# Patient Record
Sex: FEMALE | Race: OTHER | HISPANIC OR LATINO
[De-identification: names, ages, dates, MRNs, and addresses within clinical notes are randomized per-mention and may not be internally consistent; named-entity substitution may affect disease eponyms.]

---

## 2018-08-15 ENCOUNTER — APPOINTMENT (OUTPATIENT)
Dept: OBGYN | Facility: CLINIC | Age: 33
End: 2018-08-15

## 2018-10-05 ENCOUNTER — EMERGENCY (EMERGENCY)
Facility: HOSPITAL | Age: 33
LOS: 1 days | Discharge: HOME | End: 2018-10-05
Attending: EMERGENCY MEDICINE
Payer: COMMERCIAL

## 2018-10-05 VITALS — WEIGHT: 145.06 LBS

## 2018-10-05 PROCEDURE — 99284 EMERGENCY DEPT VISIT MOD MDM: CPT | Mod: 25

## 2018-10-05 PROCEDURE — 99053 MED SERV 10PM-8AM 24 HR FAC: CPT

## 2018-10-06 VITALS
TEMPERATURE: 98 F | DIASTOLIC BLOOD PRESSURE: 76 MMHG | SYSTOLIC BLOOD PRESSURE: 124 MMHG | HEART RATE: 77 BPM | RESPIRATION RATE: 17 BRPM | OXYGEN SATURATION: 100 %

## 2018-10-06 LAB
ALBUMIN SERPL ELPH-MCNC: 4.6 G/DL — SIGNIFICANT CHANGE UP (ref 3.3–5.2)
ALP SERPL-CCNC: 77 U/L — SIGNIFICANT CHANGE UP (ref 40–120)
ALT FLD-CCNC: 45 U/L — HIGH
ANION GAP SERPL CALC-SCNC: 12 MMOL/L — SIGNIFICANT CHANGE UP (ref 5–17)
APPEARANCE UR: CLEAR — SIGNIFICANT CHANGE UP
APTT BLD: 34 SEC — SIGNIFICANT CHANGE UP (ref 27.5–37.4)
AST SERPL-CCNC: 26 U/L — SIGNIFICANT CHANGE UP
BASOPHILS # BLD AUTO: 0 K/UL — SIGNIFICANT CHANGE UP (ref 0–0.2)
BASOPHILS NFR BLD AUTO: 0.4 % — SIGNIFICANT CHANGE UP (ref 0–2)
BILIRUB SERPL-MCNC: <0.2 MG/DL — LOW (ref 0.4–2)
BILIRUB UR-MCNC: NEGATIVE — SIGNIFICANT CHANGE UP
BLD GP AB SCN SERPL QL: SIGNIFICANT CHANGE UP
BUN SERPL-MCNC: 15 MG/DL — SIGNIFICANT CHANGE UP (ref 8–20)
CALCIUM SERPL-MCNC: 9.4 MG/DL — SIGNIFICANT CHANGE UP (ref 8.6–10.2)
CHLORIDE SERPL-SCNC: 102 MMOL/L — SIGNIFICANT CHANGE UP (ref 98–107)
CO2 SERPL-SCNC: 25 MMOL/L — SIGNIFICANT CHANGE UP (ref 22–29)
COLOR SPEC: YELLOW — SIGNIFICANT CHANGE UP
CREAT SERPL-MCNC: 0.6 MG/DL — SIGNIFICANT CHANGE UP (ref 0.5–1.3)
DIFF PNL FLD: ABNORMAL
EOSINOPHIL # BLD AUTO: 0.2 K/UL — SIGNIFICANT CHANGE UP (ref 0–0.5)
EOSINOPHIL NFR BLD AUTO: 2.1 % — SIGNIFICANT CHANGE UP (ref 0–6)
EPI CELLS # UR: SIGNIFICANT CHANGE UP
GLUCOSE SERPL-MCNC: 112 MG/DL — SIGNIFICANT CHANGE UP (ref 70–115)
GLUCOSE UR QL: NEGATIVE MG/DL — SIGNIFICANT CHANGE UP
HCT VFR BLD CALC: 38 % — SIGNIFICANT CHANGE UP (ref 37–47)
HGB BLD-MCNC: 11.9 G/DL — LOW (ref 12–16)
INR BLD: 1.06 RATIO — SIGNIFICANT CHANGE UP (ref 0.88–1.16)
KETONES UR-MCNC: NEGATIVE — SIGNIFICANT CHANGE UP
LEUKOCYTE ESTERASE UR-ACNC: NEGATIVE — SIGNIFICANT CHANGE UP
LYMPHOCYTES # BLD AUTO: 2.3 K/UL — SIGNIFICANT CHANGE UP (ref 1–4.8)
LYMPHOCYTES # BLD AUTO: 24.4 % — SIGNIFICANT CHANGE UP (ref 20–55)
MCHC RBC-ENTMCNC: 28.7 PG — SIGNIFICANT CHANGE UP (ref 27–31)
MCHC RBC-ENTMCNC: 31.3 G/DL — LOW (ref 32–36)
MCV RBC AUTO: 91.8 FL — SIGNIFICANT CHANGE UP (ref 81–99)
MONOCYTES # BLD AUTO: 0.5 K/UL — SIGNIFICANT CHANGE UP (ref 0–0.8)
MONOCYTES NFR BLD AUTO: 5.1 % — SIGNIFICANT CHANGE UP (ref 3–10)
NEUTROPHILS # BLD AUTO: 6.3 K/UL — SIGNIFICANT CHANGE UP (ref 1.8–8)
NEUTROPHILS NFR BLD AUTO: 67.8 % — SIGNIFICANT CHANGE UP (ref 37–73)
NITRITE UR-MCNC: NEGATIVE — SIGNIFICANT CHANGE UP
PH UR: 7 — SIGNIFICANT CHANGE UP (ref 5–8)
PLATELET # BLD AUTO: 357 K/UL — SIGNIFICANT CHANGE UP (ref 150–400)
POTASSIUM SERPL-MCNC: 4.1 MMOL/L — SIGNIFICANT CHANGE UP (ref 3.5–5.3)
POTASSIUM SERPL-SCNC: 4.1 MMOL/L — SIGNIFICANT CHANGE UP (ref 3.5–5.3)
PROT SERPL-MCNC: 7.2 G/DL — SIGNIFICANT CHANGE UP (ref 6.6–8.7)
PROT UR-MCNC: NEGATIVE MG/DL — SIGNIFICANT CHANGE UP
PROTHROM AB SERPL-ACNC: 11.7 SEC — SIGNIFICANT CHANGE UP (ref 9.8–12.7)
RBC # BLD: 4.14 M/UL — LOW (ref 4.4–5.2)
RBC # FLD: 12.6 % — SIGNIFICANT CHANGE UP (ref 11–15.6)
RBC CASTS # UR COMP ASSIST: SIGNIFICANT CHANGE UP /HPF (ref 0–4)
SODIUM SERPL-SCNC: 139 MMOL/L — SIGNIFICANT CHANGE UP (ref 135–145)
SP GR SPEC: 1.01 — SIGNIFICANT CHANGE UP (ref 1.01–1.02)
TYPE + AB SCN PNL BLD: SIGNIFICANT CHANGE UP
UROBILINOGEN FLD QL: NEGATIVE MG/DL — SIGNIFICANT CHANGE UP
WBC # BLD: 9.3 K/UL — SIGNIFICANT CHANGE UP (ref 4.8–10.8)
WBC # FLD AUTO: 9.3 K/UL — SIGNIFICANT CHANGE UP (ref 4.8–10.8)
WBC UR QL: NEGATIVE — SIGNIFICANT CHANGE UP

## 2018-10-06 PROCEDURE — 86850 RBC ANTIBODY SCREEN: CPT

## 2018-10-06 PROCEDURE — 85730 THROMBOPLASTIN TIME PARTIAL: CPT

## 2018-10-06 PROCEDURE — 86900 BLOOD TYPING SEROLOGIC ABO: CPT

## 2018-10-06 PROCEDURE — 76830 TRANSVAGINAL US NON-OB: CPT

## 2018-10-06 PROCEDURE — 80053 COMPREHEN METABOLIC PANEL: CPT

## 2018-10-06 PROCEDURE — 85027 COMPLETE CBC AUTOMATED: CPT

## 2018-10-06 PROCEDURE — 76856 US EXAM PELVIC COMPLETE: CPT | Mod: 26

## 2018-10-06 PROCEDURE — 99284 EMERGENCY DEPT VISIT MOD MDM: CPT

## 2018-10-06 PROCEDURE — 36415 COLL VENOUS BLD VENIPUNCTURE: CPT

## 2018-10-06 PROCEDURE — 84702 CHORIONIC GONADOTROPIN TEST: CPT

## 2018-10-06 PROCEDURE — 81001 URINALYSIS AUTO W/SCOPE: CPT

## 2018-10-06 PROCEDURE — 76830 TRANSVAGINAL US NON-OB: CPT | Mod: 26

## 2018-10-06 PROCEDURE — 85610 PROTHROMBIN TIME: CPT

## 2018-10-06 PROCEDURE — 76856 US EXAM PELVIC COMPLETE: CPT

## 2018-10-06 PROCEDURE — 86901 BLOOD TYPING SEROLOGIC RH(D): CPT

## 2018-10-06 RX ORDER — KETOROLAC TROMETHAMINE 30 MG/ML
30 SYRINGE (ML) INJECTION ONCE
Qty: 0 | Refills: 0 | Status: COMPLETED | OUTPATIENT
Start: 2018-10-06 | End: 2018-10-06

## 2018-10-06 RX ORDER — SODIUM CHLORIDE 9 MG/ML
1000 INJECTION INTRAMUSCULAR; INTRAVENOUS; SUBCUTANEOUS ONCE
Qty: 0 | Refills: 0 | Status: DISCONTINUED | OUTPATIENT
Start: 2018-10-06 | End: 2018-10-10

## 2018-10-06 NOTE — ED PROVIDER NOTE - NS ED ROS FT
Const: Denies fever, chills  HEENT: Denies blurry vision, sore throat  Neck: Denies neck pain/stiffness  Resp: Denies coughing, SOB  Cardiovascular: Denies CP, palpitations, LE edema  GI: Denies vomiting, diarrhea, constipation, blood in stool. +NAUSEA, +ABDOMINAL PAIN, +RIGHT FLANK PAIN (resolved)  : Denies urinary frequency/urgency/dysuria, hematuria  MSK: Denies back pain  Neuro: Denies HA, dizziness, numbness, weakness  Skin: Denies rashes.

## 2018-10-06 NOTE — ED PROVIDER NOTE - PROGRESS NOTE DETAILS
Tatyana: sign out received. pt states she feels much better. She is tolerating PO and abd is soft and non-tender on re-examination. US with ruptured cyst, which is clinically consistent with her presentation. Pt has a gyn to f/u with, no discharge, stable for dc

## 2018-10-06 NOTE — ED PROVIDER NOTE - PHYSICAL EXAMINATION
Const: Awake, alert and oriented. In no acute distress. Well appearing.  HEENT: NC/AT. Moist mucous membranes.  Eyes: No scleral icterus. EOMI.  Neck:. Soft and supple. Full ROM without pain.  Cardiac: Regular rate and regular rhythm. +S1/S2. No murmurs. Peripheral pulses 2+ and symmetric. No LE edema.  Resp: Speaking in full sentences. No evidence of respiratory distress. No wheezes, rales or rhonchi.  Abd: Soft, diffuse suprapubic, LLQ, and RLQ tenderness on palpation, with guarding, non-distended. Normal bowel sounds in all 4 quadrants. No guarding or rebound.  Back: Spine midline and non-tender. No CVAT.  Skin: No rashes, abrasions or lacerations.  Neuro: Awake, alert & oriented x 3. Moves all extremities symmetrically.

## 2018-10-06 NOTE — ED ADULT NURSE REASSESSMENT NOTE - NS ED NURSE REASSESS COMMENT FT1
Patient rec;'d from previous shift  at bedside ; all diagnostics resulted, pt c/o of mild pressure to suprapubic area but as per patient "tolerable"

## 2018-10-06 NOTE — ED PROVIDER NOTE - MEDICAL DECISION MAKING DETAILS
Pt with sudden onset lower abd pain associated with nausea, will r/o ovarian torsion vs kidney stone.  Will consider CT abd/pelvis with IV and PO contrast if US negative to r/o appendicitis. Will repeat abd exam, give IV hydration and Toradol for pain control and reeval

## 2018-10-06 NOTE — ED PROVIDER NOTE - OBJECTIVE STATEMENT
33 y/o F, with hx of hematuria, presents to the ED c/o sudden onset lower abdominal pain, onset 20 minutes PTA.  Pt states pain is sharp, debilitating, and began while at rest.  Associated sx include nausea.  Pain radiates down bilateral lower extremities and to her lower back.  Pt states that she feels like she needs to have a bowel movement but is unable to.  Also notes that she required assistance to stand up off the toilet secondary to pain.  Pt states pain is now improving and is now a dull ache to her lower abdomen, but still radiates down her lower extremities and to her lower back.  Also notes that yesterday she had right flank soreness, which has since resolved.  Pt states that she is currently recovering from a URI.  Notes that last week she had a runny nose, fever, and sore throat last week, but is afebrile this week.  Pt with of hematuria of unknown cause, but does not notice blood in her urine today or the past few days.  States that she stopped her oral contraceptives in August and had 2 menstrual cycles in September.  Denies self medicating for pain.  Denies allergies to medications.  Denies similar sx in the past.  Denies fever, chills, chest pain, SOB, cough, vomiting, or HA.

## 2018-11-09 ENCOUNTER — APPOINTMENT (OUTPATIENT)
Dept: OBGYN | Facility: CLINIC | Age: 33
End: 2018-11-09

## 2019-01-20 ENCOUNTER — TRANSCRIPTION ENCOUNTER (OUTPATIENT)
Age: 34
End: 2019-01-20

## 2019-01-23 ENCOUNTER — APPOINTMENT (OUTPATIENT)
Dept: ORTHOPEDIC SURGERY | Facility: CLINIC | Age: 34
End: 2019-01-23
Payer: COMMERCIAL

## 2019-01-23 ENCOUNTER — FORM ENCOUNTER (OUTPATIENT)
Age: 34
End: 2019-01-23

## 2019-01-23 ENCOUNTER — TRANSCRIPTION ENCOUNTER (OUTPATIENT)
Age: 34
End: 2019-01-23

## 2019-01-23 PROCEDURE — 99204 OFFICE O/P NEW MOD 45 MIN: CPT

## 2019-01-23 PROCEDURE — 73610 X-RAY EXAM OF ANKLE: CPT | Mod: LT

## 2019-01-23 NOTE — HISTORY OF PRESENT ILLNESS
[FreeTextEntry1] : 33 year year old female presenting with L ankle pain. The patient’s pain is noted to be a 7/10. She states that her pain has been occuring since 1/9//19. The patient's pain is described as constant and localized in nature. The patient denies any falls, trauma, or injuries. \par She is currently taking no pain medication. She presents today wearing regular shoes. No other complaints at this time. \par

## 2019-01-23 NOTE — DISCUSSION/SUMMARY
[de-identified] : Today I had a lengthy discussion with the patient regarding their L ankle pain.I have addressed all the patient's concerns surrounding the pathology of their condition. At this time, I would like to obtain advanced imaging of the patient's L ankle. An MRI was ordered of the L ankle in order to better understand the etiology of the patient’s condition. She should follow up with the office after completing the MRI. In the interim, the patient can utilize ice, NSAIDs PRN, heat, and elevate their L ankle above the level of their heart. The patient understood and verbally agreed to the treatment plan. All of their questions were answered and they were satisfied with the visit. The patient should call the office if they have any questions or experience worsening symptoms. \par

## 2019-01-23 NOTE — ADDENDUM
[FreeTextEntry1] : I, Walter Duncan, acted solely as a scribe for Dr. Jeff James on this date 01/23/2019  .\par  \par All medical record entries made by the Scribe were at my, Dr. Jeff James, direction and personally dictated by me on 01/23/2019 . I have reviewed the chart and agree that the record accurately reflects my personal performance of the history, physical exam, assessment and plan. I have also personally directed, reviewed, and agreed with the chart.\par \par \par

## 2019-01-23 NOTE — PHYSICAL EXAM
[de-identified] : General: Alert and oriented x3. In no acute distress. Pleasant in nature with a normal affect. No apparent respiratory distress.\par \par L Ankle Exam \par \par Skin: Clean, dry, intact\par Inspection: No obvious malalignment, no masses, no swelling, no effusion\par Pulses: 2+ DP/PT pulses\par ROM: FOOT Full ROM of digits, ANKLE 10 degrees of dorsiflexion, 40 degrees of plantarflexion, 10 degrees of subtalar motion.\par Painful ROM: None\par Tenderness: +pain to peroneal with circumductio. +Distal peroneal tendon adjacent to cuboid pain. No tenderness over the medial malleolus, no tenderness over the lateral malleolus, no CFL/ATFL/PTFL pain. No deltoid ligament pain. No heel pain. No Achilles tenderness. No 5th metatarsal pain. No pain to the LisFranc joint. No ttp over the posterior tibial tendon.\par Stability: Negative anterior/posterior drawer.\par Strength: 5/5 ADD/ABD/TA/GS/EHL/FHL/EDL\par Neuro: Sensation in tact to light touch throughout\par Additional tests: Negative Mortons test, negative tarsal tunnel tinels, negative single heel rise\par \par \par   [de-identified] : 3V of the L ankle were ordered obtained and reviewed by me today, 01/23/2019 , revealed: no fx\par \par \par \par

## 2019-01-23 NOTE — CONSULT LETTER
[Consult Letter:] : I had the pleasure of evaluating your patient, [unfilled]. [Please see my note below.] : Please see my note below. [Consult Closing:] : Thank you very much for allowing me to participate in the care of this patient.  If you have any questions, please do not hesitate to contact me. [Sincerely,] : Sincerely, [FreeTextEntry3] : Jeff James

## 2019-01-23 NOTE — DISCUSSION/SUMMARY
[de-identified] : Today I had a lengthy discussion with the patient regarding their L ankle pain.I have addressed all the patient's concerns surrounding the pathology of their condition. At this time, I would like to obtain advanced imaging of the patient's L ankle. An MRI was ordered of the L ankle in order to better understand the etiology of the patient’s condition. She should follow up with the office after completing the MRI. In the interim, the patient can utilize ice, NSAIDs PRN, heat, and elevate their L ankle above the level of their heart. The patient understood and verbally agreed to the treatment plan. All of their questions were answered and they were satisfied with the visit. The patient should call the office if they have any questions or experience worsening symptoms. \par

## 2019-01-23 NOTE — PHYSICAL EXAM
[de-identified] : General: Alert and oriented x3. In no acute distress. Pleasant in nature with a normal affect. No apparent respiratory distress.\par \par L Ankle Exam \par \par Skin: Clean, dry, intact\par Inspection: No obvious malalignment, no masses, no swelling, no effusion\par Pulses: 2+ DP/PT pulses\par ROM: FOOT Full ROM of digits, ANKLE 10 degrees of dorsiflexion, 40 degrees of plantarflexion, 10 degrees of subtalar motion.\par Painful ROM: None\par Tenderness: +pain to peroneal with circumductio. +Distal peroneal tendon adjacent to cuboid pain. No tenderness over the medial malleolus, no tenderness over the lateral malleolus, no CFL/ATFL/PTFL pain. No deltoid ligament pain. No heel pain. No Achilles tenderness. No 5th metatarsal pain. No pain to the LisFranc joint. No ttp over the posterior tibial tendon.\par Stability: Negative anterior/posterior drawer.\par Strength: 5/5 ADD/ABD/TA/GS/EHL/FHL/EDL\par Neuro: Sensation in tact to light touch throughout\par Additional tests: Negative Mortons test, negative tarsal tunnel tinels, negative single heel rise\par \par \par   [de-identified] : 3V of the L ankle were ordered obtained and reviewed by me today, 01/23/2019 , revealed: no fx\par \par \par \par

## 2019-01-24 ENCOUNTER — OUTPATIENT (OUTPATIENT)
Dept: OUTPATIENT SERVICES | Facility: HOSPITAL | Age: 34
LOS: 1 days | End: 2019-01-24
Payer: COMMERCIAL

## 2019-01-24 ENCOUNTER — APPOINTMENT (OUTPATIENT)
Dept: MRI IMAGING | Facility: CLINIC | Age: 34
End: 2019-01-24
Payer: COMMERCIAL

## 2019-01-24 DIAGNOSIS — M25.572 PAIN IN LEFT ANKLE AND JOINTS OF LEFT FOOT: ICD-10-CM

## 2019-01-24 DIAGNOSIS — M76.72 PERONEAL TENDINITIS, LEFT LEG: ICD-10-CM

## 2019-01-24 PROCEDURE — 73721 MRI JNT OF LWR EXTRE W/O DYE: CPT | Mod: 26,LT

## 2019-01-24 PROCEDURE — 73721 MRI JNT OF LWR EXTRE W/O DYE: CPT

## 2019-01-25 ENCOUNTER — APPOINTMENT (OUTPATIENT)
Dept: ORTHOPEDIC SURGERY | Facility: CLINIC | Age: 34
End: 2019-01-25
Payer: COMMERCIAL

## 2019-01-25 PROCEDURE — 99214 OFFICE O/P EST MOD 30 MIN: CPT

## 2019-01-25 NOTE — DISCUSSION/SUMMARY
[de-identified] : Today I had a lengthy discussion with the patient regarding their L ankle pain.I have addressed all the patient's concerns surrounding the pathology of their condition. At this time, I recommend conservative treatment for the patient's left ankle pain. I recommend the patient undergo a course of physical therapy for the L ankle  2-3 times a week for a total of 6-8 weeks. A prescription was given for the physical therapy today. I also recommend that the patient utilize Voltaren gel. A prescription for the Voltaren gel was ordered for the patient to be used as instructed. If the Voltaren gel cannot be obtained, icy hot, Biofreeze, or Bengay can be utilized instead. In addition, I suggest that the patient utilize Meloxicam as instructed. A prescription for the Meloxicam was ordered for the patient in the office today. I would like to see the pt back in the office in 6-8 weeks to reassess their condition.\par The patient understood and verbally agreed to the treatment plan. All of their questions were answered and they were satisfied with the visit. The patient should call the office if they have any questions or experience worsening symptoms. \par

## 2019-01-25 NOTE — HISTORY OF PRESENT ILLNESS
[FreeTextEntry1] : 33 year year old female presenting with L ankle pain. The patient’s pain is noted to be a 6/10. The patient describes their pain as the same compared to their last visit. She states that she has continued pain in the left ankle. She is currently taking NSAIDs. The patient presents today wearing regular shoes. No other complaints at this time. \par

## 2019-01-25 NOTE — PHYSICAL EXAM
[de-identified] : General: Alert and oriented x3. In no acute distress. Pleasant in nature with a normal affect. No apparent respiratory distress.\par \par L Ankle Exam \par \par Skin: Clean, dry, intact\par Inspection: No obvious malalignment, no masses, no swelling, no effusion\par Pulses: 2+ DP/PT pulses\par ROM: FOOT Full ROM of digits, ANKLE 10 degrees of dorsiflexion, 40 degrees of plantarflexion, 10 degrees of subtalar motion.\par Painful ROM: None\par Tenderness: +Pain to peroneal with circumduction. +Distal peroneal tendon adjacent to the cuboid pain. No tenderness over the medial malleolus, no tenderness over the lateral malleolus, no CFL/ATFL/PTFL pain. No deltoid ligament pain. No heel pain. No Achilles tenderness. No 5th metatarsal pain. No pain to the LisFranc joint. No ttp over the posterior tibial tendon.\par Stability: Negative anterior/posterior drawer.\par Strength: 5/5 ADD/ABD/TA/GS/EHL/FHL/EDL\par Neuro: Sensation in tact to light touch throughout\par Additional tests: Negative Mortons test, negative tarsal tunnel tinels, negative single heel rise\par \par \par   [de-identified] : MRI from [facility] ankle on 1/25/19 obtained and reviewed by me today, 01/25/2019 , revealed:\par \par 1.  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 There is \par moderate to severe intrasubstance, moderate tendinosis, and peritendinous \par edema centered about an os peroneum within the peroneus longus tendon at the \par level of the cuboid. Proximal to the region of intratendinous edema of the \par peroneus longus tendon appears protuberant suggestive of some degree of \par intrasubstance tearing distal to this region. No full-thickness tear is \par demonstrated. There is mild intrasubstance edema within the os peroneum. \par There is mild peroneal tenosynovitis.

## 2019-01-25 NOTE — ADDENDUM
[FreeTextEntry1] : I, Walter Duncan, acted solely as a scribe for Dr. Jeff James on this date 01/25/2019  .\par  \par All medical record entries made by the Scribe were at my, Dr. Jeff James, direction and personally dictated by me on 01/25/2019 . I have reviewed the chart and agree that the record accurately reflects my personal performance of the history, physical exam, assessment and plan. I have also personally directed, reviewed, and agreed with the chart.\par \par \par

## 2019-01-26 ENCOUNTER — APPOINTMENT (OUTPATIENT)
Dept: OBGYN | Facility: CLINIC | Age: 34
End: 2019-01-26
Payer: COMMERCIAL

## 2019-01-26 VITALS
SYSTOLIC BLOOD PRESSURE: 120 MMHG | HEIGHT: 61 IN | WEIGHT: 150 LBS | DIASTOLIC BLOOD PRESSURE: 75 MMHG | BODY MASS INDEX: 28.32 KG/M2

## 2019-01-26 DIAGNOSIS — Z80.41 FAMILY HISTORY OF MALIGNANT NEOPLASM OF OVARY: ICD-10-CM

## 2019-01-26 DIAGNOSIS — Z80.49 FAMILY HISTORY OF MALIGNANT NEOPLASM OF OTHER GENITAL ORGANS: ICD-10-CM

## 2019-01-26 PROCEDURE — 99385 PREV VISIT NEW AGE 18-39: CPT

## 2019-01-26 RX ORDER — NORGESTIMATE AND ETHINYL ESTRADIOL 7DAYSX3 LO
0.18/0.215/0.25 KIT ORAL
Qty: 28 | Refills: 0 | Status: COMPLETED | COMMUNITY
Start: 2018-02-21 | End: 2019-01-26

## 2019-01-26 NOTE — PHYSICAL EXAM
[Awake] : awake [Alert] : alert [Soft] : soft [Oriented x3] : oriented to person, place, and time [Labia Majora] : labia major [Labia Minora] : labia minora [Normal] : clitoris [No Bleeding] : there was no active vaginal bleeding [Pap Obtained] : a Pap smear was performed [Uterine Adnexae] : were not tender and not enlarged [Acute Distress] : no acute distress [LAD] : no lymphadenopathy [Thyroid Nodule] : no thyroid nodule [Goiter] : no goiter [Mass] : no breast mass [Nipple Discharge] : no nipple discharge [Axillary LAD] : no axillary lymphadenopathy [Tender] : non tender [Distended] : not distended [H/Smegaly] : no hepatosplenomegaly [Depressed Mood] : not depressed [Flat Affect] : affect not flat

## 2019-01-26 NOTE — HISTORY OF PRESENT ILLNESS
[___ Year(s) Ago] : [unfilled] year(s) ago [Good] : being in good health [Healthy Diet] : a healthy diet [Weight Concerns] : weight concens [Last Pap ___] : Last cervical pap smear was [unfilled] [Reproductive Age] : is of reproductive age [No] : no [Regular Exercise] : not exercising regularly [Contraception] : does not use contraception

## 2019-01-28 LAB — HPV HIGH+LOW RISK DNA PNL CVX: NOT DETECTED

## 2019-01-29 ENCOUNTER — TRANSCRIPTION ENCOUNTER (OUTPATIENT)
Age: 34
End: 2019-01-29

## 2019-01-30 ENCOUNTER — APPOINTMENT (OUTPATIENT)
Dept: ORTHOPEDIC SURGERY | Facility: CLINIC | Age: 34
End: 2019-01-30
Payer: COMMERCIAL

## 2019-01-30 PROCEDURE — 99213 OFFICE O/P EST LOW 20 MIN: CPT

## 2019-01-30 NOTE — HISTORY OF PRESENT ILLNESS
[FreeTextEntry1] : Gloria is returned patient presents with continued left foot pain/ankle. She was diagnosed with Os Peroneum Syndrome confirmed by MRI. She is here today to receive a short CAM boot to offload the foot and ankle and to give her some relief because she is living with 5/10 pain as we speak. She was given a physical therapy prescription 5 days ago when she was in office. She was also given anti-inflammatory cream and she tried meloxicam but she broke out in a rash and cannot take it although she did state that it did help her. She has no other complaints at this time.

## 2019-01-30 NOTE — PHYSICAL EXAM
[de-identified] : The physical exam of the left ankle and foot did not change since the previous visit 5 days ago. She continues to have lateral ankle pain over the peroneal tendons. [de-identified] : No new imaging performed or reviewed today and office.

## 2019-01-30 NOTE — DISCUSSION/SUMMARY
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 \par \par Plan:\par I placed her into a short cam boot today. She will continue her physical therapy. I prescribed her Celebrex because she cannot take meloxicam. She will also continue to use the anti-inflammatory gels she has at home. I want her to follow up in 4 weeks to make sure she is going into the right direction. At that office visit we will discuss transitioning from a short CAM boot to an ankle ASO if she is feeling better. She is on board with this treatment plan and all her questions were answered.

## 2019-01-31 LAB — CYTOLOGY CVX/VAG DOC THIN PREP: NORMAL

## 2019-02-27 ENCOUNTER — APPOINTMENT (OUTPATIENT)
Dept: ORTHOPEDIC SURGERY | Facility: CLINIC | Age: 34
End: 2019-02-27
Payer: COMMERCIAL

## 2019-02-27 PROCEDURE — 99213 OFFICE O/P EST LOW 20 MIN: CPT

## 2019-02-27 NOTE — ADDENDUM
[FreeTextEntry1] : Documented by Sumaya Gutierrez acting as a scribe for Dr. James on 02/27/2019 \par \par All medical record entries made by the Scribe were at my, Dr. Vital, direction and\par personally dictated by me on 02/27/2019 . I have reviewed the chart and agree that the record\par accurately reflects my personal performance of the history, physical exam, procedure and imaging.

## 2019-02-27 NOTE — HISTORY OF PRESENT ILLNESS
[FreeTextEntry1] : Pt is a 33 year old  F present in the office today in regards to her L ankle pain. Pt notes being in the CAM boot helped her tremendously. She also notes she has been using V-gel.  Her current pain level is a 2/10. Pt notes Her  pain has improved 90% and the swelling has improved 100% as compared to their last visit. She is not currently taking any pain medications at this moment. Pt is accompanied by her . No other complaints at this time.\par

## 2019-02-27 NOTE — PHYSICAL EXAM
[de-identified] : General: Alert and oriented x3. In no acute distress. Pleasant in nature with a normal affect. No apparent respiratory distress.\par \par L Ankle Exam\par Skin: Clean, dry and intact.\par Inspection: No obvious malalignment, no swelling, no effusion;no lymphadenopathy\par Pulses: 2+ DP/PT pulses\par ROM: Right: 10  degrees dorsiflexion, 40   degrees of plantarflexion,  10  degrees of subtalar motion. Left: 10 degrees dorsiflexion, 40   degrees of plantarflexion,  10  degrees of subtalar motion.\par Tenderness: No tenderness over the lateral malleolus, no CFL/ATFL/PTFL pain. No medial malleolus pain, no deltoid ligament pain. No proximal fibular pain. No heel pain.\par Stability: Negative anterior/posterior drawer. \par Strength: 5/5 TA/GS/EHL\par Neuro: Intact to light touch throughout\par Additional tests: Negative Mazariegos's test, negative syndesmosis squeeze test.\par . [de-identified] : None new obtained today.\par

## 2019-02-27 NOTE — DISCUSSION/SUMMARY
[de-identified] : Today in the office I had a lengthy discussion with the patient regarding her L ankle pain. I have addressed all of the patient's concern surrounding the pathology of their conditions. I have offered her physical therapy but she has deferred. She will do home exercises on her own and work on her ROM for the interim. The pt is to call me as soon as possible if they notice any worsening pain or symptoms. I would like to follow up with the patient within the next 2-3 months prn. All questions were answered and the patient verbalized understanding. The patient is in agreement with this treatment plan.\par

## 2019-03-01 ENCOUNTER — TRANSCRIPTION ENCOUNTER (OUTPATIENT)
Age: 34
End: 2019-03-01

## 2019-03-07 ENCOUNTER — APPOINTMENT (OUTPATIENT)
Dept: ANTEPARTUM | Facility: CLINIC | Age: 34
End: 2019-03-07

## 2019-03-07 ENCOUNTER — APPOINTMENT (OUTPATIENT)
Dept: OBGYN | Facility: CLINIC | Age: 34
End: 2019-03-07

## 2019-03-08 ENCOUNTER — APPOINTMENT (OUTPATIENT)
Dept: OBGYN | Facility: CLINIC | Age: 34
End: 2019-03-08
Payer: COMMERCIAL

## 2019-03-08 PROCEDURE — 99212 OFFICE O/P EST SF 10 MIN: CPT

## 2019-03-09 ENCOUNTER — RESULT REVIEW (OUTPATIENT)
Age: 34
End: 2019-03-09

## 2019-03-09 LAB
CANDIDA VAG CYTO: NOT DETECTED
G VAGINALIS+PREV SP MTYP VAG QL MICRO: DETECTED
T VAGINALIS VAG QL WET PREP: NOT DETECTED

## 2019-03-11 ENCOUNTER — APPOINTMENT (OUTPATIENT)
Dept: ORTHOPEDIC SURGERY | Facility: CLINIC | Age: 34
End: 2019-03-11

## 2019-04-01 ENCOUNTER — APPOINTMENT (OUTPATIENT)
Dept: OBGYN | Facility: CLINIC | Age: 34
End: 2019-04-01
Payer: COMMERCIAL

## 2019-04-01 ENCOUNTER — NON-APPOINTMENT (OUTPATIENT)
Age: 34
End: 2019-04-01

## 2019-04-01 ENCOUNTER — ASOB RESULT (OUTPATIENT)
Age: 34
End: 2019-04-01

## 2019-04-01 VITALS
HEIGHT: 61 IN | WEIGHT: 145 LBS | BODY MASS INDEX: 27.38 KG/M2 | SYSTOLIC BLOOD PRESSURE: 118 MMHG | DIASTOLIC BLOOD PRESSURE: 70 MMHG

## 2019-04-01 PROCEDURE — 0501F PRENATAL FLOW SHEET: CPT

## 2019-04-01 PROCEDURE — 36415 COLL VENOUS BLD VENIPUNCTURE: CPT

## 2019-04-01 PROCEDURE — 76817 TRANSVAGINAL US OBSTETRIC: CPT

## 2019-04-01 PROCEDURE — 0500F INITIAL PRENATAL CARE VISIT: CPT

## 2019-04-01 RX ORDER — DICLOFENAC SODIUM 10 MG/G
1 GEL TOPICAL
Qty: 1 | Refills: 2 | Status: DISCONTINUED | COMMUNITY
Start: 2019-01-25 | End: 2019-04-01

## 2019-04-01 RX ORDER — METRONIDAZOLE 7.5 MG/G
0.75 GEL VAGINAL
Qty: 1 | Refills: 0 | Status: DISCONTINUED | COMMUNITY
Start: 2019-03-09 | End: 2019-04-01

## 2019-04-01 RX ORDER — CELECOXIB 100 MG/1
100 CAPSULE ORAL TWICE DAILY
Qty: 30 | Refills: 0 | Status: DISCONTINUED | COMMUNITY
Start: 2019-01-30 | End: 2019-04-01

## 2019-04-01 RX ORDER — MELOXICAM 7.5 MG/1
7.5 TABLET ORAL DAILY
Qty: 30 | Refills: 1 | Status: DISCONTINUED | COMMUNITY
Start: 2019-01-25 | End: 2019-04-01

## 2019-04-04 ENCOUNTER — RX CHANGE (OUTPATIENT)
Age: 34
End: 2019-04-04

## 2019-04-05 ENCOUNTER — RESULT REVIEW (OUTPATIENT)
Age: 34
End: 2019-04-05

## 2019-04-05 ENCOUNTER — NON-APPOINTMENT (OUTPATIENT)
Age: 34
End: 2019-04-05

## 2019-04-05 LAB — ABO + RH PNL BLD: NORMAL

## 2019-04-11 LAB
AR GENE MUT ANL BLD/T: NEGATIVE
B19V IGG SER QL IA: 5 INDEX
B19V IGG+IGM SER-IMP: NORMAL
B19V IGG+IGM SER-IMP: POSITIVE
B19V IGM FLD-ACNC: 0.2
B19V IGM SER-ACNC: NEGATIVE
BASOPHILS # BLD AUTO: 0.05 K/UL
BASOPHILS NFR BLD AUTO: 0.4 %
BLD GP AB SCN SERPL QL: NORMAL
C TRACH RRNA SPEC QL NAA+PROBE: NOT DETECTED
CFTR MUT TESTED BLD/T: NEGATIVE
CMV IGG SERPL QL: <0.2 U/ML
CMV IGG SERPL-IMP: NEGATIVE
CMV IGM SERPL QL: <8 AU/ML
CMV IGM SERPL QL: NEGATIVE
EOSINOPHIL # BLD AUTO: 0.19 K/UL
EOSINOPHIL NFR BLD AUTO: 1.5 %
ESTIMATED AVERAGE GLUCOSE: 117 MG/DL
FMR1 GENE MUT ANL BLD/T: NORMAL
HBA1C MFR BLD HPLC: 5.7 %
HBV SURFACE AG SER QL: NONREACTIVE
HCT VFR BLD CALC: 35.1 %
HGB A MFR BLD: 97.4 %
HGB A2 MFR BLD: 2.6 %
HGB BLD-MCNC: 11.4 G/DL
HGB FRACT BLD-IMP: NORMAL
HIV1+2 AB SPEC QL IA.RAPID: NONREACTIVE
IMM GRANULOCYTES NFR BLD AUTO: 0.5 %
LYMPHOCYTES # BLD AUTO: 3.28 K/UL
LYMPHOCYTES NFR BLD AUTO: 25.6 %
MAN DIFF?: NORMAL
MCHC RBC-ENTMCNC: 30.3 PG
MCHC RBC-ENTMCNC: 32.5 GM/DL
MCV RBC AUTO: 93.4 FL
MONOCYTES # BLD AUTO: 0.61 K/UL
MONOCYTES NFR BLD AUTO: 4.8 %
N GONORRHOEA RRNA SPEC QL NAA+PROBE: NOT DETECTED
NEUTROPHILS # BLD AUTO: 8.62 K/UL
NEUTROPHILS NFR BLD AUTO: 67.2 %
PLATELET # BLD AUTO: 358 K/UL
RBC # BLD: 3.76 M/UL
RBC # FLD: 12.7 %
RUBV IGG FLD-ACNC: 3.3 INDEX
RUBV IGG SER-IMP: POSITIVE
SOURCE AMPLIFICATION: NORMAL
T GONDII AB SER-IMP: NEGATIVE
T GONDII IGM SER QL: <3 AU/ML
T PALLIDUM AB SER QL IA: NEGATIVE
TSH SERPL-ACNC: 1.12 UIU/ML
WBC # FLD AUTO: 12.81 K/UL

## 2019-04-22 LAB
GLUCOSE 2H P 100 G GLC PO SERPL-MCNC: 147 MG/DL
GLUCOSE BS SERPL-MCNC: 79 MG/DL

## 2019-04-23 ENCOUNTER — APPOINTMENT (OUTPATIENT)
Dept: OBGYN | Facility: CLINIC | Age: 34
End: 2019-04-23
Payer: COMMERCIAL

## 2019-04-23 ENCOUNTER — ASOB RESULT (OUTPATIENT)
Age: 34
End: 2019-04-23

## 2019-04-23 ENCOUNTER — NON-APPOINTMENT (OUTPATIENT)
Age: 34
End: 2019-04-23

## 2019-04-23 VITALS
WEIGHT: 147 LBS | BODY MASS INDEX: 27.75 KG/M2 | SYSTOLIC BLOOD PRESSURE: 110 MMHG | HEIGHT: 61 IN | DIASTOLIC BLOOD PRESSURE: 70 MMHG

## 2019-04-23 LAB
BILIRUB UR QL STRIP: NORMAL
CLARITY UR: CLEAR
COLLECTION METHOD: NORMAL
GLUCOSE UR-MCNC: NORMAL
HCG UR QL: 0.2 EU/DL
HGB UR QL STRIP.AUTO: NORMAL
KETONES UR-MCNC: NORMAL
LEUKOCYTE ESTERASE UR QL STRIP: NORMAL
NITRITE UR QL STRIP: NORMAL
PH UR STRIP: 6
PROT UR STRIP-MCNC: NORMAL
SP GR UR STRIP: 1.02

## 2019-04-23 PROCEDURE — 0502F SUBSEQUENT PRENATAL CARE: CPT

## 2019-04-23 PROCEDURE — 36415 COLL VENOUS BLD VENIPUNCTURE: CPT

## 2019-04-23 PROCEDURE — 76813 OB US NUCHAL MEAS 1 GEST: CPT

## 2019-04-26 ENCOUNTER — NON-APPOINTMENT (OUTPATIENT)
Age: 34
End: 2019-04-26

## 2019-04-26 LAB
1ST TRIMESTER DATA: NORMAL
ADDENDUM DOC: NORMAL
AFP PNL SERPL: NORMAL
AFP SERPL-ACNC: NORMAL
CLINICAL BIOCHEMIST REVIEW: NORMAL
FREE BETA HCG 1ST TRIMESTER: NORMAL
Lab: NORMAL
NASAL BONE: PRESENT
NOTES NTD: NORMAL
NT: NORMAL
PAPP-A SERPL-ACNC: NORMAL
TRISOMY 18/3: NORMAL

## 2019-05-08 ENCOUNTER — APPOINTMENT (OUTPATIENT)
Dept: MATERNAL FETAL MEDICINE | Facility: CLINIC | Age: 34
End: 2019-05-08

## 2019-05-09 ENCOUNTER — APPOINTMENT (OUTPATIENT)
Dept: MATERNAL FETAL MEDICINE | Facility: CLINIC | Age: 34
End: 2019-05-09
Payer: COMMERCIAL

## 2019-05-09 ENCOUNTER — APPOINTMENT (OUTPATIENT)
Dept: ANTEPARTUM | Facility: CLINIC | Age: 34
End: 2019-05-09

## 2019-05-09 VITALS
HEART RATE: 74 BPM | BODY MASS INDEX: 28.02 KG/M2 | SYSTOLIC BLOOD PRESSURE: 118 MMHG | OXYGEN SATURATION: 98 % | DIASTOLIC BLOOD PRESSURE: 72 MMHG | RESPIRATION RATE: 18 BRPM | HEIGHT: 61 IN | WEIGHT: 148.44 LBS

## 2019-05-09 DIAGNOSIS — M25.572 PAIN IN LEFT ANKLE AND JOINTS OF LEFT FOOT: ICD-10-CM

## 2019-05-09 DIAGNOSIS — Z3A.09 9 WEEKS GESTATION OF PREGNANCY: ICD-10-CM

## 2019-05-09 DIAGNOSIS — Q68.8 OTHER SPECIFIED CONGENITAL MUSCULOSKELETAL DEFORMITIES: ICD-10-CM

## 2019-05-09 DIAGNOSIS — M76.72 PERONEAL TENDINITIS, LEFT LEG: ICD-10-CM

## 2019-05-09 DIAGNOSIS — Z3A.27 27 WEEKS GESTATION OF PREGNANCY: ICD-10-CM

## 2019-05-09 DIAGNOSIS — M54.5 LOW BACK PAIN: ICD-10-CM

## 2019-05-09 DIAGNOSIS — Z87.42 PERSONAL HISTORY OF OTHER DISEASES OF THE FEMALE GENITAL TRACT: ICD-10-CM

## 2019-05-09 DIAGNOSIS — N91.1 SECONDARY AMENORRHEA: ICD-10-CM

## 2019-05-09 PROCEDURE — 99242 OFF/OP CONSLTJ NEW/EST SF 20: CPT

## 2019-05-09 NOTE — SURGICAL HISTORY
[Fibroids] : fibroids [Abn Paps] : abnormal pap smear [STI's] : no STI's [Breast Disease] : no breast disease [Cysts] : no cysts [OC Use] : no OC use [Infertility] : no infertility [Last Mammo: ___] : Last Mammo: none [de-identified] : Left lateral fibroid.  Hx of abd pap in 2009 s/p colposcopy and LEEP in 2010.  Hx of HPV, most recent PAP wnl.

## 2019-05-09 NOTE — ACTIVE PROBLEMS
[Diabetes Mellitus] : no diabetes mellitus [Autoimmune Disease] : no autoimmune disease [Hypertension] : no hypertension [Heart Disease] : no heart disease [Depression] : no depression, no post partum depression [Renal Disease] : no kidney disease, no UTI [Neurologic Disorder] : no neurologic disorder, no epilepsy [Psychiatric Disorders] : no psychiatric disorders [Hepatic Disorder] : no hepatitis, no liver disease [Thrombophlebitis] : no varicosities, no phlebitis [Thyroid Disorder] : no thyroid dysfunction [Blood Transfusion (___ Ml)] : no history of blood transfusion [Trauma] : no trauma/violence

## 2019-05-09 NOTE — FAMILY HISTORY
[Age 35+ During Pregnancy] : not 35 or over during pregnancy [Reported Family History Of Birth Defects] : no congenital heart defects [Herb-Sachs Carrier] : no Herb-Sachs [Family History] : no mental retardation/autism [Reported Family History Of Genetic Disease] : no genetic/chromosomal disorder [FreeTextEntry1] : Pt states Husbands uncle has down syndrome

## 2019-05-09 NOTE — PAST MEDICAL HISTORY
[HIV Infection] : no HIV [Syphilis] : no syphilis [Exposure To Gonorrhea] : no gonorrhea [Chlamydial Infections] : no chlamydia [Hepatitis, B Virus] : no Hepatitis B [Herpes Simplex] : no genital herpes [Hepatitis, C Virus] : no Hepatitis C [Human Papilloma Virus Infection] : no genital warts [Trichomoniasis] : no trichomoniasis

## 2019-05-09 NOTE — DISCUSSION/SUMMARY
[FreeTextEntry1] : She is 14 weeks and 3 days gestation by her last menstrual period dates.\par \par I discussed the significance of a low FILI-A level with her and the father of the baby. I told them that FILI-A levels equal or less than the 5th percentile have been associated with a 2.7 fold increased risk for having a low birth weight infant. I also told them that there is a 2.4 fold increased risk for having a delivery before 34 weeks gestation, a 3.7 fold increased risk for having preeclampsia during pregnancy, a 3.3 fold increased risk for having a miscarriage or fetal loss before 24 weeks of gestation and a 1.9 fold increased risk for having a stillbirth or fetal loss at or after 24 weeks of gestation. I recommend a transvaginal ultrasound examination of the cervix at the time of the fetal anatomy ultrasound examination (between 18 - 20 weeks gestation) to screen for risk of  labor/delivery. I gave her  labor precautions. I advised her to take  Folic Acid 1 mg  daily and I sent an electronic prescription to her pharmacy. I also advised her to continue her daily prenatal vitamins. I advised her to take a baby aspirin daily to decreased the risk of developing preeclampsia,  delivery, and a small baby. I also recommend weekly testing of fetal well-being with BPPs or NSTs due to the increased risk for stillbirth starting at 32 - 34 weeks of gestation. I also recommend delivery at 39 weeks gestation if she has not given birth before 39 weeks to reduce the risk of late stillbirths. I recommend serial ultrasounds during the third trimester to monitor fetal growth. \par \par I told her that the loop electrosurgical excision procedure (LEEP) conization has been known to increase the risk of  birth particularly in women without prior  birth.  The rates of  births are highest after large or repeat LEEP conizations.  LEEP conization has also been associated with an increased risk for low birth weight infants and  premature rupture of membranes. I recommend obtaining cervical lengths at approximately 18, 20, 22 and 24 weeks of gestation. \par

## 2019-05-09 NOTE — OB HISTORY
[LMP: ___] : LMP: [unfilled] [JEWELL: ___] : JEWELL: [unfilled] [Spontaneous] : Spontaneous conception [EGA: ___ wks] : EGA: [unfilled] wks [Sonogram] : sonogram [Normal Amount/Duration] : was of a normal amount and duration [Definite:  ___ (Date)] : the last menstrual period was [unfilled] [Regular Cycle Intervals] : periods have been regular [at ___ wks] : at [unfilled] weeks [FreeTextEntry1] : First prenatal visit was April 1, 2019.\par \par A 2 hour GTT done on April 18, 2019 to screen for gestational diabetes was reported to be within normal limits.\par \par A first trimester screening test done on April 23, 2019 reported to be a low risk for Down syndrome and trisomy 18/13. The maternal FILI- A  level was noted to be low at the  2.5 percentile. \par \par \par  [Spotting Between  Menses] : no spotting between menses [Menstrual Cramps] : no menstrual cramps

## 2019-05-21 ENCOUNTER — APPOINTMENT (OUTPATIENT)
Dept: OBGYN | Facility: CLINIC | Age: 34
End: 2019-05-21
Payer: COMMERCIAL

## 2019-05-21 ENCOUNTER — NON-APPOINTMENT (OUTPATIENT)
Age: 34
End: 2019-05-21

## 2019-05-21 VITALS
HEIGHT: 61 IN | DIASTOLIC BLOOD PRESSURE: 66 MMHG | BODY MASS INDEX: 28.7 KG/M2 | WEIGHT: 152 LBS | SYSTOLIC BLOOD PRESSURE: 112 MMHG

## 2019-05-21 PROCEDURE — 36415 COLL VENOUS BLD VENIPUNCTURE: CPT

## 2019-05-21 PROCEDURE — 0502F SUBSEQUENT PRENATAL CARE: CPT

## 2019-06-03 LAB
BILIRUB UR QL STRIP: NORMAL
GLUCOSE UR-MCNC: NORMAL
HCG UR QL: 0.2 EU/DL
HGB UR QL STRIP.AUTO: ABNORMAL
KETONES UR-MCNC: NORMAL
LEUKOCYTE ESTERASE UR QL STRIP: NORMAL
NITRITE UR QL STRIP: NORMAL
PH UR STRIP: 6.5
PROT UR STRIP-MCNC: NORMAL
SP GR UR STRIP: 1.01

## 2019-06-18 ENCOUNTER — APPOINTMENT (OUTPATIENT)
Dept: ANTEPARTUM | Facility: CLINIC | Age: 34
End: 2019-06-18
Payer: COMMERCIAL

## 2019-06-18 ENCOUNTER — ASOB RESULT (OUTPATIENT)
Age: 34
End: 2019-06-18

## 2019-06-18 PROCEDURE — 76817 TRANSVAGINAL US OBSTETRIC: CPT

## 2019-06-18 PROCEDURE — 76811 OB US DETAILED SNGL FETUS: CPT

## 2019-06-24 ENCOUNTER — RX RENEWAL (OUTPATIENT)
Age: 34
End: 2019-06-24

## 2019-06-25 ENCOUNTER — NON-APPOINTMENT (OUTPATIENT)
Age: 34
End: 2019-06-25

## 2019-06-25 ENCOUNTER — APPOINTMENT (OUTPATIENT)
Dept: OBGYN | Facility: CLINIC | Age: 34
End: 2019-06-25
Payer: COMMERCIAL

## 2019-06-25 VITALS
WEIGHT: 158 LBS | HEIGHT: 61 IN | DIASTOLIC BLOOD PRESSURE: 58 MMHG | BODY MASS INDEX: 29.83 KG/M2 | SYSTOLIC BLOOD PRESSURE: 102 MMHG

## 2019-06-25 DIAGNOSIS — Z3A.14 14 WEEKS GESTATION OF PREGNANCY: ICD-10-CM

## 2019-06-25 LAB
BILIRUB UR QL STRIP: NORMAL
GLUCOSE UR-MCNC: NORMAL
HCG UR QL: 0.2 EU/DL
HGB UR QL STRIP.AUTO: ABNORMAL
KETONES UR-MCNC: ABNORMAL
LEUKOCYTE ESTERASE UR QL STRIP: NORMAL
NITRITE UR QL STRIP: NORMAL
PH UR STRIP: 6
PROT UR STRIP-MCNC: NORMAL
SP GR UR STRIP: 1.02

## 2019-06-25 PROCEDURE — 0502F SUBSEQUENT PRENATAL CARE: CPT

## 2019-07-02 ENCOUNTER — ASOB RESULT (OUTPATIENT)
Age: 34
End: 2019-07-02

## 2019-07-02 ENCOUNTER — APPOINTMENT (OUTPATIENT)
Dept: ANTEPARTUM | Facility: CLINIC | Age: 34
End: 2019-07-02
Payer: COMMERCIAL

## 2019-07-02 PROCEDURE — 76816 OB US FOLLOW-UP PER FETUS: CPT

## 2019-07-06 ENCOUNTER — NON-APPOINTMENT (OUTPATIENT)
Age: 34
End: 2019-07-06

## 2019-07-09 ENCOUNTER — NON-APPOINTMENT (OUTPATIENT)
Age: 34
End: 2019-07-09

## 2019-07-16 ENCOUNTER — APPOINTMENT (OUTPATIENT)
Dept: OBGYN | Facility: CLINIC | Age: 34
End: 2019-07-16

## 2019-07-16 VITALS
BODY MASS INDEX: 30.58 KG/M2 | SYSTOLIC BLOOD PRESSURE: 104 MMHG | DIASTOLIC BLOOD PRESSURE: 60 MMHG | WEIGHT: 162 LBS | HEIGHT: 61 IN

## 2019-07-23 ENCOUNTER — APPOINTMENT (OUTPATIENT)
Dept: OBGYN | Facility: CLINIC | Age: 34
End: 2019-07-23

## 2019-07-25 ENCOUNTER — APPOINTMENT (OUTPATIENT)
Dept: PEDIATRIC CARDIOLOGY | Facility: CLINIC | Age: 34
End: 2019-07-25
Payer: COMMERCIAL

## 2019-07-25 PROCEDURE — 99203 OFFICE O/P NEW LOW 30 MIN: CPT | Mod: 25

## 2019-07-25 PROCEDURE — 76821 MIDDLE CEREBRAL ARTERY ECHO: CPT

## 2019-07-25 PROCEDURE — 76825 ECHO EXAM OF FETAL HEART: CPT

## 2019-07-25 PROCEDURE — 93325 DOPPLER ECHO COLOR FLOW MAPG: CPT | Mod: 59

## 2019-07-25 PROCEDURE — 76827 ECHO EXAM OF FETAL HEART: CPT

## 2019-07-25 PROCEDURE — 76820 UMBILICAL ARTERY ECHO: CPT

## 2019-08-11 ENCOUNTER — RECORD ABSTRACTING (OUTPATIENT)
Age: 34
End: 2019-08-11

## 2019-08-11 DIAGNOSIS — B37.3 CANDIDIASIS OF VULVA AND VAGINA: ICD-10-CM

## 2019-08-11 DIAGNOSIS — Z3A.21 21 WEEKS GESTATION OF PREGNANCY: ICD-10-CM

## 2019-08-11 DIAGNOSIS — Z87.42 PERSONAL HISTORY OF OTHER DISEASES OF THE FEMALE GENITAL TRACT: ICD-10-CM

## 2019-08-13 ENCOUNTER — ASOB RESULT (OUTPATIENT)
Age: 34
End: 2019-08-13

## 2019-08-13 ENCOUNTER — NON-APPOINTMENT (OUTPATIENT)
Age: 34
End: 2019-08-13

## 2019-08-13 ENCOUNTER — APPOINTMENT (OUTPATIENT)
Dept: OBGYN | Facility: CLINIC | Age: 34
End: 2019-08-13
Payer: COMMERCIAL

## 2019-08-13 VITALS
BODY MASS INDEX: 32.28 KG/M2 | DIASTOLIC BLOOD PRESSURE: 68 MMHG | HEIGHT: 61 IN | WEIGHT: 171 LBS | SYSTOLIC BLOOD PRESSURE: 102 MMHG

## 2019-08-13 PROCEDURE — 82962 GLUCOSE BLOOD TEST: CPT | Mod: QW

## 2019-08-13 PROCEDURE — 36415 COLL VENOUS BLD VENIPUNCTURE: CPT

## 2019-08-13 PROCEDURE — 76817 TRANSVAGINAL US OBSTETRIC: CPT

## 2019-08-13 PROCEDURE — 76816 OB US FOLLOW-UP PER FETUS: CPT | Mod: 59

## 2019-08-13 PROCEDURE — 0502F SUBSEQUENT PRENATAL CARE: CPT

## 2019-08-14 ENCOUNTER — RESULT REVIEW (OUTPATIENT)
Age: 34
End: 2019-08-14

## 2019-08-15 LAB
BASOPHILS # BLD AUTO: 0.03 K/UL
BASOPHILS NFR BLD AUTO: 0.3 %
BILIRUB UR QL STRIP: NORMAL
EOSINOPHIL # BLD AUTO: 0.14 K/UL
EOSINOPHIL NFR BLD AUTO: 1.3 %
GLUCOSE 1H P 50 G GLC PO SERPL-MCNC: 175 MG/DL
GLUCOSE BLDC GLUCOMTR-MCNC: 117
GLUCOSE UR-MCNC: 100
HCG UR QL: 0.2 EU/DL
HCT VFR BLD CALC: 29.6 %
HGB BLD-MCNC: 9.3 G/DL
HGB UR QL STRIP.AUTO: NORMAL
IMM GRANULOCYTES NFR BLD AUTO: 1.4 %
KETONES UR-MCNC: NORMAL
LEUKOCYTE ESTERASE UR QL STRIP: NORMAL
LYMPHOCYTES # BLD AUTO: 2.32 K/UL
LYMPHOCYTES NFR BLD AUTO: 21.1 %
MAN DIFF?: NORMAL
MCHC RBC-ENTMCNC: 30.6 PG
MCHC RBC-ENTMCNC: 31.4 GM/DL
MCV RBC AUTO: 97.4 FL
MEV IGG FLD QL IA: 14.8 AU/ML
MEV IGG+IGM SER-IMP: NEGATIVE
MONOCYTES # BLD AUTO: 0.69 K/UL
MONOCYTES NFR BLD AUTO: 6.3 %
NEUTROPHILS # BLD AUTO: 7.67 K/UL
NEUTROPHILS NFR BLD AUTO: 69.6 %
NITRITE UR QL STRIP: NORMAL
PH UR STRIP: 7
PLATELET # BLD AUTO: 382 K/UL
PROT UR STRIP-MCNC: NORMAL
RBC # BLD: 3.04 M/UL
RBC # FLD: 13.2 %
SP GR UR STRIP: 1.01
WBC # FLD AUTO: 11 K/UL

## 2019-08-27 ENCOUNTER — ASOB RESULT (OUTPATIENT)
Age: 34
End: 2019-08-27

## 2019-08-27 ENCOUNTER — APPOINTMENT (OUTPATIENT)
Dept: MATERNAL FETAL MEDICINE | Facility: CLINIC | Age: 34
End: 2019-08-27
Payer: COMMERCIAL

## 2019-08-27 ENCOUNTER — APPOINTMENT (OUTPATIENT)
Dept: OBGYN | Facility: CLINIC | Age: 34
End: 2019-08-27
Payer: COMMERCIAL

## 2019-08-27 ENCOUNTER — NON-APPOINTMENT (OUTPATIENT)
Age: 34
End: 2019-08-27

## 2019-08-27 VITALS
WEIGHT: 171 LBS | SYSTOLIC BLOOD PRESSURE: 112 MMHG | HEIGHT: 61 IN | BODY MASS INDEX: 32.28 KG/M2 | DIASTOLIC BLOOD PRESSURE: 60 MMHG

## 2019-08-27 VITALS — HEIGHT: 61 IN | BODY MASS INDEX: 32.12 KG/M2 | WEIGHT: 170.13 LBS

## 2019-08-27 LAB
BILIRUB UR QL STRIP: NORMAL
COLLECTION METHOD: NORMAL
GLUCOSE UR-MCNC: NORMAL
HCG UR QL: 0.2 EU/DL
HGB UR QL STRIP.AUTO: NORMAL
KETONES UR-MCNC: NORMAL
LEUKOCYTE ESTERASE UR QL STRIP: NORMAL
NITRITE UR QL STRIP: NORMAL
PH UR STRIP: 6
PROT UR STRIP-MCNC: NORMAL
SP GR UR STRIP: 1

## 2019-08-27 PROCEDURE — G0108 DIAB MANAGE TRN  PER INDIV: CPT

## 2019-08-27 PROCEDURE — 0502F SUBSEQUENT PRENATAL CARE: CPT

## 2019-08-28 LAB
BASOPHILS # BLD AUTO: 0.04 K/UL
BASOPHILS NFR BLD AUTO: 0.3 %
EOSINOPHIL # BLD AUTO: 0.16 K/UL
EOSINOPHIL NFR BLD AUTO: 1.4 %
ESTIMATED AVERAGE GLUCOSE: 114 MG/DL
FERRITIN SERPL-MCNC: 8 NG/ML
FOLATE SERPL-MCNC: 14 NG/ML
GLUCOSE 1H P 100 G GLC PO SERPL-MCNC: 222 MG/DL
GLUCOSE 2H P CHAL SERPL-MCNC: 224 MG/DL
GLUCOSE 3H P CHAL SERPL-MCNC: 144 MG/DL
GLUCOSE BS SERPL-MCNC: 121 MG/DL
HBA1C MFR BLD HPLC: 5.6 %
HCT VFR BLD CALC: 30.9 %
HGB BLD-MCNC: 9.5 G/DL
IMM GRANULOCYTES NFR BLD AUTO: 1.2 %
IRON SATN MFR SERPL: 12 %
IRON SERPL-MCNC: 62 UG/DL
LYMPHOCYTES # BLD AUTO: 2.28 K/UL
LYMPHOCYTES NFR BLD AUTO: 19.8 %
MAN DIFF?: NORMAL
MCHC RBC-ENTMCNC: 29.6 PG
MCHC RBC-ENTMCNC: 30.7 GM/DL
MCV RBC AUTO: 96.3 FL
MONOCYTES # BLD AUTO: 0.66 K/UL
MONOCYTES NFR BLD AUTO: 5.7 %
NEUTROPHILS # BLD AUTO: 8.21 K/UL
NEUTROPHILS NFR BLD AUTO: 71.6 %
PLATELET # BLD AUTO: 371 K/UL
RBC # BLD: 3.21 M/UL
RBC # BLD: 3.21 M/UL
RBC # FLD: 13 %
RETICS # AUTO: 2.2 %
RETICS AGGREG/RBC NFR: 71.6 K/UL
TIBC SERPL-MCNC: 528 UG/DL
UIBC SERPL-MCNC: 466 UG/DL
VIT B12 SERPL-MCNC: 212 PG/ML
WBC # FLD AUTO: 11.49 K/UL

## 2019-08-29 ENCOUNTER — EMERGENCY (EMERGENCY)
Facility: HOSPITAL | Age: 34
LOS: 1 days | Discharge: HOME | End: 2019-08-29
Attending: EMERGENCY MEDICINE
Payer: COMMERCIAL

## 2019-08-29 ENCOUNTER — OUTPATIENT (OUTPATIENT)
Dept: INPATIENT UNIT | Facility: HOSPITAL | Age: 34
LOS: 1 days | End: 2019-08-29
Payer: COMMERCIAL

## 2019-08-29 VITALS
OXYGEN SATURATION: 99 % | SYSTOLIC BLOOD PRESSURE: 96 MMHG | HEART RATE: 82 BPM | DIASTOLIC BLOOD PRESSURE: 60 MMHG | TEMPERATURE: 98 F | HEIGHT: 61 IN | WEIGHT: 169.98 LBS | RESPIRATION RATE: 22 BRPM

## 2019-08-29 VITALS
RESPIRATION RATE: 18 BRPM | HEART RATE: 84 BPM | SYSTOLIC BLOOD PRESSURE: 116 MMHG | OXYGEN SATURATION: 98 % | DIASTOLIC BLOOD PRESSURE: 68 MMHG

## 2019-08-29 VITALS — SYSTOLIC BLOOD PRESSURE: 117 MMHG | DIASTOLIC BLOOD PRESSURE: 63 MMHG | HEART RATE: 85 BPM

## 2019-08-29 VITALS — HEART RATE: 85 BPM | OXYGEN SATURATION: 98 %

## 2019-08-29 DIAGNOSIS — O47.03 FALSE LABOR BEFORE 37 COMPLETED WEEKS OF GESTATION, THIRD TRIMESTER: ICD-10-CM

## 2019-08-29 DIAGNOSIS — Z98.890 OTHER SPECIFIED POSTPROCEDURAL STATES: Chronic | ICD-10-CM

## 2019-08-29 LAB
ALBUMIN SERPL ELPH-MCNC: 3.7 G/DL — SIGNIFICANT CHANGE UP (ref 3.3–5.2)
ALP SERPL-CCNC: 105 U/L — SIGNIFICANT CHANGE UP (ref 40–120)
ALT FLD-CCNC: 11 U/L — SIGNIFICANT CHANGE UP
ANION GAP SERPL CALC-SCNC: 14 MMOL/L — SIGNIFICANT CHANGE UP (ref 5–17)
APTT BLD: 29.2 SEC — SIGNIFICANT CHANGE UP (ref 27.5–36.3)
AST SERPL-CCNC: 15 U/L — SIGNIFICANT CHANGE UP
BILIRUB SERPL-MCNC: <0.2 MG/DL — LOW (ref 0.4–2)
BUN SERPL-MCNC: 10 MG/DL — SIGNIFICANT CHANGE UP (ref 8–20)
CALCIUM SERPL-MCNC: 9.3 MG/DL — SIGNIFICANT CHANGE UP (ref 8.6–10.2)
CHLORIDE SERPL-SCNC: 105 MMOL/L — SIGNIFICANT CHANGE UP (ref 98–107)
CO2 SERPL-SCNC: 18 MMOL/L — LOW (ref 22–29)
CREAT SERPL-MCNC: 0.44 MG/DL — LOW (ref 0.5–1.3)
D DIMER BLD IA.RAPID-MCNC: 1291 NG/ML DDU — HIGH
GLUCOSE SERPL-MCNC: 81 MG/DL — SIGNIFICANT CHANGE UP (ref 70–115)
HCT VFR BLD CALC: 30.2 % — LOW (ref 34.5–45)
HGB BLD-MCNC: 9.9 G/DL — LOW (ref 11.5–15.5)
INR BLD: 0.96 RATIO — SIGNIFICANT CHANGE UP (ref 0.88–1.16)
MCHC RBC-ENTMCNC: 30.7 PG — SIGNIFICANT CHANGE UP (ref 27–34)
MCHC RBC-ENTMCNC: 32.8 GM/DL — SIGNIFICANT CHANGE UP (ref 32–36)
MCV RBC AUTO: 93.5 FL — SIGNIFICANT CHANGE UP (ref 80–100)
NT-PROBNP SERPL-SCNC: 11 PG/ML — SIGNIFICANT CHANGE UP (ref 0–300)
PLATELET # BLD AUTO: 390 K/UL — SIGNIFICANT CHANGE UP (ref 150–400)
POTASSIUM SERPL-MCNC: 3.8 MMOL/L — SIGNIFICANT CHANGE UP (ref 3.5–5.3)
POTASSIUM SERPL-SCNC: 3.8 MMOL/L — SIGNIFICANT CHANGE UP (ref 3.5–5.3)
PROT SERPL-MCNC: 6.7 G/DL — SIGNIFICANT CHANGE UP (ref 6.6–8.7)
PROTHROM AB SERPL-ACNC: 11 SEC — SIGNIFICANT CHANGE UP (ref 10–12.9)
RBC # BLD: 3.23 M/UL — LOW (ref 3.8–5.2)
RBC # FLD: 13 % — SIGNIFICANT CHANGE UP (ref 10.3–14.5)
SODIUM SERPL-SCNC: 137 MMOL/L — SIGNIFICANT CHANGE UP (ref 135–145)
TROPONIN T SERPL-MCNC: <0.01 NG/ML — SIGNIFICANT CHANGE UP (ref 0–0.06)
WBC # BLD: 11 K/UL — HIGH (ref 3.8–10.5)
WBC # FLD AUTO: 11 K/UL — HIGH (ref 3.8–10.5)

## 2019-08-29 PROCEDURE — 85610 PROTHROMBIN TIME: CPT

## 2019-08-29 PROCEDURE — 85379 FIBRIN DEGRADATION QUANT: CPT

## 2019-08-29 PROCEDURE — 85027 COMPLETE CBC AUTOMATED: CPT

## 2019-08-29 PROCEDURE — 93970 EXTREMITY STUDY: CPT | Mod: 26

## 2019-08-29 PROCEDURE — 80053 COMPREHEN METABOLIC PANEL: CPT

## 2019-08-29 PROCEDURE — 71275 CT ANGIOGRAPHY CHEST: CPT

## 2019-08-29 PROCEDURE — 93005 ELECTROCARDIOGRAM TRACING: CPT

## 2019-08-29 PROCEDURE — 71045 X-RAY EXAM CHEST 1 VIEW: CPT | Mod: 26

## 2019-08-29 PROCEDURE — 93970 EXTREMITY STUDY: CPT

## 2019-08-29 PROCEDURE — 99284 EMERGENCY DEPT VISIT MOD MDM: CPT | Mod: 25

## 2019-08-29 PROCEDURE — 83880 ASSAY OF NATRIURETIC PEPTIDE: CPT

## 2019-08-29 PROCEDURE — 93010 ELECTROCARDIOGRAM REPORT: CPT

## 2019-08-29 PROCEDURE — 84484 ASSAY OF TROPONIN QUANT: CPT

## 2019-08-29 PROCEDURE — 85730 THROMBOPLASTIN TIME PARTIAL: CPT

## 2019-08-29 PROCEDURE — G0463: CPT

## 2019-08-29 PROCEDURE — 82962 GLUCOSE BLOOD TEST: CPT

## 2019-08-29 PROCEDURE — 71045 X-RAY EXAM CHEST 1 VIEW: CPT

## 2019-08-29 PROCEDURE — 99285 EMERGENCY DEPT VISIT HI MDM: CPT

## 2019-08-29 PROCEDURE — 71275 CT ANGIOGRAPHY CHEST: CPT | Mod: 26

## 2019-08-29 PROCEDURE — 36415 COLL VENOUS BLD VENIPUNCTURE: CPT

## 2019-08-29 NOTE — ED PROVIDER NOTE - OBJECTIVE STATEMENT
Ms. Gloria Peck is a 33F presenting at 30 3/7 with SOB. Patient describes 12 hour history SOB that woke her up from sleep this AM. SOB is worsening over the past 12 hours without any prior episodes of SOB. Pt denies any pain, but endorses light-headed feeling and intermittent "hot flashes" that she states are consistent throughout the course of her pregnancy. Patient was recently diagnosed with GDM and is diet controlled. Denies recent travel, denies fever/chills, nausea/vomiting, chest pain, pain with inspiration, cough.

## 2019-08-29 NOTE — ED PROVIDER NOTE - NS ED ATTENDING STATEMENT MOD
I have personally seen and examined this patient. I have fully participated in the care of this patient. I have reviewed all pertinent clinical information, including history, physical exam, plan and the Medical Student's note and agree except as noted. Attending Only

## 2019-08-29 NOTE — ED PROVIDER NOTE - PATIENT PORTAL LINK FT
You can access the FollowMyHealth Patient Portal offered by [facility] by registering at the following website: [email or web address]. By joining [email or web address]’s FollowMyHealth portal, you will also be able to view your health information using other applications (apps) compatible with our system.

## 2019-08-29 NOTE — ED ADULT TRIAGE NOTE - CHIEF COMPLAINT QUOTE
pt came from L&D c/o SOB that started this morning and numbness to bilat hands and lightheadedness, pt 30week pregnant with gestational DM, resp even and tachypneic

## 2019-08-29 NOTE — OB PROVIDER TRIAGE NOTE - HISTORY OF PRESENT ILLNESS
Gloria was brought to L&D by her Nurse Manager from the [place] office when she complained of worsening shortness of breath. She denies any h/o asthma or other chronic respiratory conditions. She has no sign of an URI. She is afebrile and has a 99% saturation of O2 on room air (Pulse Oximetry). She is normotensive and slightly tachycardic. She c/o some timgling in her hands and feet probably secondary to hyperventilation. Her physical exam is otherwise unremarkable and the uterus is soft and acontractile with good fetal movement. FHR tracing is stable, no decelerations.   We discussed the possibilities and she was transferred to the ED for further evaluation which will include lower extremity duplex dopplers to r/o DVT and she may require a spiral CT pulmonary angiogram. All questions were answered and support was given.

## 2019-08-29 NOTE — ED PROVIDER NOTE - RESPIRATORY, MLM
Breath sounds clear and equal bilaterally. Tachypneic on exam, aerating well b/l lungs, no wheezes rales or rhonchi, no inspiratory stridor

## 2019-08-29 NOTE — ED PROVIDER NOTE - CLINICAL SUMMARY MEDICAL DECISION MAKING FREE TEXT BOX
Ms. Gloria Peck is a 33F presenting at 30 3/7 wga with 12 hours SOB. Given pregnancy and timing of symptoms will obtain D-dimer, doppler u/s, spiral CT to assess PE

## 2019-08-29 NOTE — ED PROVIDER NOTE - ATTENDING CONTRIBUTION TO CARE
32 yo female pmh 30 weeks pregnant with 12 hour history of shortness of breath; pt seen at labor and delivery  and sent for further evaluation of sob; denies fever ,chills nausea or vomting;    pe awake alert in nad heent ncat neck supple chest clear no wheezing abd soft ext  edema;   dx sob eval  pe , infection , chf;  labs, xray , us dopp of legs and ct angio chest;

## 2019-08-29 NOTE — ED ADULT NURSE REASSESSMENT NOTE - NS ED NURSE REASSESS COMMENT FT1
assumed care of pt @ 1945. pt resting comfortably on stretcher at this time. pt appears to be in no acute distress. maintaining airway on room air. breaths even and unlabored. pt has no complaints at this time. pt states " I feel much better". vitals stable at this time. will continue to monitor.

## 2019-09-09 PROBLEM — O24.419 GESTATIONAL DIABETES MELLITUS IN PREGNANCY, UNSPECIFIED CONTROL: Chronic | Status: ACTIVE | Noted: 2019-08-29

## 2019-09-10 ENCOUNTER — APPOINTMENT (OUTPATIENT)
Dept: ANTEPARTUM | Facility: CLINIC | Age: 34
End: 2019-09-10
Payer: COMMERCIAL

## 2019-09-10 ENCOUNTER — APPOINTMENT (OUTPATIENT)
Dept: MATERNAL FETAL MEDICINE | Facility: CLINIC | Age: 34
End: 2019-09-10
Payer: COMMERCIAL

## 2019-09-10 ENCOUNTER — ASOB RESULT (OUTPATIENT)
Age: 34
End: 2019-09-10

## 2019-09-10 VITALS
SYSTOLIC BLOOD PRESSURE: 100 MMHG | DIASTOLIC BLOOD PRESSURE: 62 MMHG | WEIGHT: 168 LBS | BODY MASS INDEX: 31.72 KG/M2 | HEART RATE: 80 BPM | HEIGHT: 61 IN

## 2019-09-10 PROCEDURE — 76816 OB US FOLLOW-UP PER FETUS: CPT

## 2019-09-10 PROCEDURE — 99214 OFFICE O/P EST MOD 30 MIN: CPT | Mod: 25

## 2019-09-10 PROCEDURE — 76820 UMBILICAL ARTERY ECHO: CPT

## 2019-09-10 PROCEDURE — 93976 VASCULAR STUDY: CPT

## 2019-09-10 PROCEDURE — 76819 FETAL BIOPHYS PROFIL W/O NST: CPT

## 2019-09-10 RX ORDER — DOXYLAMINE SUCCINATE AND PYRIDOXINE HYDROCHLORIDE 10; 10 MG/1; MG/1
10-10 TABLET, DELAYED RELEASE ORAL
Qty: 120 | Refills: 2 | Status: DISCONTINUED | COMMUNITY
Start: 2019-04-01 | End: 2019-09-10

## 2019-09-10 RX ORDER — FLUCONAZOLE 150 MG/1
150 TABLET ORAL
Qty: 2 | Refills: 2 | Status: DISCONTINUED | COMMUNITY
Start: 2019-06-24 | End: 2019-09-10

## 2019-09-10 NOTE — VITALS
[LMP (date): ___] : LMP was on [unfilled] [GA =___ Weeks] : which calculates to a GA of [unfilled] weeks [GA= ___ Days] : and [unfilled] day(s) [JEWELL by LMP (date): ___] : The calculated JEWELL by LMP is [unfilled] [By LMP] : this is the final JEWELL

## 2019-09-10 NOTE — FAMILY HISTORY
[Age 35+ During Pregnancy] : not 35 or over during pregnancy [Reported Family History Of Birth Defects] : no neural tube defect [Herb-Sachs Carrier] : no Herb-Sachs [Family History] : no mental retardation/autism [Reported Family History Of Genetic Disease] : no maternal metabolic disorder [FreeTextEntry1] : Pt states Husbands uncle has down syndrome

## 2019-09-10 NOTE — DISCUSSION/SUMMARY
[FreeTextEntry1] : She is 32 weeks and 1 day gestation by her last menstrual period dates.\par \par Regarding her gestational diabetes, she states that she has been following a diabetic diet. She has been eating 3 meals with a morning snack, no afternoon or evening snacks. I told her to eat three daily meals with 3 snacks to reduce postprandial glucose fluctuations.  I provided nutritional counseling. She performs fasting and 1 hour postprandial self glucose monitoring from the beginning of the meal. My review of her log book revealed most fasting glucose values to be elevated. She also has occasional elevated postprandial glucose elevations due to dietary choices. She appears to be in suboptimal glycemic control. I informed her that maintaining euglycemia is the most important factor associated with good  outcomes in pregnancies complicated by gestational diabetes. I told her that poor glucose control may cause fetal macrosomia, shoulder dystocia,  delivery,  respiratory distress syndrome and  metabolic complications such as hypoglycemia and hyperbilirubinemia. I told her that she is at risk for developing gestational hypertension and preeclampsia during the current pregnancy. I also told her that she is at risk for developing type 2 diabetes, metabolic syndrome and cardiovascular disease later in life.  I also recommend a 75 gram 2 hour OGTT approximately 6 - 8 weeks postpartum to determine whether she has impaired glucose tolerance or preexisting diabetes not diagnosed prior to the pregnancy. Hemoglobin A1c done on 2019 was 5.7%.  She was advised to take medication to lower her fasting glucose levels. She was informed that insulin is the first line of medication given to lower glucose levels during pregnancy. She told me that she would like to take an oral hypoglycemic agent to lower the fasting glucose. She was then advised to take metformin 500 mg at bedtime with a snack to lower the fasting glucose levels. I ordered liver function tests. I also believe that she needs more nutritional counseling. I made arrangements for her to see our diabetes educator as soon as possible. \par

## 2019-09-10 NOTE — OB HISTORY
[LMP: ___] : LMP: [unfilled] [JEWELL: ___] : JEWELL: [unfilled] [EGA: ___ wks] : EGA: [unfilled] wks [Spontaneous] : Spontaneous conception [Sonogram] : sonogram [at ___ wks] : at [unfilled] weeks [Definite:  ___ (Date)] : the last menstrual period was [unfilled] [Normal Amount/Duration] : was of a normal amount and duration [Regular Cycle Intervals] : periods have been regular [FreeTextEntry1] : First prenatal visit was April 1, 2019.\par \par A 2 hour GTT done on April 18, 2019 to screen for gestational diabetes was reported to be within normal limits.\par \par A first trimester screening test done on April 23, 2019 reported to be a low risk for Down syndrome and trisomy 18/13. The maternal FILI- A  level was noted to be low at the  2.5 percentile.  She had a maternal fetal medicine consultation with me on May 9, 2019 due to her low FILI A level.\par \par A 3 hour glucose tolerance test done on August 17, 2019 reported a fasting glucose of 121, one-hour glucose of 222, two-hour glucose of 224, and 3 hour glucose of 144. There were 3 abnormal glucose values consistent with a diagnosis of gestational diabetes. She was seen by our diabetes educator on August 27, 2019 for initial diabetes education and counseling.\par \par  [Spotting Between  Menses] : no spotting between menses [Menstrual Cramps] : no menstrual cramps

## 2019-09-10 NOTE — SURGICAL HISTORY
[Fibroids] : fibroids [Abn Paps] : abnormal pap smear [Breast Disease] : no breast disease [STI's] : no STI's [Infertility] : no infertility [Cysts] : no cysts [OC Use] : no OC use [Last Mammo: ___] : Last Mammo: none [de-identified] : Left lateral fibroid.  Hx of abd pap in 2009 s/p colposcopy and LEEP in 2010.  Hx of HPV, most recent PAP wnl.

## 2019-09-10 NOTE — PAST MEDICAL HISTORY
[Exposure To Gonorrhea] : no gonorrhea [HIV Infection] : no HIV [Syphilis] : no syphilis [Chlamydial Infections] : no chlamydia [Herpes Simplex] : no genital herpes [Human Papilloma Virus Infection] : no genital warts [Hepatitis, B Virus] : no Hepatitis B [Hepatitis, C Virus] : no Hepatitis C [Trichomoniasis] : no trichomoniasis

## 2019-09-11 ENCOUNTER — NON-APPOINTMENT (OUTPATIENT)
Age: 34
End: 2019-09-11

## 2019-09-11 ENCOUNTER — APPOINTMENT (OUTPATIENT)
Dept: OBGYN | Facility: CLINIC | Age: 34
End: 2019-09-11
Payer: COMMERCIAL

## 2019-09-11 VITALS
SYSTOLIC BLOOD PRESSURE: 124 MMHG | HEIGHT: 61 IN | DIASTOLIC BLOOD PRESSURE: 72 MMHG | WEIGHT: 169 LBS | BODY MASS INDEX: 31.91 KG/M2

## 2019-09-11 LAB
ALBUMIN SERPL ELPH-MCNC: 3.7 G/DL
ALP BLD-CCNC: 121 U/L
ALT SERPL-CCNC: 9 U/L
AST SERPL-CCNC: 14 U/L
BILIRUB DIRECT SERPL-MCNC: 0.1 MG/DL
BILIRUB INDIRECT SERPL-MCNC: 0.1 MG/DL
BILIRUB SERPL-MCNC: 0.2 MG/DL
BILIRUB UR QL STRIP: NORMAL
GLUCOSE UR-MCNC: NORMAL
HCG UR QL: 0.2 EU/DL
HGB UR QL STRIP.AUTO: ABNORMAL
KETONES UR-MCNC: ABNORMAL
LEUKOCYTE ESTERASE UR QL STRIP: NORMAL
NITRITE UR QL STRIP: NORMAL
PH UR STRIP: 6
PROT SERPL-MCNC: 6.2 G/DL
PROT UR STRIP-MCNC: NORMAL
SP GR UR STRIP: 1.02

## 2019-09-11 PROCEDURE — 90471 IMMUNIZATION ADMIN: CPT

## 2019-09-11 PROCEDURE — 0502F SUBSEQUENT PRENATAL CARE: CPT

## 2019-09-11 PROCEDURE — 90715 TDAP VACCINE 7 YRS/> IM: CPT

## 2019-09-13 ENCOUNTER — APPOINTMENT (OUTPATIENT)
Dept: MATERNAL FETAL MEDICINE | Facility: CLINIC | Age: 34
End: 2019-09-13

## 2019-09-13 ENCOUNTER — APPOINTMENT (OUTPATIENT)
Dept: ANTEPARTUM | Facility: CLINIC | Age: 34
End: 2019-09-13

## 2019-09-24 ENCOUNTER — APPOINTMENT (OUTPATIENT)
Dept: MATERNAL FETAL MEDICINE | Facility: CLINIC | Age: 34
End: 2019-09-24

## 2019-09-26 ENCOUNTER — ASOB RESULT (OUTPATIENT)
Age: 34
End: 2019-09-26

## 2019-09-26 ENCOUNTER — APPOINTMENT (OUTPATIENT)
Dept: OBGYN | Facility: CLINIC | Age: 34
End: 2019-09-26
Payer: COMMERCIAL

## 2019-09-26 ENCOUNTER — APPOINTMENT (OUTPATIENT)
Dept: MATERNAL FETAL MEDICINE | Facility: CLINIC | Age: 34
End: 2019-09-26
Payer: COMMERCIAL

## 2019-09-26 ENCOUNTER — NON-APPOINTMENT (OUTPATIENT)
Age: 34
End: 2019-09-26

## 2019-09-26 VITALS
BODY MASS INDEX: 31.91 KG/M2 | DIASTOLIC BLOOD PRESSURE: 70 MMHG | HEIGHT: 61 IN | SYSTOLIC BLOOD PRESSURE: 100 MMHG | WEIGHT: 169 LBS

## 2019-09-26 VITALS — WEIGHT: 169.06 LBS | BODY MASS INDEX: 31.92 KG/M2 | HEIGHT: 61 IN

## 2019-09-26 PROCEDURE — 11421 EXC H-F-NK-SP B9+MARG 0.6-1: CPT

## 2019-09-26 PROCEDURE — G0108 DIAB MANAGE TRN  PER INDIV: CPT

## 2019-09-26 PROCEDURE — 0502F SUBSEQUENT PRENATAL CARE: CPT

## 2019-10-01 ENCOUNTER — ASOB RESULT (OUTPATIENT)
Age: 34
End: 2019-10-01

## 2019-10-01 ENCOUNTER — NON-APPOINTMENT (OUTPATIENT)
Age: 34
End: 2019-10-01

## 2019-10-01 ENCOUNTER — APPOINTMENT (OUTPATIENT)
Dept: OBGYN | Facility: CLINIC | Age: 34
End: 2019-10-01
Payer: COMMERCIAL

## 2019-10-01 LAB
BILIRUB UR QL STRIP: NORMAL
GLUCOSE UR-MCNC: NORMAL
HCG UR QL: 0.2 EU/DL
HGB UR QL STRIP.AUTO: ABNORMAL
KETONES UR-MCNC: NORMAL
LEUKOCYTE ESTERASE UR QL STRIP: NORMAL
NITRITE UR QL STRIP: NORMAL
PH UR STRIP: 6
PROT UR STRIP-MCNC: ABNORMAL
SP GR UR STRIP: 1.03

## 2019-10-01 PROCEDURE — 0502F SUBSEQUENT PRENATAL CARE: CPT

## 2019-10-01 PROCEDURE — 76818 FETAL BIOPHYS PROFILE W/NST: CPT

## 2019-10-01 PROCEDURE — 36415 COLL VENOUS BLD VENIPUNCTURE: CPT

## 2019-10-02 LAB
ALBUMIN SERPL ELPH-MCNC: 3.7 G/DL
ALP BLD-CCNC: 142 U/L
ALT SERPL-CCNC: 7 U/L
ANION GAP SERPL CALC-SCNC: 16 MMOL/L
AST SERPL-CCNC: 13 U/L
BASOPHILS # BLD AUTO: 0.03 K/UL
BASOPHILS NFR BLD AUTO: 0.3 %
BILIRUB SERPL-MCNC: 0.2 MG/DL
BUN SERPL-MCNC: 8 MG/DL
CALCIUM SERPL-MCNC: 9 MG/DL
CHLORIDE SERPL-SCNC: 102 MMOL/L
CO2 SERPL-SCNC: 21 MMOL/L
CREAT SERPL-MCNC: 0.53 MG/DL
EOSINOPHIL # BLD AUTO: 0.1 K/UL
EOSINOPHIL NFR BLD AUTO: 1.2 %
GLUCOSE SERPL-MCNC: 72 MG/DL
HCT VFR BLD CALC: 31.7 %
HGB BLD-MCNC: 9.8 G/DL
HIV1+2 AB SPEC QL IA.RAPID: NONREACTIVE
IMM GRANULOCYTES NFR BLD AUTO: 0.9 %
LYMPHOCYTES # BLD AUTO: 1.56 K/UL
LYMPHOCYTES NFR BLD AUTO: 18 %
MAN DIFF?: NORMAL
MCHC RBC-ENTMCNC: 28.9 PG
MCHC RBC-ENTMCNC: 30.9 GM/DL
MCV RBC AUTO: 93.5 FL
MONOCYTES # BLD AUTO: 0.42 K/UL
MONOCYTES NFR BLD AUTO: 4.9 %
NEUTROPHILS # BLD AUTO: 6.46 K/UL
NEUTROPHILS NFR BLD AUTO: 74.7 %
PLATELET # BLD AUTO: 423 K/UL
POTASSIUM SERPL-SCNC: 3.9 MMOL/L
PROT SERPL-MCNC: 5.9 G/DL
RBC # BLD: 3.39 M/UL
RBC # FLD: 13.9 %
SODIUM SERPL-SCNC: 139 MMOL/L
WBC # FLD AUTO: 8.65 K/UL

## 2019-10-04 LAB
B-HEM STREP SPEC QL CULT: NORMAL
BILE AC SER-MCNC: 2.3 UMOL/L
CORE LAB BIOPSY: NORMAL
MEV IGG FLD QL IA: 18.9 AU/ML
MEV IGG+IGM SER-IMP: POSITIVE

## 2019-10-07 ENCOUNTER — APPOINTMENT (OUTPATIENT)
Dept: OBGYN | Facility: CLINIC | Age: 34
End: 2019-10-07
Payer: COMMERCIAL

## 2019-10-07 ENCOUNTER — NON-APPOINTMENT (OUTPATIENT)
Age: 34
End: 2019-10-07

## 2019-10-07 ENCOUNTER — ASOB RESULT (OUTPATIENT)
Age: 34
End: 2019-10-07

## 2019-10-07 VITALS
HEIGHT: 61 IN | BODY MASS INDEX: 32.28 KG/M2 | WEIGHT: 171 LBS | SYSTOLIC BLOOD PRESSURE: 100 MMHG | DIASTOLIC BLOOD PRESSURE: 64 MMHG

## 2019-10-07 LAB
BILIRUB UR QL STRIP: NORMAL
GLUCOSE UR-MCNC: NORMAL
HCG UR QL: 0.2 EU/DL
HGB UR QL STRIP.AUTO: ABNORMAL
KETONES UR-MCNC: NORMAL
LEUKOCYTE ESTERASE UR QL STRIP: NORMAL
NITRITE UR QL STRIP: NORMAL
PH UR STRIP: 6
PROT UR STRIP-MCNC: NORMAL
SP GR UR STRIP: 1

## 2019-10-07 PROCEDURE — 76818 FETAL BIOPHYS PROFILE W/NST: CPT

## 2019-10-07 PROCEDURE — 76816 OB US FOLLOW-UP PER FETUS: CPT | Mod: 59

## 2019-10-07 PROCEDURE — 0502F SUBSEQUENT PRENATAL CARE: CPT

## 2019-10-08 ENCOUNTER — APPOINTMENT (OUTPATIENT)
Dept: ANTEPARTUM | Facility: CLINIC | Age: 34
End: 2019-10-08

## 2019-10-10 ENCOUNTER — APPOINTMENT (OUTPATIENT)
Dept: MATERNAL FETAL MEDICINE | Facility: CLINIC | Age: 34
End: 2019-10-10
Payer: COMMERCIAL

## 2019-10-10 ENCOUNTER — APPOINTMENT (OUTPATIENT)
Dept: ANTEPARTUM | Facility: CLINIC | Age: 34
End: 2019-10-10

## 2019-10-10 VITALS
DIASTOLIC BLOOD PRESSURE: 60 MMHG | SYSTOLIC BLOOD PRESSURE: 105 MMHG | BODY MASS INDEX: 31.94 KG/M2 | HEIGHT: 61 IN | WEIGHT: 169.19 LBS

## 2019-10-10 PROCEDURE — 99214 OFFICE O/P EST MOD 30 MIN: CPT

## 2019-10-10 NOTE — DISCUSSION/SUMMARY
[FreeTextEntry1] : Please see the previous consultation for the patient's medical and obstetrical history. She is currently 36 weeks pregnant with gestational diabetes on metformin 500 mg at bedtime as well as a low FILI-A at the 2.5 percentile.\par \par Nonstress test was performed today and was category one. A growth scan was performed in your office on October 7 which revealed a single viable intrauterine gestation with estimated fetal weight at 6 lbs. 12 oz. which is consistent with the 74th percentile. Vertex presentation with an anterior placenta seen and the amniotic fluid index was normal at 14.07 cm. Evaluation of her diabetic flow sheets reveals the all of her fasting and postprandial blood sugars were found to be within normal limits. Vital signs today reveal blood pressure of 100/64 and maternal weight is 171 pounds which is a 2 pound weight gain from the evaluation done on September 26. This is consistent with a BMI of 32.31KG.\par \par Management of the pregnancy was discussed. The patient will continue on metformin 500 mg p.o. at bedtime. Hemoglobin A1c was performed in August and was normal at 5.6%. She will continue with weekly biophysical profile and NST. A growth scan in one month is recommended if undelivered. A 75 g two-hour GCT after her postprandial visit is also recommended. No change in the current medical management is indicated. All of the above was discussed with the patient, all questions were answered.\par \par I spent a total of 30 minutes of which greater than 50% was spent counseling and coordinating care as described below.\par \par Recommendations;\par \par #1. Continue metformin 500 mg p.o. at bedtime.\par #2. Continue current ADA diet and home glucose monitoring.\par #3. Continue baby aspirin until 38 weeks.\par #4. Weekly biophysical profile and NST are recommended.\par #5. Delivery by the patient's EDC is recommended.

## 2019-10-14 ENCOUNTER — ASOB RESULT (OUTPATIENT)
Age: 34
End: 2019-10-14

## 2019-10-14 ENCOUNTER — APPOINTMENT (OUTPATIENT)
Dept: OBGYN | Facility: CLINIC | Age: 34
End: 2019-10-14
Payer: COMMERCIAL

## 2019-10-14 ENCOUNTER — NON-APPOINTMENT (OUTPATIENT)
Age: 34
End: 2019-10-14

## 2019-10-14 VITALS — DIASTOLIC BLOOD PRESSURE: 66 MMHG | WEIGHT: 172 LBS | BODY MASS INDEX: 32.5 KG/M2 | SYSTOLIC BLOOD PRESSURE: 114 MMHG

## 2019-10-14 PROCEDURE — 76818 FETAL BIOPHYS PROFILE W/NST: CPT

## 2019-10-14 PROCEDURE — 0502F SUBSEQUENT PRENATAL CARE: CPT

## 2019-10-21 ENCOUNTER — APPOINTMENT (OUTPATIENT)
Dept: OBGYN | Facility: CLINIC | Age: 34
End: 2019-10-21
Payer: COMMERCIAL

## 2019-10-21 ENCOUNTER — APPOINTMENT (OUTPATIENT)
Dept: MATERNAL FETAL MEDICINE | Facility: CLINIC | Age: 34
End: 2019-10-21

## 2019-10-21 VITALS — SYSTOLIC BLOOD PRESSURE: 120 MMHG | BODY MASS INDEX: 32.31 KG/M2 | DIASTOLIC BLOOD PRESSURE: 68 MMHG | WEIGHT: 171 LBS

## 2019-10-21 LAB
BILIRUB UR QL STRIP: NORMAL
GLUCOSE UR-MCNC: NORMAL
HCG UR QL: 0.2 EU/DL
HGB UR QL STRIP.AUTO: ABNORMAL
KETONES UR-MCNC: NORMAL
LEUKOCYTE ESTERASE UR QL STRIP: NORMAL
NITRITE UR QL STRIP: NORMAL
PH UR STRIP: 7
PROT UR STRIP-MCNC: NORMAL
SP GR UR STRIP: 1.01

## 2019-10-21 PROCEDURE — 76818 FETAL BIOPHYS PROFILE W/NST: CPT

## 2019-10-21 PROCEDURE — 0502F SUBSEQUENT PRENATAL CARE: CPT

## 2019-10-22 ENCOUNTER — APPOINTMENT (OUTPATIENT)
Dept: OBGYN | Facility: HOSPITAL | Age: 34
End: 2019-10-22

## 2019-10-23 LAB
BILIRUB UR QL STRIP: NORMAL
GLUCOSE UR-MCNC: NORMAL
HCG UR QL: 0.2 EU/DL
HGB UR QL STRIP.AUTO: ABNORMAL
KETONES UR-MCNC: NORMAL
LEUKOCYTE ESTERASE UR QL STRIP: NORMAL
NITRITE UR QL STRIP: NORMAL
PH UR STRIP: 7
PROT UR STRIP-MCNC: NORMAL
SP GR UR STRIP: 1

## 2019-10-27 ENCOUNTER — OUTPATIENT (OUTPATIENT)
Dept: INPATIENT UNIT | Facility: HOSPITAL | Age: 34
LOS: 1 days | End: 2019-10-27
Payer: COMMERCIAL

## 2019-10-27 VITALS — TEMPERATURE: 99 F

## 2019-10-27 DIAGNOSIS — Z98.890 OTHER SPECIFIED POSTPROCEDURAL STATES: Chronic | ICD-10-CM

## 2019-10-27 DIAGNOSIS — O47.1 FALSE LABOR AT OR AFTER 37 COMPLETED WEEKS OF GESTATION: ICD-10-CM

## 2019-10-27 LAB
APPEARANCE UR: CLEAR — SIGNIFICANT CHANGE UP
BACTERIA # UR AUTO: ABNORMAL
BASOPHILS # BLD AUTO: 0.03 K/UL — SIGNIFICANT CHANGE UP (ref 0–0.2)
BASOPHILS NFR BLD AUTO: 0.3 % — SIGNIFICANT CHANGE UP (ref 0–2)
BILIRUB UR-MCNC: NEGATIVE — SIGNIFICANT CHANGE UP
COLOR SPEC: YELLOW — SIGNIFICANT CHANGE UP
CREAT ?TM UR-MCNC: 85 MG/DL — SIGNIFICANT CHANGE UP
DIFF PNL FLD: ABNORMAL
EOSINOPHIL # BLD AUTO: 0.12 K/UL — SIGNIFICANT CHANGE UP (ref 0–0.5)
EOSINOPHIL NFR BLD AUTO: 1.2 % — SIGNIFICANT CHANGE UP (ref 0–6)
EPI CELLS # UR: ABNORMAL
GLUCOSE BLDC GLUCOMTR-MCNC: 100 MG/DL — HIGH (ref 70–99)
GLUCOSE UR QL: NEGATIVE MG/DL — SIGNIFICANT CHANGE UP
HCT VFR BLD CALC: 30.3 % — LOW (ref 34.5–45)
HGB BLD-MCNC: 9.9 G/DL — LOW (ref 11.5–15.5)
IMM GRANULOCYTES NFR BLD AUTO: 0.8 % — SIGNIFICANT CHANGE UP (ref 0–1.5)
KETONES UR-MCNC: NEGATIVE — SIGNIFICANT CHANGE UP
LEUKOCYTE ESTERASE UR-ACNC: ABNORMAL
LYMPHOCYTES # BLD AUTO: 2.5 K/UL — SIGNIFICANT CHANGE UP (ref 1–3.3)
LYMPHOCYTES # BLD AUTO: 25 % — SIGNIFICANT CHANGE UP (ref 13–44)
MCHC RBC-ENTMCNC: 29.1 PG — SIGNIFICANT CHANGE UP (ref 27–34)
MCHC RBC-ENTMCNC: 32.7 GM/DL — SIGNIFICANT CHANGE UP (ref 32–36)
MCV RBC AUTO: 89.1 FL — SIGNIFICANT CHANGE UP (ref 80–100)
MONOCYTES # BLD AUTO: 0.66 K/UL — SIGNIFICANT CHANGE UP (ref 0–0.9)
MONOCYTES NFR BLD AUTO: 6.6 % — SIGNIFICANT CHANGE UP (ref 2–14)
NEUTROPHILS # BLD AUTO: 6.63 K/UL — SIGNIFICANT CHANGE UP (ref 1.8–7.4)
NEUTROPHILS NFR BLD AUTO: 66.1 % — SIGNIFICANT CHANGE UP (ref 43–77)
NITRITE UR-MCNC: NEGATIVE — SIGNIFICANT CHANGE UP
PH UR: 6.5 — SIGNIFICANT CHANGE UP (ref 5–8)
PLATELET # BLD AUTO: 325 K/UL — SIGNIFICANT CHANGE UP (ref 150–400)
PROT ?TM UR-MCNC: 9 MG/DL — SIGNIFICANT CHANGE UP (ref 0–12)
PROT UR-MCNC: 15 MG/DL
PROT/CREAT UR-RTO: 0.1 RATIO — SIGNIFICANT CHANGE UP
RBC # BLD: 3.4 M/UL — LOW (ref 3.8–5.2)
RBC # FLD: 14.1 % — SIGNIFICANT CHANGE UP (ref 10.3–14.5)
RBC CASTS # UR COMP ASSIST: ABNORMAL /HPF (ref 0–4)
SP GR SPEC: 1.01 — SIGNIFICANT CHANGE UP (ref 1.01–1.02)
UROBILINOGEN FLD QL: NEGATIVE MG/DL — SIGNIFICANT CHANGE UP
WBC # BLD: 10.02 K/UL — SIGNIFICANT CHANGE UP (ref 3.8–10.5)
WBC # FLD AUTO: 10.02 K/UL — SIGNIFICANT CHANGE UP (ref 3.8–10.5)
WBC UR QL: ABNORMAL

## 2019-10-27 PROCEDURE — 84156 ASSAY OF PROTEIN URINE: CPT

## 2019-10-27 PROCEDURE — 85384 FIBRINOGEN ACTIVITY: CPT

## 2019-10-27 PROCEDURE — 85610 PROTHROMBIN TIME: CPT

## 2019-10-27 PROCEDURE — 59025 FETAL NON-STRESS TEST: CPT

## 2019-10-27 PROCEDURE — 36415 COLL VENOUS BLD VENIPUNCTURE: CPT

## 2019-10-27 PROCEDURE — 82570 ASSAY OF URINE CREATININE: CPT

## 2019-10-27 PROCEDURE — 82962 GLUCOSE BLOOD TEST: CPT

## 2019-10-27 PROCEDURE — 85027 COMPLETE CBC AUTOMATED: CPT

## 2019-10-27 PROCEDURE — 85730 THROMBOPLASTIN TIME PARTIAL: CPT

## 2019-10-27 PROCEDURE — 83615 LACTATE (LD) (LDH) ENZYME: CPT

## 2019-10-27 PROCEDURE — G0463: CPT

## 2019-10-27 PROCEDURE — 80053 COMPREHEN METABOLIC PANEL: CPT

## 2019-10-27 PROCEDURE — 84550 ASSAY OF BLOOD/URIC ACID: CPT

## 2019-10-27 PROCEDURE — 59050 FETAL MONITOR W/REPORT: CPT

## 2019-10-27 PROCEDURE — 81001 URINALYSIS AUTO W/SCOPE: CPT

## 2019-10-27 NOTE — OB RN TRIAGE NOTE - NS_GESTAGE_OBGYN_ALL_OB_FT
38w6d impaired balance/decreased strength impaired balance/pain/decreased strength/impaired postural control/decreased ROM

## 2019-10-27 NOTE — OB PROVIDER TRIAGE NOTE - NSHPPHYSICALEXAM_GEN_ALL_CORE
General: Alert and oriented x3, NAD  Abd: Soft, nontender, gravid  SVE: deferred    Vital Signs Last 24 Hrs  T(C): 37 (27 Oct 2019 22:08), Max: 37.0 (27 Oct 2019 22:05)  T(F): 98.6 (27 Oct 2019 22:08), Max: 98.6 (27 Oct 2019 22:05)  HR: 82 (27 Oct 2019 22:29) (81 - 82)  BP: 108/62 (27 Oct 2019 22:29) (108/62 - 111/63)  RR: 17 (27 Oct 2019 22:08) (17 - 17)    FHT: 140bpm - category I tracing  [place]: Irregular ctxs>8 min apart

## 2019-10-27 NOTE — OB PROVIDER TRIAGE NOTE - HISTORY OF PRESENT ILLNESS
Patient is a 34yo  at 38w6d presenting today for an elevated BP reading at a friend's house to 144/90. Patient was walking and started to feel dizzy, sat down and the dizziness resolved. She took her BP at the time and it was 144/90. Fingerstick at the time was 130. When she got home later and took her blood pressure it was 126/70. She was told to come to L&D to be evaluated for the elevated reading. She has a mild 3/10 frontal headache that started after the episode but otherwise feels well. Denies vision changes, SOB, RUQ pain, lower extremity swelling. -VB, -LOF, -CTXs, +FM    Prenatal course complicated by GDMA2 on metformin, low Mimi-A.    OBhx: none  Medhx: migraines  Surghx: Leep 2010, Eye surgery  Meds: Metformin 500 qhs, PNV. Was on ASA earlier in pregnancy  Allergies: meloxicam

## 2019-10-27 NOTE — OB PROVIDER TRIAGE NOTE - NSOBPROVIDERNOTE_OBGYN_ALL_OB_FT
Patient is a 34yo  at 38w6d here for elevated BP at home after syncopal episode  - BPs in triage 111/63 and 108/62  - Fingerstick 100  - Denies s/s of preeclampsia at this time, except for mild 3/10 HA Patient is a 32yo  at 38w6d here for elevated BP at home after syncopal episode  - BPs in triage 111/63 and 108/62  - Fingerstick 100  - Denies s/s of preeclampsia at this time, except for mild 3/10 HA  - Will get preeclampsia labs given low Mimi-A    D/w Dr. Sheridan Patient is a 32yo  at 38w6d here for elevated BP at home after syncopal episode  - BPs in triage 111/63 and 108/62  - Fingerstick 100  - Denies s/s of preeclampsia at this time, except for mild 3/10 HA  - Will get preeclampsia labs given low Mimi-A    D/w Dr. Sheridan    Addendum:  - Labs wnl -- P:C ratio of 0.1  - Urine with bacteria, leukocytes and leuk esterase but with epithelial cells, will not treat given patient is asymptomatic and sample appears to be contaminated  - Will send home, patient returning for IOL at 8pm tomorrow

## 2019-10-28 ENCOUNTER — NON-APPOINTMENT (OUTPATIENT)
Age: 34
End: 2019-10-28

## 2019-10-28 ENCOUNTER — APPOINTMENT (OUTPATIENT)
Dept: OBGYN | Facility: CLINIC | Age: 34
End: 2019-10-28
Payer: COMMERCIAL

## 2019-10-28 ENCOUNTER — INPATIENT (INPATIENT)
Facility: HOSPITAL | Age: 34
LOS: 4 days | Discharge: HOME | End: 2019-11-02
Attending: SPECIALIST | Admitting: SPECIALIST
Payer: COMMERCIAL

## 2019-10-28 VITALS
RESPIRATION RATE: 17 BRPM | SYSTOLIC BLOOD PRESSURE: 118 MMHG | HEART RATE: 87 BPM | DIASTOLIC BLOOD PRESSURE: 70 MMHG | TEMPERATURE: 98 F

## 2019-10-28 VITALS
DIASTOLIC BLOOD PRESSURE: 70 MMHG | HEIGHT: 61 IN | SYSTOLIC BLOOD PRESSURE: 116 MMHG | BODY MASS INDEX: 32.47 KG/M2 | WEIGHT: 172 LBS

## 2019-10-28 VITALS
SYSTOLIC BLOOD PRESSURE: 112 MMHG | HEART RATE: 76 BPM | TEMPERATURE: 98 F | RESPIRATION RATE: 17 BRPM | DIASTOLIC BLOOD PRESSURE: 57 MMHG

## 2019-10-28 DIAGNOSIS — Z3A.39 39 WEEKS GESTATION OF PREGNANCY: ICD-10-CM

## 2019-10-28 DIAGNOSIS — Z98.890 OTHER SPECIFIED POSTPROCEDURAL STATES: Chronic | ICD-10-CM

## 2019-10-28 DIAGNOSIS — O24.410 GESTATIONAL DIABETES MELLITUS IN PREGNANCY, DIET CONTROLLED: ICD-10-CM

## 2019-10-28 DIAGNOSIS — O26.893 OTHER SPECIFIED PREGNANCY RELATED CONDITIONS, THIRD TRIMESTER: ICD-10-CM

## 2019-10-28 LAB
ALBUMIN SERPL ELPH-MCNC: 3.4 G/DL — SIGNIFICANT CHANGE UP (ref 3.3–5.2)
ALP SERPL-CCNC: 184 U/L — HIGH (ref 40–120)
ALT FLD-CCNC: 7 U/L — SIGNIFICANT CHANGE UP
ANION GAP SERPL CALC-SCNC: 12 MMOL/L — SIGNIFICANT CHANGE UP (ref 5–17)
APPEARANCE UR: CLEAR — SIGNIFICANT CHANGE UP
APTT BLD: 26.7 SEC — LOW (ref 27.5–36.3)
AST SERPL-CCNC: 12 U/L — SIGNIFICANT CHANGE UP
BASOPHILS # BLD AUTO: 0.03 K/UL — SIGNIFICANT CHANGE UP (ref 0–0.2)
BASOPHILS NFR BLD AUTO: 0.4 % — SIGNIFICANT CHANGE UP (ref 0–2)
BILIRUB SERPL-MCNC: <0.2 MG/DL — LOW (ref 0.4–2)
BILIRUB UR QL STRIP: NORMAL
BILIRUB UR-MCNC: NEGATIVE — SIGNIFICANT CHANGE UP
BLD GP AB SCN SERPL QL: SIGNIFICANT CHANGE UP
BUN SERPL-MCNC: 10 MG/DL — SIGNIFICANT CHANGE UP (ref 8–20)
CALCIUM SERPL-MCNC: 8.9 MG/DL — SIGNIFICANT CHANGE UP (ref 8.6–10.2)
CHLORIDE SERPL-SCNC: 104 MMOL/L — SIGNIFICANT CHANGE UP (ref 98–107)
CO2 SERPL-SCNC: 19 MMOL/L — LOW (ref 22–29)
COLLECTION METHOD: NORMAL
COLOR SPEC: YELLOW — SIGNIFICANT CHANGE UP
CREAT SERPL-MCNC: 0.57 MG/DL — SIGNIFICANT CHANGE UP (ref 0.5–1.3)
DIFF PNL FLD: ABNORMAL
EOSINOPHIL # BLD AUTO: 0.09 K/UL — SIGNIFICANT CHANGE UP (ref 0–0.5)
EOSINOPHIL NFR BLD AUTO: 1.1 % — SIGNIFICANT CHANGE UP (ref 0–6)
EPI CELLS # UR: SIGNIFICANT CHANGE UP
FIBRINOGEN PPP-MCNC: 868 MG/DL — CRITICAL HIGH (ref 350–510)
GLUCOSE BLDC GLUCOMTR-MCNC: 128 MG/DL — HIGH (ref 70–99)
GLUCOSE SERPL-MCNC: 105 MG/DL — SIGNIFICANT CHANGE UP (ref 70–115)
GLUCOSE UR QL: NEGATIVE MG/DL — SIGNIFICANT CHANGE UP
GLUCOSE UR-MCNC: NORMAL
HCG UR QL: 0.2 EU/DL
HCT VFR BLD CALC: 30.7 % — LOW (ref 34.5–45)
HGB BLD-MCNC: 10 G/DL — LOW (ref 11.5–15.5)
HGB UR QL STRIP.AUTO: ABNORMAL
IMM GRANULOCYTES NFR BLD AUTO: 0.7 % — SIGNIFICANT CHANGE UP (ref 0–1.5)
INR BLD: 0.95 RATIO — SIGNIFICANT CHANGE UP (ref 0.88–1.16)
KETONES UR-MCNC: NEGATIVE — SIGNIFICANT CHANGE UP
KETONES UR-MCNC: NORMAL
LDH SERPL L TO P-CCNC: 118 U/L — SIGNIFICANT CHANGE UP (ref 98–192)
LEUKOCYTE ESTERASE UR QL STRIP: NORMAL
LEUKOCYTE ESTERASE UR-ACNC: NEGATIVE — SIGNIFICANT CHANGE UP
LYMPHOCYTES # BLD AUTO: 2.28 K/UL — SIGNIFICANT CHANGE UP (ref 1–3.3)
LYMPHOCYTES # BLD AUTO: 26.7 % — SIGNIFICANT CHANGE UP (ref 13–44)
MCHC RBC-ENTMCNC: 29.2 PG — SIGNIFICANT CHANGE UP (ref 27–34)
MCHC RBC-ENTMCNC: 32.6 GM/DL — SIGNIFICANT CHANGE UP (ref 32–36)
MCV RBC AUTO: 89.5 FL — SIGNIFICANT CHANGE UP (ref 80–100)
MONOCYTES # BLD AUTO: 0.57 K/UL — SIGNIFICANT CHANGE UP (ref 0–0.9)
MONOCYTES NFR BLD AUTO: 6.7 % — SIGNIFICANT CHANGE UP (ref 2–14)
NEUTROPHILS # BLD AUTO: 5.52 K/UL — SIGNIFICANT CHANGE UP (ref 1.8–7.4)
NEUTROPHILS NFR BLD AUTO: 64.4 % — SIGNIFICANT CHANGE UP (ref 43–77)
NITRITE UR QL STRIP: NORMAL
NITRITE UR-MCNC: NEGATIVE — SIGNIFICANT CHANGE UP
PH UR STRIP: 6.5
PH UR: 6 — SIGNIFICANT CHANGE UP (ref 5–8)
PLATELET # BLD AUTO: 334 K/UL — SIGNIFICANT CHANGE UP (ref 150–400)
POTASSIUM SERPL-MCNC: 3.9 MMOL/L — SIGNIFICANT CHANGE UP (ref 3.5–5.3)
POTASSIUM SERPL-SCNC: 3.9 MMOL/L — SIGNIFICANT CHANGE UP (ref 3.5–5.3)
PROT SERPL-MCNC: 6.3 G/DL — LOW (ref 6.6–8.7)
PROT UR STRIP-MCNC: NORMAL
PROT UR-MCNC: NEGATIVE MG/DL — SIGNIFICANT CHANGE UP
PROTHROM AB SERPL-ACNC: 10.9 SEC — SIGNIFICANT CHANGE UP (ref 10–12.9)
RBC # BLD: 3.43 M/UL — LOW (ref 3.8–5.2)
RBC # FLD: 14.3 % — SIGNIFICANT CHANGE UP (ref 10.3–14.5)
RBC CASTS # UR COMP ASSIST: ABNORMAL /HPF (ref 0–4)
SODIUM SERPL-SCNC: 135 MMOL/L — SIGNIFICANT CHANGE UP (ref 135–145)
SP GR SPEC: 1.01 — SIGNIFICANT CHANGE UP (ref 1.01–1.02)
SP GR UR STRIP: 1.02
URATE SERPL-MCNC: 4 MG/DL — SIGNIFICANT CHANGE UP (ref 2.4–5.7)
UROBILINOGEN FLD QL: NEGATIVE MG/DL — SIGNIFICANT CHANGE UP
WBC # BLD: 8.55 K/UL — SIGNIFICANT CHANGE UP (ref 3.8–10.5)
WBC # FLD AUTO: 8.55 K/UL — SIGNIFICANT CHANGE UP (ref 3.8–10.5)
WBC UR QL: NEGATIVE — SIGNIFICANT CHANGE UP

## 2019-10-28 PROCEDURE — 76816 OB US FOLLOW-UP PER FETUS: CPT | Mod: 59

## 2019-10-28 PROCEDURE — 76818 FETAL BIOPHYS PROFILE W/NST: CPT

## 2019-10-28 PROCEDURE — 0502F SUBSEQUENT PRENATAL CARE: CPT

## 2019-10-28 RX ORDER — DEXTROSE 50 % IN WATER 50 %
12.5 SYRINGE (ML) INTRAVENOUS ONCE
Refills: 0 | Status: DISCONTINUED | OUTPATIENT
Start: 2019-10-28 | End: 2019-10-30

## 2019-10-28 RX ORDER — DEXTROSE 50 % IN WATER 50 %
25 SYRINGE (ML) INTRAVENOUS ONCE
Refills: 0 | Status: DISCONTINUED | OUTPATIENT
Start: 2019-10-28 | End: 2019-10-30

## 2019-10-28 RX ORDER — CITRIC ACID/SODIUM CITRATE 300-500 MG
30 SOLUTION, ORAL ORAL ONCE
Refills: 0 | Status: COMPLETED | OUTPATIENT
Start: 2019-10-28 | End: 2019-10-29

## 2019-10-28 RX ORDER — INSULIN LISPRO 100/ML
VIAL (ML) SUBCUTANEOUS
Refills: 0 | Status: DISCONTINUED | OUTPATIENT
Start: 2019-10-28 | End: 2019-10-30

## 2019-10-28 RX ORDER — SODIUM CHLORIDE 9 MG/ML
1000 INJECTION, SOLUTION INTRAVENOUS
Refills: 0 | Status: DISCONTINUED | OUTPATIENT
Start: 2019-10-28 | End: 2019-10-30

## 2019-10-28 RX ORDER — GLUCAGON INJECTION, SOLUTION 0.5 MG/.1ML
1 INJECTION, SOLUTION SUBCUTANEOUS ONCE
Refills: 0 | Status: DISCONTINUED | OUTPATIENT
Start: 2019-10-28 | End: 2019-10-30

## 2019-10-28 RX ORDER — OXYTOCIN 10 UNIT/ML
333.33 VIAL (ML) INJECTION
Qty: 20 | Refills: 0 | Status: DISCONTINUED | OUTPATIENT
Start: 2019-10-28 | End: 2019-11-02

## 2019-10-28 RX ORDER — DEXTROSE 50 % IN WATER 50 %
15 SYRINGE (ML) INTRAVENOUS ONCE
Refills: 0 | Status: DISCONTINUED | OUTPATIENT
Start: 2019-10-28 | End: 2019-10-30

## 2019-10-28 RX ADMIN — SODIUM CHLORIDE 125 MILLILITER(S): 9 INJECTION, SOLUTION INTRAVENOUS at 21:56

## 2019-10-28 NOTE — OB PROVIDER H&P - PROBLEM SELECTOR PLAN 1
Admit to L&D for cytotec induction   EFW 3850g   FHT category 1   GBS negative   Routine admission labs

## 2019-10-28 NOTE — OB PROVIDER H&P - PROBLEM SELECTOR PLAN 2
Will perform Accu-Chek q 4 hours in latent labor and discuss with Dr. Sheridan plans for diabetic CLD.   Routine alternating fluids ordered.

## 2019-10-28 NOTE — OB PROVIDER H&P - HISTORY OF PRESENT ILLNESS
Patient is a 32yo  at 39 weeks gestation c/w LMP LMP: 2019 who presents to L&D for IOL for GMDA-2. JEWELL: 2019  Patient was evaluated yesterday for r/op preeclampsia (/90 at home, normotensive here), labs negative.     Prenatal course complicated by:  1. GDMA-2 (HBA1c on  – 5.6%) on metformin   2. Fetal pyelectasis   3. Low FILI-A (2.5%tile)  OBHx: Denies  PMH: Fibroids  PSH: THELMA (2010)  Meds: Folic acid, PNV, ASA-81, Metformin 500mg qhs  ALL: NKDA  GBS: Neg  HIV: NR  RPR: Neg  Rubella: Immune  HepB: NR  ABO: O+

## 2019-10-28 NOTE — OB RN PATIENT PROFILE - ALERT: PERTINENT HISTORY
BioPhysical Profile(s)/1st Trimester Sonogram/Fetal Non-Stress Test (NST)/Ultra Screen at 12 Weeks/20 Week Level II Sonogram

## 2019-10-28 NOTE — OB PROVIDER H&P - ALERT: PERTINENT HISTORY
20 Week Level II Sonogram/Ultra Screen at 12 Weeks/Fetal Non-Stress Test (NST)/1st Trimester Sonogram/BioPhysical Profile(s)

## 2019-10-28 NOTE — OB PROVIDER H&P - NSHPPHYSICALEXAM_GEN_ALL_CORE
Vital Signs Last 24 Hrs  T(C): 36.6 (28 Oct 2019 20:48), Max: 37.0 (27 Oct 2019 22:05)  T(F): 97.9 (28 Oct 2019 20:48), Max: 98.6 (27 Oct 2019 22:05)  HR: 87 (28 Oct 2019 20:48) (76 - 89)  BP: 118/70 (28 Oct 2019 20:48) (99/52 - 118/70)  RR: 17 (28 Oct 2019 20:48) (17 - 17)    Abdomen: soft, nontender   SVE: 0/0/-3 at office today     EFW 3895g

## 2019-10-29 LAB
GLUCOSE BLDC GLUCOMTR-MCNC: 100 MG/DL — HIGH (ref 70–99)
GLUCOSE BLDC GLUCOMTR-MCNC: 135 MG/DL — HIGH (ref 70–99)
GLUCOSE BLDC GLUCOMTR-MCNC: 74 MG/DL — SIGNIFICANT CHANGE UP (ref 70–99)
GLUCOSE BLDC GLUCOMTR-MCNC: 77 MG/DL — SIGNIFICANT CHANGE UP (ref 70–99)
GLUCOSE BLDC GLUCOMTR-MCNC: 78 MG/DL — SIGNIFICANT CHANGE UP (ref 70–99)
GLUCOSE BLDC GLUCOMTR-MCNC: 86 MG/DL — SIGNIFICANT CHANGE UP (ref 70–99)

## 2019-10-29 RX ORDER — OXYTOCIN 10 UNIT/ML
2 VIAL (ML) INJECTION
Qty: 30 | Refills: 0 | Status: DISCONTINUED | OUTPATIENT
Start: 2019-10-29 | End: 2019-10-29

## 2019-10-29 RX ORDER — SODIUM CHLORIDE 9 MG/ML
500 INJECTION INTRAMUSCULAR; INTRAVENOUS; SUBCUTANEOUS ONCE
Refills: 0 | Status: COMPLETED | OUTPATIENT
Start: 2019-10-29 | End: 2019-10-29

## 2019-10-29 RX ORDER — OXYTOCIN 10 UNIT/ML
2 VIAL (ML) INJECTION
Qty: 30 | Refills: 0 | Status: DISCONTINUED | OUTPATIENT
Start: 2019-10-29 | End: 2019-10-30

## 2019-10-29 RX ORDER — SODIUM CHLORIDE 9 MG/ML
1000 INJECTION, SOLUTION INTRAVENOUS ONCE
Refills: 0 | Status: COMPLETED | OUTPATIENT
Start: 2019-10-29 | End: 2019-10-29

## 2019-10-29 RX ADMIN — SODIUM CHLORIDE 125 MILLILITER(S): 9 INJECTION, SOLUTION INTRAVENOUS at 19:44

## 2019-10-29 RX ADMIN — SODIUM CHLORIDE 125 MILLILITER(S): 9 INJECTION, SOLUTION INTRAVENOUS at 05:00

## 2019-10-29 RX ADMIN — Medication 30 MILLILITER(S): at 18:15

## 2019-10-29 RX ADMIN — Medication 2 MILLIUNIT(S)/MIN: at 19:44

## 2019-10-29 RX ADMIN — SODIUM CHLORIDE 125 MILLILITER(S): 9 INJECTION, SOLUTION INTRAVENOUS at 17:34

## 2019-10-29 RX ADMIN — SODIUM CHLORIDE 500 MILLILITER(S): 9 INJECTION INTRAMUSCULAR; INTRAVENOUS; SUBCUTANEOUS at 23:37

## 2019-10-29 RX ADMIN — SODIUM CHLORIDE 1000 MILLILITER(S): 9 INJECTION, SOLUTION INTRAVENOUS at 17:34

## 2019-10-29 RX ADMIN — SODIUM CHLORIDE 125 MILLILITER(S): 9 INJECTION, SOLUTION INTRAVENOUS at 20:08

## 2019-10-29 RX ADMIN — Medication 2 MILLIUNIT(S)/MIN: at 21:55

## 2019-10-29 NOTE — CHART NOTE - NSCHARTNOTEFT_GEN_A_CORE
Patient is resting comfortably in bed. She recently had an epidural placed.    FHT: Category 1  [place]: Ctx q4 minutes (inadequate)    SVE: Deferred. SVE at 16:48: 2/30/-3. AROM at 16:48- clear.    Plan:  -Will start Pitocin due to inadequate contractions. IUPC in place.  -GDMA2- FS glucose checks q 4 hours.  -CEFM + [place]  -IVF hydration  -Will continue to monitor.    All of the patient's questions were answered.

## 2019-10-29 NOTE — CHART NOTE - NSCHARTNOTEFT_GEN_A_CORE
Patient reports contractions are getting stronger, however she does not desire anything for pain management at this time.    Vital Signs Last 24 Hrs  T(C): 36.8 (29 Oct 2019 11:34), Max: 36.8 (28 Oct 2019 23:50)  T(F): 98.24 (29 Oct 2019 11:34), Max: 98.24 (28 Oct 2019 23:50)  HR: 67 (29 Oct 2019 11:34) (63 - 87)  BP: 113/68 (29 Oct 2019 11:34) (96/52 - 118/70)  RR: 18 (29 Oct 2019 11:34) (17 - 18)    FHT: baseline 135, moderate variability, + accels, - decels  [place]: ctx q2-4min    -Patient has received her 3rd dose of 60mcg PO cytotec at 1300, next dose at 1500  -FHT cat 1  -SVE deferred  -Continue current management

## 2019-10-29 NOTE — CHART NOTE - NSCHARTNOTEFT_GEN_A_CORE
Provider at bedside due to recurrent late decelerations after epidural top off. She reports that her pain resolved after her epidural top off. FHT with moderate variability and accelerations.    -Pitocin discontinued.  -Resuscitative measures initiated- Lateral positioning, IVF bolus, and oxygen mask. Decelerations resolved after resuscitative measures were started.  -Epidural in place.  -Amnioinfusion ordered.  -Will continue to monitor.    Plan of care was discussed with the patient and her family.

## 2019-10-29 NOTE — CHART NOTE - NSCHARTNOTEFT_GEN_A_CORE
Patient recently returned from ambulating around the unit. She reports having moderate pain with contractions, however declines any pain management at this time.    FHT: Category 1  [place]: Ctx q 2 minutes    SVE: 1/long/posterior    Plan:  -Continue Cytotec titration regimen. s/p Cytotec 20 mcg x 3, 40 mcg x 2, and 60 mcg x 1. She is due for her second dose of Cytotec 60 mcg now.  -GDMA2- Continue FS glucose checks q 4 hours.  -CEFM + [place]  -Clear diabetic diet  -Will continue to monitor.

## 2019-10-29 NOTE — CHART NOTE - NSCHARTNOTEFT_GEN_A_CORE
S: Patient doing well, no complaints at this time. Resting   O:   Vital Signs Last 24 Hrs  T(C): 36.8 (28 Oct 2019 23:50), Max: 36.8 (28 Oct 2019 23:50)  T(F): 98.24 (28 Oct 2019 23:50), Max: 98.24 (28 Oct 2019 23:50)  HR: 72 (28 Oct 2019 23:50) (72 - 87)  BP: 107/59 (28 Oct 2019 23:50) (107/59 - 118/70)  RR: 18 (28 Oct 2019 23:50) (17 - 18)    CV: RRR   Lungs: CTA     FHT: baseline 125 moderate variability, accelerations present, no decelerations   [place]: ctx irregular q2-7 minutes     A/P   Will continue course of cytotec, next dose of 40mcg due at 4am.   FHT category 1.     Will discuss with attending

## 2019-10-29 NOTE — CHART NOTE - NSCHARTNOTEFT_GEN_A_CORE
Patient is resting comfortably in bed. She reports having mild pain with contractions. She declines any pain management at this time.    FHT: Category 1  [place]: Ctx q 2-4 minutes    SVE: Deferred    Plan:  -Continue Cytotec titration regimen. s/p Cytotec 20 mcg x 3 and 40 mcg x 2. She is due for her first Cytotec 60 mcg now.  -GDMA2- Continue FS glucose checks q 4 hours.  -CEFM + [place]  -Clear diabetic diet  -Patient may intermittently ambulate when she desires.  -Will continue to monitor.

## 2019-10-29 NOTE — CHART NOTE - NSCHARTNOTEFT_GEN_A_CORE
Patient is reporting 7-8/10 pain with her contractions. Currently has an epidural in place.    She previously had her Pitocin discontinued at 20:55 due to late decelerations, however fetal heart tracing improved after resuscitative measures were initiated and Pitocin was later re-started when FHT was Category 1.     FHT: Category 1  [place]: Ctx q 2-4 minutes    SVE: 3/90/-2    Plan:  -Continue Pitocin. IUPC in place.  -Anesthesia consulted for epidural top off.  -GDMA2- FS glucose checks q 4 hours.  -CEFM + [place]  -Will continue to monitor.

## 2019-10-29 NOTE — CHART NOTE - NSCHARTNOTEFT_GEN_A_CORE
Patient reports having increased pelvic pressure and pain with her contractions. Declines pain medicine at this time.    She was examined at 16:18: 1.5/30/-3. We discussed placing an intracervical umana for mechanical dilation. Procedure details were discussed and patient agrees. A Cook catheter was placed in the cervix with approximately 60 cc of normal saline. Patient had severe pain with inflation of the umana catheter, thus the umana was removed and patient was now noted to be 2/30/-3.    FHT: Category 1  [place]: Ctx q2-3 minutes     Plan:  -Discontinue Cytotec titration regimen.  -AROM done at bedside- clear.  -Expectant management since she is having regular contractions. Will consider Pitocin if needed.  -GDMA2- FS glucose checks q 4 hours.  -CEFM + [place]  -IVF hydration  -Will continue to monitor.    All of the patient's questions were answered.

## 2019-10-30 ENCOUNTER — RESULT REVIEW (OUTPATIENT)
Age: 34
End: 2019-10-30

## 2019-10-30 ENCOUNTER — TRANSCRIPTION ENCOUNTER (OUTPATIENT)
Age: 34
End: 2019-10-30

## 2019-10-30 LAB
GLUCOSE BLDC GLUCOMTR-MCNC: 74 MG/DL — SIGNIFICANT CHANGE UP (ref 70–99)
MEV IGG SER-ACNC: 11.8 AU/ML — SIGNIFICANT CHANGE UP
MEV IGG+IGM SER-IMP: NEGATIVE — SIGNIFICANT CHANGE UP
T PALLIDUM AB TITR SER: NEGATIVE — SIGNIFICANT CHANGE UP

## 2019-10-30 PROCEDURE — 88307 TISSUE EXAM BY PATHOLOGIST: CPT | Mod: 26

## 2019-10-30 PROCEDURE — 59510 CESAREAN DELIVERY: CPT

## 2019-10-30 RX ORDER — GENTAMICIN SULFATE 40 MG/ML
120 VIAL (ML) INJECTION EVERY 8 HOURS
Refills: 0 | Status: COMPLETED | OUTPATIENT
Start: 2019-10-30 | End: 2019-11-01

## 2019-10-30 RX ORDER — DEXAMETHASONE 0.5 MG/5ML
4 ELIXIR ORAL EVERY 6 HOURS
Refills: 0 | Status: DISCONTINUED | OUTPATIENT
Start: 2019-10-30 | End: 2019-11-02

## 2019-10-30 RX ORDER — IBUPROFEN 200 MG
600 TABLET ORAL EVERY 6 HOURS
Refills: 0 | Status: COMPLETED | OUTPATIENT
Start: 2019-10-30 | End: 2020-09-27

## 2019-10-30 RX ORDER — ACETAMINOPHEN 500 MG
975 TABLET ORAL
Refills: 0 | Status: DISCONTINUED | OUTPATIENT
Start: 2019-10-30 | End: 2019-11-02

## 2019-10-30 RX ORDER — MAGNESIUM HYDROXIDE 400 MG/1
30 TABLET, CHEWABLE ORAL
Refills: 0 | Status: DISCONTINUED | OUTPATIENT
Start: 2019-10-30 | End: 2019-11-02

## 2019-10-30 RX ORDER — GENTAMICIN SULFATE 40 MG/ML
VIAL (ML) INJECTION
Refills: 0 | Status: DISCONTINUED | OUTPATIENT
Start: 2019-10-30 | End: 2019-10-30

## 2019-10-30 RX ORDER — AMPICILLIN TRIHYDRATE 250 MG
2 CAPSULE ORAL ONCE
Refills: 0 | Status: COMPLETED | OUTPATIENT
Start: 2019-10-30 | End: 2019-10-30

## 2019-10-30 RX ORDER — DIPHENHYDRAMINE HCL 50 MG
25 CAPSULE ORAL EVERY 6 HOURS
Refills: 0 | Status: DISCONTINUED | OUTPATIENT
Start: 2019-10-30 | End: 2019-11-02

## 2019-10-30 RX ORDER — AMPICILLIN TRIHYDRATE 250 MG
2 CAPSULE ORAL EVERY 6 HOURS
Refills: 0 | Status: DISCONTINUED | OUTPATIENT
Start: 2019-10-30 | End: 2019-10-30

## 2019-10-30 RX ORDER — SODIUM CHLORIDE 9 MG/ML
1000 INJECTION, SOLUTION INTRAVENOUS
Refills: 0 | Status: DISCONTINUED | OUTPATIENT
Start: 2019-10-30 | End: 2019-11-02

## 2019-10-30 RX ORDER — GENTAMICIN SULFATE 40 MG/ML
160 VIAL (ML) INJECTION ONCE
Refills: 0 | Status: COMPLETED | OUTPATIENT
Start: 2019-10-30 | End: 2019-10-30

## 2019-10-30 RX ORDER — AMPICILLIN TRIHYDRATE 250 MG
CAPSULE ORAL
Refills: 0 | Status: DISCONTINUED | OUTPATIENT
Start: 2019-10-30 | End: 2019-10-30

## 2019-10-30 RX ORDER — AMPICILLIN TRIHYDRATE 250 MG
2 CAPSULE ORAL EVERY 6 HOURS
Refills: 0 | Status: COMPLETED | OUTPATIENT
Start: 2019-10-30 | End: 2019-11-01

## 2019-10-30 RX ORDER — OXYCODONE HYDROCHLORIDE 5 MG/1
5 TABLET ORAL ONCE
Refills: 0 | Status: DISCONTINUED | OUTPATIENT
Start: 2019-10-30 | End: 2019-11-02

## 2019-10-30 RX ORDER — GENTAMICIN SULFATE 40 MG/ML
120 VIAL (ML) INJECTION EVERY 8 HOURS
Refills: 0 | Status: DISCONTINUED | OUTPATIENT
Start: 2019-10-30 | End: 2019-10-30

## 2019-10-30 RX ORDER — NALBUPHINE HYDROCHLORIDE 10 MG/ML
2.5 INJECTION, SOLUTION INTRAMUSCULAR; INTRAVENOUS; SUBCUTANEOUS EVERY 6 HOURS
Refills: 0 | Status: DISCONTINUED | OUTPATIENT
Start: 2019-10-30 | End: 2019-11-02

## 2019-10-30 RX ORDER — AZITHROMYCIN 500 MG/1
500 TABLET, FILM COATED ORAL ONCE
Refills: 0 | Status: DISCONTINUED | OUTPATIENT
Start: 2019-10-30 | End: 2019-10-30

## 2019-10-30 RX ORDER — OXYCODONE HYDROCHLORIDE 5 MG/1
5 TABLET ORAL
Refills: 0 | Status: DISCONTINUED | OUTPATIENT
Start: 2019-10-30 | End: 2019-11-02

## 2019-10-30 RX ORDER — TRANEXAMIC ACID 100 MG/ML
1000 INJECTION, SOLUTION INTRAVENOUS ONCE
Refills: 0 | Status: COMPLETED | OUTPATIENT
Start: 2019-10-30 | End: 2019-10-30

## 2019-10-30 RX ORDER — GLYCERIN ADULT
1 SUPPOSITORY, RECTAL RECTAL AT BEDTIME
Refills: 0 | Status: DISCONTINUED | OUTPATIENT
Start: 2019-10-30 | End: 2019-11-02

## 2019-10-30 RX ORDER — OXYTOCIN 10 UNIT/ML
333.33 VIAL (ML) INJECTION
Qty: 20 | Refills: 0 | Status: DISCONTINUED | OUTPATIENT
Start: 2019-10-30 | End: 2019-11-02

## 2019-10-30 RX ORDER — DIPHENHYDRAMINE HCL 50 MG
25 CAPSULE ORAL EVERY 4 HOURS
Refills: 0 | Status: DISCONTINUED | OUTPATIENT
Start: 2019-10-30 | End: 2019-11-02

## 2019-10-30 RX ORDER — KETOROLAC TROMETHAMINE 30 MG/ML
30 SYRINGE (ML) INJECTION ONCE
Refills: 0 | Status: DISCONTINUED | OUTPATIENT
Start: 2019-10-30 | End: 2019-10-30

## 2019-10-30 RX ORDER — ONDANSETRON 8 MG/1
4 TABLET, FILM COATED ORAL EVERY 6 HOURS
Refills: 0 | Status: DISCONTINUED | OUTPATIENT
Start: 2019-10-30 | End: 2019-11-02

## 2019-10-30 RX ORDER — SIMETHICONE 80 MG/1
80 TABLET, CHEWABLE ORAL EVERY 4 HOURS
Refills: 0 | Status: DISCONTINUED | OUTPATIENT
Start: 2019-10-30 | End: 2019-11-02

## 2019-10-30 RX ORDER — NALOXONE HYDROCHLORIDE 4 MG/.1ML
0.1 SPRAY NASAL
Refills: 0 | Status: DISCONTINUED | OUTPATIENT
Start: 2019-10-30 | End: 2019-11-02

## 2019-10-30 RX ORDER — TETANUS TOXOID, REDUCED DIPHTHERIA TOXOID AND ACELLULAR PERTUSSIS VACCINE, ADSORBED 5; 2.5; 8; 8; 2.5 [IU]/.5ML; [IU]/.5ML; UG/.5ML; UG/.5ML; UG/.5ML
0.5 SUSPENSION INTRAMUSCULAR ONCE
Refills: 0 | Status: DISCONTINUED | OUTPATIENT
Start: 2019-10-30 | End: 2019-11-02

## 2019-10-30 RX ORDER — HEPARIN SODIUM 5000 [USP'U]/ML
5000 INJECTION INTRAVENOUS; SUBCUTANEOUS EVERY 12 HOURS
Refills: 0 | Status: DISCONTINUED | OUTPATIENT
Start: 2019-10-30 | End: 2019-11-02

## 2019-10-30 RX ORDER — ACETAMINOPHEN 500 MG
1000 TABLET ORAL ONCE
Refills: 0 | Status: COMPLETED | OUTPATIENT
Start: 2019-10-30 | End: 2019-10-30

## 2019-10-30 RX ORDER — LANOLIN
1 OINTMENT (GRAM) TOPICAL EVERY 6 HOURS
Refills: 0 | Status: DISCONTINUED | OUTPATIENT
Start: 2019-10-30 | End: 2019-11-02

## 2019-10-30 RX ORDER — SACCHAROMYCES BOULARDII 250 MG
250 POWDER IN PACKET (EA) ORAL
Refills: 0 | Status: DISCONTINUED | OUTPATIENT
Start: 2019-10-30 | End: 2019-11-02

## 2019-10-30 RX ORDER — KETOROLAC TROMETHAMINE 30 MG/ML
30 SYRINGE (ML) INJECTION EVERY 6 HOURS
Refills: 0 | Status: DISCONTINUED | OUTPATIENT
Start: 2019-10-30 | End: 2019-10-31

## 2019-10-30 RX ADMIN — Medication 216 GRAM(S): at 02:48

## 2019-10-30 RX ADMIN — Medication 975 MILLIGRAM(S): at 21:22

## 2019-10-30 RX ADMIN — HEPARIN SODIUM 5000 UNIT(S): 5000 INJECTION INTRAVENOUS; SUBCUTANEOUS at 18:15

## 2019-10-30 RX ADMIN — Medication 975 MILLIGRAM(S): at 22:07

## 2019-10-30 RX ADMIN — Medication 208 MILLIGRAM(S): at 03:40

## 2019-10-30 RX ADMIN — Medication 975 MILLIGRAM(S): at 15:30

## 2019-10-30 RX ADMIN — Medication 400 MILLIGRAM(S): at 02:48

## 2019-10-30 RX ADMIN — Medication 216 GRAM(S): at 12:28

## 2019-10-30 RX ADMIN — Medication 2 MILLIUNIT(S)/MIN: at 00:06

## 2019-10-30 RX ADMIN — Medication 100 MILLIGRAM(S): at 13:19

## 2019-10-30 RX ADMIN — Medication 30 MILLIGRAM(S): at 18:15

## 2019-10-30 RX ADMIN — Medication 216 GRAM(S): at 18:15

## 2019-10-30 RX ADMIN — Medication 975 MILLIGRAM(S): at 16:15

## 2019-10-30 RX ADMIN — Medication 100 MILLIGRAM(S): at 22:50

## 2019-10-30 RX ADMIN — Medication 30 MILLIGRAM(S): at 10:42

## 2019-10-30 RX ADMIN — AZITHROMYCIN 255 MILLIGRAM(S): 500 TABLET, FILM COATED ORAL at 06:10

## 2019-10-30 RX ADMIN — Medication 100 MILLIGRAM(S): at 21:21

## 2019-10-30 RX ADMIN — Medication 30 MILLIGRAM(S): at 18:30

## 2019-10-30 RX ADMIN — SODIUM CHLORIDE 125 MILLILITER(S): 9 INJECTION, SOLUTION INTRAVENOUS at 15:54

## 2019-10-30 RX ADMIN — Medication 30 MILLIGRAM(S): at 10:27

## 2019-10-30 RX ADMIN — Medication 1000 MILLIGRAM(S): at 03:20

## 2019-10-30 RX ADMIN — TRANEXAMIC ACID 220 MILLIGRAM(S): 100 INJECTION, SOLUTION INTRAVENOUS at 05:43

## 2019-10-30 NOTE — OB RN DELIVERY SUMMARY - AS DELIV COMPLICATIONS OB
chorioamnionitis maternal fever/prolonged rupture of membranes prolonged rupture of membranes/chorioamnionitis/maternal fever

## 2019-10-30 NOTE — CHART NOTE - NSCHARTNOTEFT_GEN_A_CORE
Gloria is pushing with her contractions and she is FD/+1 with progressive molding. She is having a bloody show and the uterus is soft and nontender between contractions. She has a 8 cm myoma in the right lower aspect of the uterus. FHR is stable and improved with good response to scalp stimulation.

## 2019-10-30 NOTE — OB RN DELIVERY SUMMARY - NS_SEPSISRSKCALC_OBGYN_ALL_OB_FT
No temperature has been documented for this patient in CPN or on the OB Flowsheet. Ensure the highest temperature during labor was documented on the OB Flowsheet.  No gestational age at birth has been documented. Ensure delivery date/time has been entered above.  Rupture of membranes must be entered above. EOS calculated successfully. EOS Risk Factor: 0.5/1000 live births ([facility] national incidence); GA=39w2d; Temp=101.7; ROM=13.7; GBS='Negative'; Antibiotics='GBS specific antibiotics > 2 hrs prior to birth'

## 2019-10-30 NOTE — CHART NOTE - NSCHARTNOTEFT_GEN_A_CORE
Patient's FHT is Category 1 for the last 30 minutes after resuscitative measures and amnioinfusion. Will re-start Pitocin at this time. Plan of care was discussed with the patient.

## 2019-10-30 NOTE — OB PROVIDER DELIVERY SUMMARY - NSPROVIDERDELIVERYNOTE_OBGYN_ALL_OB_FT
Primary LST  section, deep occiput transverse arrest, male baby , apgars 9/9, weight 8#9oz. Delayed cord clamping 30 seconds, NICU present. Placenta to path, adhesions posterior to the uterus (ovarian) endometriosis obliterated posterior culdesac, 8 cm anterior fundal myoma.

## 2019-10-30 NOTE — OB NEONATOLOGY/PEDIATRICIAN DELIVERY SUMMARY - NSPEDSNEONOTESA_OBGYN_ALL_OB_FT
Baby Oj Thakkar is a 39.2 wk term  born via C/S on 10/30/[birth year] to 33 yo  O+/GBS neg/ RPR NR/ HIV neg/ HepBSAg neg/ RI mom with EDC 19.  Mom had good prenatal care.  On Metformin for GDM A2.  No other significant complications.  L&D: AROM at 1648 on 10/29; 14 h PTD. Maternal fever of 38.7 C/ 101.8 F in labor with fetal tachycardia,  Received Ampicillin/ Gentamicin x 1 ~ 4 h PTD.  C/S for FTP.  Upon delivery clelivery clear AF; vertex.  Spontaneous cry.  Routine care  AS .  A/P: Ter 3870g AGA  delivered by C/S. IDM. Maternal fever/ fetal tachycardia   Transfer to [facility] for sepsis evaluation.  Observe for respiratory distress.  Glucose monitoring as per protocol.  BW 3870g  Lincoln Reynoso MD Baby Oj Peck is a 39.2 wk term  born via C/S on 10/30/19 to 32 yo  O+/GBS neg/ RPR NR/ HIV neg/ HepBSAg neg/ RI mom with EDC 19.  Mom had good prenatal care.  On Metformin for GDM A2.  No other significant complications.  L&D: AROM at 1648 on 10/29; 14 h PTD. Maternal fever of 38.7 C/ 101.8 F in labor with fetal tachycardia,  Received Ampicillin/ Gentamicin x 1 ~ 4 h PTD.  C/S for FTP.  Upon delivery clelivery clear AF; vertex.  Spontaneous cry.  Routine care  AS .  A/P: Ter 3870g AGA  delivered by C/S. IDM. Maternal fever/ fetal tachycardia. EOS at birth 0.9  Transfer to [facility] for sepsis evaluation.  Observe for respiratory distress.  Glucose monitoring as per protocol.  BW 3870g  Lincoln Reynoso MD

## 2019-10-30 NOTE — CHART NOTE - NSCHARTNOTEFT_GEN_A_CORE
Gloria is doing better after an adjustment of her epidural. She has increased rectal pressure and on VE she is 9cm/100%/-1 with clear AF, IUPC in place, FSE placed to better monitor the baby and she will be more readily repositioned. Support was given. She is accompanied by her  and Mother.  FHR is stable with occasional mild variable decles,

## 2019-10-30 NOTE — DISCHARGE NOTE OB - HOSPITAL COURSE
Pt is 34yo  s/p  section. She was transferred to postpartum unit without complications during her stay. Upon discharge she is voiding, tolerating PO, ambulating, and pain is well controlled.

## 2019-10-30 NOTE — CHART NOTE - NSCHARTNOTEFT_GEN_A_CORE
Gloria is doing well except for a c/o low pelvic pressure despite the epidural. On VE she is 6cm/90%/-1 station with clear AF. An IUPC is in place. She has a reactive and stable category 1 FHR tracing with a baseline of 135 and moderate to marked variability. Prior variable decels have resolved with an amnioinfusion. We discussed her excellent progress and Anesthesia was asked to top off the epidural and she gained relief form this. All questions were answered.

## 2019-10-30 NOTE — OB RN DELIVERY SUMMARY - NS_LABORCHARACTER_OBGYN_ALL_OB
Febrile (>38C)/Internal electronic FM/Induction of labor-AROM/Augmentation of labor/External electronic FM/Induction of labor-Medicinal/Antibiotics in labor

## 2019-10-30 NOTE — DISCHARGE NOTE OB - MEDICATION SUMMARY - MEDICATIONS TO STOP TAKING
I will STOP taking the medications listed below when I get home from the hospital:    meclizine 25 mg oral tablet  -- 1 tab(s) by mouth 3 times a day, As Needed - for dizziness  -- May cause drowsiness.  Alcohol may intensify this effect.  Use care when operating dangerous machinery.

## 2019-10-30 NOTE — CHART NOTE - NSCHARTNOTEFT_GEN_A_CORE
S: Patient is doing well. She reports having some nausea/vomiting earlier today, however has been able to tolerate small amounts of food without any vomiting this evening. Denies any nausea. +Mild abdominal pain controlled with pain meds. +Salazar in place with adequate urine output. No flatus or bowel movement. Mild lochia. No other complaints.    O:  Vital Signs Last 24 Hrs  T(C): 36.9 (30 Oct 2019 20:50), Max: 38.7 (30 Oct 2019 02:40)  T(F): 98.4 (30 Oct 2019 20:50), Max: 101.7 (30 Oct 2019 02:44)  HR: 79 (30 Oct 2019 20:50) (53 - 221)  BP: 95/54 (30 Oct 2019 20:50) (90/49 - 168/94)  BP(mean): 96 (30 Oct 2019 07:38) (96 - 96)  RR: 18 (30 Oct 2019 20:50) (18 - 22)  SpO2: 95% (30 Oct 2019 20:50) (72% - 100%)  Gen: NAD. A&Ox3.  CV: RRR  Lungs: No respiratory distress.  Abd: Soft, non-distended, appropriately tender. No rebound tenderness. No guarding. Incisional dressing is clean and intact.   Ext: No calf tenderness.    Labs: CBC:                        10.0   8.55  )-----------( 334      ( 28 Oct 2019 21:36 )             30.7    A/P: POD#0 s/p C/S. Patient is doing well.    [ ] Chorioamnionitis- Continue Ampicillin/Gentamicin/Clindamycin x 48 hours. She was also started on Florastor probiotics.  [ ] Salazar in situ with adequate urine output. Urine appears concentrated. Patient was encouraged to increase oral hydration.  [ ] Pain control PRN  [ ] Heparin and SCDs for DVT prophylaxis.   [ ] Encourage ambulation and incentive spirometry use.  [ ] Routine postpartum and post-operative care. S: Patient is doing well. She reports having some nausea/vomiting earlier today, however has been able to tolerate small amounts of food without any vomiting this evening. Denies any nausea. +Mild abdominal pain controlled with pain meds. +Umana in place with adequate urine output. No flatus or bowel movement. Mild lochia. No other complaints.    O:  Vital Signs Last 24 Hrs  T(C): 36.9 (30 Oct 2019 20:50), Max: 38.7 (30 Oct 2019 02:40)  T(F): 98.4 (30 Oct 2019 20:50), Max: 101.7 (30 Oct 2019 02:44)  HR: 79 (30 Oct 2019 20:50) (53 - 221)  BP: 95/54 (30 Oct 2019 20:50) (90/49 - 168/94)  BP(mean): 96 (30 Oct 2019 07:38) (96 - 96)  RR: 18 (30 Oct 2019 20:50) (18 - 22)  SpO2: 95% (30 Oct 2019 20:50) (72% - 100%)  Gen: NAD. A&Ox3.  CV: RRR  Lungs: No respiratory distress.  Abd: Soft, non-distended, appropriately tender. No rebound tenderness. No guarding. Incisional dressing is clean and intact.   Ext: No calf tenderness.    Labs: CBC:                        10.0   8.55  )-----------( 334      ( 28 Oct 2019 21:36 )             30.7    A/P: POD#0 s/p C/S. Patient is doing well.    [ ] Chorioamnionitis- Continue Ampicillin/Gentamicin/Clindamycin x 48 hours. She was also started on Florastor probiotics.  [ ] Umana in situ with adequate urine output. Urine appears concentrated. D/C umana. Follow up TOV. Patient was encouraged to increase oral hydration.  [ ] Pain control PRN  [ ] Heparin and SCDs for DVT prophylaxis.   [ ] Encourage ambulation and incentive spirometry use.  [ ] Routine postpartum and post-operative care.

## 2019-10-30 NOTE — CHART NOTE - NSCHARTNOTEFT_GEN_A_CORE
Patient seen at bedside with incr. pain  Tracing has been category I   She was checked by Dr. Jackson and found to be 6/90/-1    Vital Signs Last 24 Hrs  T(C): 37.1 (30 Oct 2019 00:01), Max: 37.2 (29 Oct 2019 22:00)  T(F): 98.78 (30 Oct 2019 00:01), Max: 98.96 (29 Oct 2019 22:00)  HR: 78 (30 Oct 2019 00:46) (56 - 87)  BP: 115/56 (30 Oct 2019 00:37) (91/53 - 135/70)  RR: 17 (29 Oct 2019 18:56) (17 - 18)  SpO2: 99% (30 Oct 2019 00:46) (92% - 100%)    FHT: 140bpm - category I tracing  [place]: Ctxs every 2 minutes    Pitocin at 4.   Amnioinfusion in place  Will continue to monitor and reassess need for resuscitation

## 2019-10-30 NOTE — CHART NOTE - NSCHARTNOTEFT_GEN_A_CORE
Gloria is pushing with her contractions. she is fully dilated and at +1 station with marked caput and molding. She has clear AF, her FHR tracing is baseline 140 with variable decels and occasional mild late decelerations, good recovery to baseline and good response to scalp stimulation. We will continue to push and plan for a vaginal delivery.

## 2019-10-30 NOTE — CHART NOTE - NSCHARTNOTEFT_GEN_A_CORE
Gloria has a strong urge to push and rectal pressure. On exam she is fully dilated and at +1 with some mild caput. She has a stable FHR tracing and contractions q 2-3 minutes. She is receiving Ampicillin and Gentamicin due to febrile morbidity/Chorioamnionitis. We discussed the issues and expectations. She will begin formal pushing.

## 2019-10-30 NOTE — DISCHARGE NOTE OB - PATIENT PORTAL LINK FT
You can access the FollowMyHealth Patient Portal offered by [facility] by registering at the following website: [email or web address]. By joining [employer]’s FollowMyHealth portal, you will also be able to view your health information using other applications (apps) compatible with our system.

## 2019-10-30 NOTE — CHART NOTE - NSCHARTNOTEFT_GEN_A_CORE
Patient evaluated at bedside--feeling increased pain and pressure  She was checked and found to be 9/100/-1.  Rectal temp at that time was taken and found to be 38.7c    Vital Signs Last 24 Hrs  T(C): 38.7 (30 Oct 2019 02:40), Max: 38.7 (30 Oct 2019 02:40)  T(F): 101.66 (30 Oct 2019 02:40), Max: 101.66 (30 Oct 2019 02:40)  HR: 107 (30 Oct 2019 02:56) (56 - 123)  BP: 117/57 (30 Oct 2019 02:56) (91/53 - 168/94)  RR: 17 (29 Oct 2019 18:56) (17 - 18)  SpO2: 100% (30 Oct 2019 02:56) (84% - 100%)    FHT: 160bpm with minimal variability, no accels, intermittent variable decelerations  [place]: Ctxs every 2-3min    Will start ampicillin 2g IV q6h and gentamicin 2mg/kg iv load followed by 1.5mg/kg q8h  for presumed chorioamnionitis    D/w Dr. Jackson

## 2019-10-30 NOTE — DISCHARGE NOTE OB - CARE PROVIDER_API CALL
Marielena Sheridan)  Obstetrics and Gynecology  [facility] [place] [address]  Phone: [phone]  Fax: [phone]  Follow Up Time:

## 2019-10-30 NOTE — DISCHARGE NOTE OB - MEDICATION SUMMARY - MEDICATIONS TO TAKE
I will START or STAY ON the medications listed below when I get home from the hospital:    ibuprofen 600 mg oral tablet  -- 1 tab(s) by mouth every 6 hours  -- Indication: For  delivery delivered    Oxaydo 5 mg oral tablet  -- 1 tab(s) by mouth every 6 hours, As Needed -for severe pain MDD:4  -- Caution federal law prohibits the transfer of this drug to any person other  than the person for whom it was prescribed.  It is very important that you take or use this exactly as directed.  Do not skip doses or discontinue unless directed by your doctor.  May cause drowsiness.  Alcohol may intensify this effect.  Use care when operating dangerous machinery.  This prescription cannot be refilled.  Using more of this medication than prescribed may cause serious breathing problems.    -- Indication: For  delivery delivered

## 2019-10-31 LAB
BASOPHILS # BLD AUTO: 0.08 K/UL — SIGNIFICANT CHANGE UP (ref 0–0.2)
BASOPHILS NFR BLD AUTO: 0.4 % — SIGNIFICANT CHANGE UP (ref 0–2)
EOSINOPHIL # BLD AUTO: 0.08 K/UL — SIGNIFICANT CHANGE UP (ref 0–0.5)
EOSINOPHIL NFR BLD AUTO: 0.4 % — SIGNIFICANT CHANGE UP (ref 0–6)
HCT VFR BLD CALC: 23.2 % — LOW (ref 34.5–45)
HCT VFR BLD CALC: 23.8 % — LOW (ref 34.5–45)
HCT VFR BLD CALC: 24.5 % — LOW (ref 34.5–45)
HGB BLD-MCNC: 7.3 G/DL — LOW (ref 11.5–15.5)
HGB BLD-MCNC: 7.6 G/DL — LOW (ref 11.5–15.5)
HGB BLD-MCNC: 7.8 G/DL — LOW (ref 11.5–15.5)
IMM GRANULOCYTES NFR BLD AUTO: 1.2 % — SIGNIFICANT CHANGE UP (ref 0–1.5)
LYMPHOCYTES # BLD AUTO: 11.3 % — LOW (ref 13–44)
LYMPHOCYTES # BLD AUTO: 2.54 K/UL — SIGNIFICANT CHANGE UP (ref 1–3.3)
MCHC RBC-ENTMCNC: 28.9 PG — SIGNIFICANT CHANGE UP (ref 27–34)
MCHC RBC-ENTMCNC: 29 PG — SIGNIFICANT CHANGE UP (ref 27–34)
MCHC RBC-ENTMCNC: 29.2 PG — SIGNIFICANT CHANGE UP (ref 27–34)
MCHC RBC-ENTMCNC: 31.5 GM/DL — LOW (ref 32–36)
MCHC RBC-ENTMCNC: 31.8 GM/DL — LOW (ref 32–36)
MCHC RBC-ENTMCNC: 31.9 GM/DL — LOW (ref 32–36)
MCV RBC AUTO: 90.8 FL — SIGNIFICANT CHANGE UP (ref 80–100)
MCV RBC AUTO: 91.7 FL — SIGNIFICANT CHANGE UP (ref 80–100)
MCV RBC AUTO: 91.8 FL — SIGNIFICANT CHANGE UP (ref 80–100)
MONOCYTES # BLD AUTO: 0.88 K/UL — SIGNIFICANT CHANGE UP (ref 0–0.9)
MONOCYTES NFR BLD AUTO: 3.9 % — SIGNIFICANT CHANGE UP (ref 2–14)
NEUTROPHILS # BLD AUTO: 18.69 K/UL — HIGH (ref 1.8–7.4)
NEUTROPHILS NFR BLD AUTO: 82.8 % — HIGH (ref 43–77)
PLATELET # BLD AUTO: 254 K/UL — SIGNIFICANT CHANGE UP (ref 150–400)
PLATELET # BLD AUTO: 294 K/UL — SIGNIFICANT CHANGE UP (ref 150–400)
PLATELET # BLD AUTO: 304 K/UL — SIGNIFICANT CHANGE UP (ref 150–400)
RBC # BLD: 2.53 M/UL — LOW (ref 3.8–5.2)
RBC # BLD: 2.62 M/UL — LOW (ref 3.8–5.2)
RBC # BLD: 2.67 M/UL — LOW (ref 3.8–5.2)
RBC # FLD: 14.4 % — SIGNIFICANT CHANGE UP (ref 10.3–14.5)
RBC # FLD: 14.6 % — HIGH (ref 10.3–14.5)
RBC # FLD: 14.7 % — HIGH (ref 10.3–14.5)
WBC # BLD: 19.87 K/UL — HIGH (ref 3.8–10.5)
WBC # BLD: 19.89 K/UL — HIGH (ref 3.8–10.5)
WBC # BLD: 22.53 K/UL — HIGH (ref 3.8–10.5)
WBC # FLD AUTO: 19.87 K/UL — HIGH (ref 3.8–10.5)
WBC # FLD AUTO: 19.89 K/UL — HIGH (ref 3.8–10.5)
WBC # FLD AUTO: 22.53 K/UL — HIGH (ref 3.8–10.5)

## 2019-10-31 RX ORDER — FERROUS SULFATE 325(65) MG
325 TABLET ORAL DAILY
Refills: 0 | Status: DISCONTINUED | OUTPATIENT
Start: 2019-10-31 | End: 2019-10-31

## 2019-10-31 RX ORDER — IBUPROFEN 200 MG
600 TABLET ORAL EVERY 6 HOURS
Refills: 0 | Status: DISCONTINUED | OUTPATIENT
Start: 2019-10-31 | End: 2019-11-02

## 2019-10-31 RX ORDER — FERROUS SULFATE 325(65) MG
325 TABLET ORAL THREE TIMES A DAY
Refills: 0 | Status: DISCONTINUED | OUTPATIENT
Start: 2019-10-31 | End: 2019-11-02

## 2019-10-31 RX ADMIN — Medication 325 MILLIGRAM(S): at 14:40

## 2019-10-31 RX ADMIN — Medication 30 MILLIGRAM(S): at 00:35

## 2019-10-31 RX ADMIN — Medication 30 MILLIGRAM(S): at 05:57

## 2019-10-31 RX ADMIN — Medication 975 MILLIGRAM(S): at 10:00

## 2019-10-31 RX ADMIN — Medication 100 MILLIGRAM(S): at 05:42

## 2019-10-31 RX ADMIN — Medication 100 MILLIGRAM(S): at 22:06

## 2019-10-31 RX ADMIN — Medication 30 MILLIGRAM(S): at 05:42

## 2019-10-31 RX ADMIN — Medication 216 GRAM(S): at 18:44

## 2019-10-31 RX ADMIN — Medication 216 GRAM(S): at 12:06

## 2019-10-31 RX ADMIN — Medication 250 MILLIGRAM(S): at 18:45

## 2019-10-31 RX ADMIN — Medication 250 MILLIGRAM(S): at 05:42

## 2019-10-31 RX ADMIN — Medication 100 MILLIGRAM(S): at 05:11

## 2019-10-31 RX ADMIN — Medication 975 MILLIGRAM(S): at 20:38

## 2019-10-31 RX ADMIN — Medication 100 MILLIGRAM(S): at 15:40

## 2019-10-31 RX ADMIN — Medication 325 MILLIGRAM(S): at 22:14

## 2019-10-31 RX ADMIN — Medication 600 MILLIGRAM(S): at 19:38

## 2019-10-31 RX ADMIN — Medication 975 MILLIGRAM(S): at 09:27

## 2019-10-31 RX ADMIN — HEPARIN SODIUM 5000 UNIT(S): 5000 INJECTION INTRAVENOUS; SUBCUTANEOUS at 05:44

## 2019-10-31 RX ADMIN — Medication 100 MILLIGRAM(S): at 14:39

## 2019-10-31 RX ADMIN — Medication 30 MILLIGRAM(S): at 00:20

## 2019-10-31 RX ADMIN — Medication 216 GRAM(S): at 00:20

## 2019-10-31 RX ADMIN — Medication 216 GRAM(S): at 06:17

## 2019-10-31 RX ADMIN — Medication 600 MILLIGRAM(S): at 18:44

## 2019-10-31 RX ADMIN — Medication 100 MILLIGRAM(S): at 22:00

## 2019-10-31 RX ADMIN — HEPARIN SODIUM 5000 UNIT(S): 5000 INJECTION INTRAVENOUS; SUBCUTANEOUS at 18:45

## 2019-10-31 RX ADMIN — Medication 975 MILLIGRAM(S): at 21:38

## 2019-10-31 NOTE — PROGRESS NOTE ADULT - PROBLEM SELECTOR PLAN 1
- Stable  - Hgb 10 -> 7.6  - Pain: well controlled on PO pain meds  - GI: regular diet  - : voiding  - DVT ppx: heparin  - Continue routine postop care  - Dispo: POD3, unless otherwise specified

## 2019-10-31 NOTE — PROGRESS NOTE ADULT - ATTENDING COMMENTS
Patient seen and examined    ; feeling well   Incision - clean /dry and intact     routine post op care   circumcision today

## 2019-11-01 LAB
HCT VFR BLD CALC: 24.7 % — LOW (ref 34.5–45)
HGB BLD-MCNC: 7.6 G/DL — LOW (ref 11.5–15.5)

## 2019-11-01 RX ADMIN — Medication 216 GRAM(S): at 07:55

## 2019-11-01 RX ADMIN — Medication 600 MILLIGRAM(S): at 20:58

## 2019-11-01 RX ADMIN — HEPARIN SODIUM 5000 UNIT(S): 5000 INJECTION INTRAVENOUS; SUBCUTANEOUS at 05:33

## 2019-11-01 RX ADMIN — Medication 100 MILLIGRAM(S): at 06:12

## 2019-11-01 RX ADMIN — Medication 600 MILLIGRAM(S): at 14:14

## 2019-11-01 RX ADMIN — Medication 325 MILLIGRAM(S): at 05:32

## 2019-11-01 RX ADMIN — Medication 250 MILLIGRAM(S): at 06:13

## 2019-11-01 RX ADMIN — Medication 250 MILLIGRAM(S): at 18:27

## 2019-11-01 RX ADMIN — Medication 975 MILLIGRAM(S): at 18:27

## 2019-11-01 RX ADMIN — Medication 600 MILLIGRAM(S): at 01:11

## 2019-11-01 RX ADMIN — Medication 975 MILLIGRAM(S): at 07:58

## 2019-11-01 RX ADMIN — Medication 600 MILLIGRAM(S): at 05:32

## 2019-11-01 RX ADMIN — Medication 100 MILLIGRAM(S): at 05:29

## 2019-11-01 RX ADMIN — Medication 600 MILLIGRAM(S): at 19:58

## 2019-11-01 RX ADMIN — Medication 975 MILLIGRAM(S): at 08:50

## 2019-11-01 RX ADMIN — Medication 600 MILLIGRAM(S): at 00:10

## 2019-11-01 RX ADMIN — Medication 325 MILLIGRAM(S): at 14:13

## 2019-11-01 RX ADMIN — Medication 975 MILLIGRAM(S): at 19:09

## 2019-11-01 RX ADMIN — HEPARIN SODIUM 5000 UNIT(S): 5000 INJECTION INTRAVENOUS; SUBCUTANEOUS at 18:27

## 2019-11-01 RX ADMIN — Medication 100 MILLIGRAM(S): at 14:15

## 2019-11-01 RX ADMIN — Medication 600 MILLIGRAM(S): at 06:26

## 2019-11-01 RX ADMIN — Medication 600 MILLIGRAM(S): at 14:37

## 2019-11-01 RX ADMIN — Medication 216 GRAM(S): at 00:10

## 2019-11-01 NOTE — MEDICAL STUDENT PROGRESS NOTE(EDUCATION) - SUBJECTIVE AND OBJECTIVE BOX
JERMAINE PEDERSEN is a 33y  s/p pCS now POD2 of a viable male infant.     SUBJECTIVE:  No acute events overnight, patient has no complaints.  Pain is well controlled with PRN pain medication. Patient reports back pain and some pain on left side of incision  She is ambulating, voiding, tolerating PO. Denies N/V  + flatus, + BM.  Normal lochia.    OBJECTIVE:  Physical exam:  General: AOx3, NAD.  Abdomen: Soft, appropriately tender to palpitation. Incision is clean dry and intact.  Ext: No DVT signs, warm extremities.    Vital Signs Last 24 Hrs  T(C): 37.6 (31 Oct 2019 19:24), Max: 37.6 (31 Oct 2019 19:24)  T(F): 99.7 (31 Oct 2019 19:24), Max: 99.7 (31 Oct 2019 19:24)  HR: 83 (31 Oct 2019 19:24) (80 - 83)  BP: 102/66 (31 Oct 2019 19:24) (97/60 - 102/66)  RR: 18 (31 Oct 2019 19:24) (18 - 20)  SpO2: 99% (31 Oct 2019 19:24) (99% - 99%)    LABS:                        7.8    19.89 )-----------( 304      ( 31 Oct 2019 20:08 )             24.5 JERMAINE PEDERSNE is a 33y  s/p pCS for failure of descent now POD2 of a viable male infant.     SUBJECTIVE:  No acute events overnight, patient has no complaints.  Pain is well controlled with PRN pain medication. Patient reports back pain and some pain on left side of incision.  She is ambulating, voiding, tolerating PO. Denies N/V  +flatus, +BM.  Normal lochia.    OBJECTIVE:  Physical exam:  General: AOx3, NAD.  Abdomen: Soft, appropriately tender to palpitation. Incision is clean dry and intact.  Ext: No DVT signs, warm extremities.    Vital Signs Last 24 Hrs  T(C): 37.6 (31 Oct 2019 19:24), Max: 37.6 (31 Oct 2019 19:24)  T(F): 99.7 (31 Oct 2019 19:24), Max: 99.7 (31 Oct 2019 19:24)  HR: 83 (31 Oct 2019 19:24) (80 - 83)  BP: 102/66 (31 Oct 2019 19:24) (97/60 - 102/66)  RR: 18 (31 Oct 2019 19:24) (18 - 20)  SpO2: 99% (31 Oct 2019 19:24) (99% - 99%)    LABS:                        7.8    19.89 )-----------( 304      ( 31 Oct 2019 20:08 )             24.5 JERMAINE PEDERSEN is a 33y  s/p pCS for failure of descent now POD2 of a viable male infant.     SUBJECTIVE:  No acute events overnight, patient has no complaints.  Pain is well controlled with PRN pain medication. Patient reports back pain and some pain on left side of incision.  She is ambulating, voiding, tolerating PO. Denies N/V  +flatus, +BM.  Normal lochia.    OBJECTIVE:  Physical exam:  General: AOx3, NAD.  Abdomen: Soft, appropriately tender to palpitation. Incision is clean dry and intact. No increased drainage or edema noted on the left side of the incision.  Ext: No DVT signs, warm extremities.    Vital Signs Last 24 Hrs  T(C): 37.6 (31 Oct 2019 19:24), Max: 37.6 (31 Oct 2019 19:24)  T(F): 99.7 (31 Oct 2019 19:24), Max: 99.7 (31 Oct 2019 19:24)  HR: 83 (31 Oct 2019 19:24) (80 - 83)  BP: 102/66 (31 Oct 2019 19:24) (97/60 - 102/66)  RR: 18 (31 Oct 2019 19:24) (18 - 20)  SpO2: 99% (31 Oct 2019 19:24) (99% - 99%)    LABS:                        7.8    19.89 )-----------( 304      ( 31 Oct 2019 20:08 )             24.5

## 2019-11-01 NOTE — MEDICAL STUDENT PROGRESS NOTE(EDUCATION) - NS MD HP STUD ASPLAN PLAN FT
- Stable  - Hgb 7.6 --> 7.3 --> 7.8  - Pain: well controlled on PO pain meds  - GI: regular diet  - : voiding  - DVT ppx: Ambulation  - Continue routine postop care  - Dispo: POD3, unless otherwise specified - Stable  - Hgb 10 -> 7.6 --> 7.3 --> 7.8  - Continue PO ferrous sulfate  - Pain: well controlled on PO pain meds  - GI: regular diet  - : voiding  - DVT ppx: Heparin  - Continue routine postop care  - Dispo: POD3, unless otherwise specified

## 2019-11-02 VITALS
HEART RATE: 58 BPM | DIASTOLIC BLOOD PRESSURE: 72 MMHG | TEMPERATURE: 98 F | RESPIRATION RATE: 18 BRPM | SYSTOLIC BLOOD PRESSURE: 114 MMHG | OXYGEN SATURATION: 99 %

## 2019-11-02 PROCEDURE — 85027 COMPLETE CBC AUTOMATED: CPT

## 2019-11-02 PROCEDURE — 88307 TISSUE EXAM BY PATHOLOGIST: CPT

## 2019-11-02 PROCEDURE — 59050 FETAL MONITOR W/REPORT: CPT

## 2019-11-02 PROCEDURE — 81001 URINALYSIS AUTO W/SCOPE: CPT

## 2019-11-02 PROCEDURE — 36415 COLL VENOUS BLD VENIPUNCTURE: CPT

## 2019-11-02 PROCEDURE — 86901 BLOOD TYPING SEROLOGIC RH(D): CPT

## 2019-11-02 PROCEDURE — 86900 BLOOD TYPING SEROLOGIC ABO: CPT

## 2019-11-02 PROCEDURE — G0463: CPT

## 2019-11-02 PROCEDURE — 85018 HEMOGLOBIN: CPT

## 2019-11-02 PROCEDURE — 82962 GLUCOSE BLOOD TEST: CPT

## 2019-11-02 PROCEDURE — 86850 RBC ANTIBODY SCREEN: CPT

## 2019-11-02 PROCEDURE — 86780 TREPONEMA PALLIDUM: CPT

## 2019-11-02 PROCEDURE — 76815 OB US LIMITED FETUS(S): CPT

## 2019-11-02 PROCEDURE — 85014 HEMATOCRIT: CPT

## 2019-11-02 PROCEDURE — 59025 FETAL NON-STRESS TEST: CPT

## 2019-11-02 PROCEDURE — 86765 RUBEOLA ANTIBODY: CPT

## 2019-11-02 RX ORDER — IBUPROFEN 200 MG
1 TABLET ORAL
Qty: 56 | Refills: 0
Start: 2019-11-02 | End: 2019-11-15

## 2019-11-02 RX ORDER — OXYCODONE HYDROCHLORIDE 5 MG/1
1 TABLET ORAL
Qty: 12 | Refills: 0
Start: 2019-11-02 | End: 2019-11-04

## 2019-11-02 RX ADMIN — Medication 600 MILLIGRAM(S): at 04:22

## 2019-11-02 RX ADMIN — Medication 325 MILLIGRAM(S): at 05:28

## 2019-11-02 RX ADMIN — HEPARIN SODIUM 5000 UNIT(S): 5000 INJECTION INTRAVENOUS; SUBCUTANEOUS at 05:28

## 2019-11-02 RX ADMIN — Medication 600 MILLIGRAM(S): at 05:31

## 2019-11-02 NOTE — PROGRESS NOTE ADULT - SUBJECTIVE AND OBJECTIVE BOX
POD#3 s/p  section  fe/male  Blood type:  Rubella Immune    S:  The patient has no complaints.  She is ambulating, voiding, tolerating PO, +flatus.      O:    T(C): 36.8 (19 @ 20:00), Max: 36.8 (19 @ 20:00)  HR: 63 (19 @ 20:00) (63 - 67)  BP: 112/66 (19 @ 20:00) (111/- - 112/66)  RR: 18 (19 @ 20:00) (18 - 18)  SpO2: 99% (19 @ 20:00) (99% - 99%)  Wt(kg): --    Labs  Complete Blood Count (10..19 @ 20:08)    WBC Count: 19.89 K/uL    Hemoglobin: 7.8 g/dL    Hematocrit: 24.5 %    Platelet Count - Automated: 304 K/uL    Complete Blood Count (10.31.19 @ 17:17)    WBC Count: 19.87 K/uL    Hemoglobin: 7.3 g/dL    Hematocrit: 23.2 %    Platelet Count - Automated: 294 K/uL    Complete Blood Count + Automated Diff (10.31.19 @ 06:15)    WBC Count: 22.53 K/uL    Hemoglobin: 7.6 g/dL    Hematocrit: 23.8 %    Platelet Count - Automated: 254 K/uL    Complete Blood Count + Automated Diff (10.28.19 @ 21:36)    WBC Count: 8.55 K/uL    Hemoglobin: 10.0 g/dL    Hematocrit: 30.7 %    Platelet Count - Automated: 334 K/uL      Physical Exam  Chest:  Clear to auscultation bilaterally.  No rales, rhonchi, wheezes.  Cardiovascular:  +s1, s2.  Regular rate and rhythm.  Abdomen:  soft, non-tender, non-distended with bowel sounds present.  Uterus:  Fundus firm below umbilicus  Incision:  Clean, dry, intact.  Vaginal exam:  Lochia.  Extremities:  Non-tender.      A/P:  POD#3  -Stable.  Continue routine postpartum/postoperative care and education.  -Discharge home with instructions.
Postoperative day #2 status post primary  section of a MALE infant.  O POSITIVE, Rubella Immune      S:  The patient is ambulating, voiding, tolerating a regular diet and passing flatus.      O:        Vitals  T(C): 36.5 (19 @ 09:52), Max: 37.6 (10-31-19 @ 19:24)  HR: 67 (19 @ 09:52) (67 - 83)  BP: 111/- (19 @ 09:52) (102/66 - 111/-)  RR: 18 (19 @ 09:52) (18 - 18)  SpO2: 99% (10-31-19 @ 19:24) (99% - 99%)      Physical Exam  Chest:  Clear to auscultation bilaterally.  No rales, rhonchi, wheezes.  Cardiovascular:  +s1, s2.  Regular rate and rhythm.  Abdomen:  Soft, non-tender, non-distended with bowel sounds present.  Uterus:  Fundus firm below umbilicus.  Incision:  Clean, dry, intact.  Vaginal exam:  Lochia.  Extremities:  Non-tender.      A/P:  POD#2  -Stable.  Continue routine postpartum/postoperative care and education.  -Anticipate discharge tomorrow.
JERMAINE PEDERSEN is a 33y  s/p pCS for arrest of descent now POD1 of a viable male infant.     SUBJECTIVE:  No acute events overnight, patient has no complaints.  Pain is well controlled with PRN pain medication.  She is ambulating, voiding, tolerating PO. Denies N/V  -flatus, -BM.  minimal lochia.    OBJECTIVE:  Physical exam:  General: AOx3, NAD.  Abdomen: +BS, Soft, appropriately tender to palpitation, firm uterine fundus at umbilicus. Dressing removed and replaced--incision is clean dry and intact.  Ext: No DVT signs, warm extremities.    Vital Signs Last 24 Hrs  T(C): 36.8 (31 Oct 2019 05:00), Max: 37.2 (30 Oct 2019 07:38)  T(F): 98.2 (31 Oct 2019 05:00), Max: 99 (30 Oct 2019 07:38)  HR: 78 (31 Oct 2019 05:00) (53 - 79)  BP: 89/52 (31 Oct 2019 05:00) (86/54 - 122/68)  BP(mean): 96 (30 Oct 2019 07:38) (96 - 96)  RR: 18 (31 Oct 2019 05:00) (18 - 22)  SpO2: 95% (31 Oct 2019 05:00) (95% - 100%)    LABS:                        7.6    22.53 )-----------( 254      ( 31 Oct 2019 06:15 )             23.8

## 2019-11-06 ENCOUNTER — APPOINTMENT (OUTPATIENT)
Dept: OBGYN | Facility: CLINIC | Age: 34
End: 2019-11-06
Payer: COMMERCIAL

## 2019-11-06 VITALS
WEIGHT: 152 LBS | SYSTOLIC BLOOD PRESSURE: 137 MMHG | BODY MASS INDEX: 28.7 KG/M2 | HEIGHT: 61 IN | DIASTOLIC BLOOD PRESSURE: 85 MMHG

## 2019-11-06 DIAGNOSIS — O24.410 GESTATIONAL DIABETES MELLITUS IN PREGNANCY, DIET CONTROLLED: ICD-10-CM

## 2019-11-06 DIAGNOSIS — O28.0 ABNORMAL HEMATOLOGICAL FINDING ON ANTENATAL SCREENING OF MOTHER: ICD-10-CM

## 2019-11-06 DIAGNOSIS — O09.899 ABNORMAL HEMATOLOGICAL FINDING ON ANTENATAL SCREENING OF MOTHER: ICD-10-CM

## 2019-11-06 LAB
BILIRUB UR QL STRIP: NORMAL
COLLECTION METHOD: NORMAL
GLUCOSE UR-MCNC: NORMAL
HCG UR QL: 0.2 EU/DL
HGB UR QL STRIP.AUTO: ABNORMAL
KETONES UR-MCNC: NORMAL
LEUKOCYTE ESTERASE UR QL STRIP: ABNORMAL
NITRITE UR QL STRIP: NORMAL
PH UR STRIP: 5
PROT UR STRIP-MCNC: NORMAL
SP GR UR STRIP: 1.01

## 2019-11-06 PROCEDURE — 81003 URINALYSIS AUTO W/O SCOPE: CPT | Mod: NC,QW

## 2019-11-06 PROCEDURE — 0503F POSTPARTUM CARE VISIT: CPT

## 2019-11-07 LAB — SURGICAL PATHOLOGY STUDY: SIGNIFICANT CHANGE UP

## 2019-11-09 PROBLEM — O28.0 HIGH RISK PREGNANCY WITH LOW PAPPA: Status: RESOLVED | Noted: 2019-05-09 | Resolved: 2019-11-09

## 2019-11-09 PROBLEM — O24.410 DIET CONTROLLED GESTATIONAL DIABETES MELLITUS (GDM) IN THIRD TRIMESTER: Status: RESOLVED | Noted: 2019-08-27 | Resolved: 2019-11-09

## 2019-11-09 NOTE — HISTORY OF PRESENT ILLNESS
[Good] : being in good health [Last Pap ___] : Last cervical pap smear was [unfilled] [Reproductive Age] : is of reproductive age [Definite:  ___ (Date)] : the last menstrual period was [unfilled] [Menarche Age: ____] : age at menarche was [unfilled] [Sexually Active] : is sexually active [Monogamous] : is monogamous [Male ___] : [unfilled] male [NA] : N/A [Contraception] : does not use contraception

## 2019-11-09 NOTE — END OF VISIT
[FreeTextEntry3] : I, Mikael Arias, acted solely as a scribe for Dr. Freeman on this date 11/06/2019.\par All medical record entries made by the Scribe were at my, Dr. Freeman's direction and personally dictated by me on  11/06/2019. I have reviewed the chart and agree that the record accurately reflects my personal performance of the history, physical exam, assessment and plan. I have also personally directed, reviewed, and agreed with the chart.\par \par

## 2019-11-09 NOTE — REVIEW OF SYSTEMS
[Nl] : Integumentary [Chills] : no chills [Fever] : no fever [Sleep Disturbances] : sleep disturbances [Dizziness] : dizziness [Anxiety] : no anxiety [Depression] : no depression

## 2019-11-09 NOTE — CHIEF COMPLAINT
[FreeTextEntry1] : Blood pressure check. 1 WEEK PP. PRIMARY C/S 10/30/2019 MALE 8LBS 9OZ "TICO". CURRENTLY BREAST AND BOTTLE FEEDING

## 2019-11-09 NOTE — PHYSICAL EXAM
[Awake] : awake [Alert] : alert [Oriented x3] : oriented to person, place, and time [Acute Distress] : no acute distress [Flat Affect] : affect not flat [Depressed Mood] : not depressed

## 2019-11-13 ENCOUNTER — APPOINTMENT (OUTPATIENT)
Dept: OBGYN | Facility: CLINIC | Age: 34
End: 2019-11-13
Payer: COMMERCIAL

## 2019-11-13 VITALS
SYSTOLIC BLOOD PRESSURE: 118 MMHG | BODY MASS INDEX: 27.56 KG/M2 | DIASTOLIC BLOOD PRESSURE: 40 MMHG | HEIGHT: 61 IN | WEIGHT: 146 LBS

## 2019-11-13 PROCEDURE — 0503F POSTPARTUM CARE VISIT: CPT

## 2019-11-13 NOTE — END OF VISIT
[FreeTextEntry3] : I, Fatumanguyễn Fonseca, acted solely as a scribe for Dr. Freeman on this 11/13/19. \par \par All medical record entries made by the Scribe were at Dr. Freeman's direction and personally dictated by me on 11/13/19. I have reviewed the chart and agree that the record accurately reflects my personal performance of history, physical exam, assessment and plan. I have also personally directed, reviewed, and agreed with the chart. \par

## 2019-11-13 NOTE — REVIEW OF SYSTEMS
[Nl] : Hematologic/Lymphatic [Fever] : no fever [Chills] : no chills [Pelvic Pain] : no pelvic pain [Anxiety] : no anxiety [Abn Vag Bleeding] : abnormal vaginal bleeding [Depression] : no depression

## 2019-11-13 NOTE — HISTORY OF PRESENT ILLNESS
[Last Pap ___] : Last cervical pap smear was [unfilled] [Reproductive Age] : is of reproductive age [Pregnancy History] : pregnancy history: [Total Preg ___] : [unfilled] [Living ___] : [unfilled] [Full Term ___] : [unfilled] [Definite:  ___ (Date)] : the last menstrual period was [unfilled] [Menarche Age: ____] : age at menarche was [unfilled] [Menstrual Problems] : reports normal menses [Contraception] : does not use contraception [Sexually Active] : is not sexually active

## 2019-12-11 ENCOUNTER — APPOINTMENT (OUTPATIENT)
Dept: OBGYN | Facility: CLINIC | Age: 34
End: 2019-12-11
Payer: COMMERCIAL

## 2019-12-11 ENCOUNTER — APPOINTMENT (OUTPATIENT)
Dept: OBGYN | Facility: CLINIC | Age: 34
End: 2019-12-11

## 2019-12-11 DIAGNOSIS — R81 OTHER SPECIFIED PREGNANCY RELATED CONDITIONS, UNSPECIFIED TRIMESTER: ICD-10-CM

## 2019-12-11 DIAGNOSIS — O26.899 OTHER SPECIFIED PREGNANCY RELATED CONDITIONS, UNSPECIFIED TRIMESTER: ICD-10-CM

## 2019-12-11 DIAGNOSIS — O24.415 GESTATIONAL DIABETES MELLITUS IN PREGNANCY, CONTROLLED BY ORAL HYPOGLYCEMIC DRUGS: ICD-10-CM

## 2019-12-11 DIAGNOSIS — O34.42 MATERNAL CARE FOR OTHER ABNORMALITIES OF CERVIX, SECOND TRIMESTER: ICD-10-CM

## 2019-12-11 DIAGNOSIS — O99.019 ANEMIA COMPLICATING PREGNANCY, UNSPECIFIED TRIMESTER: ICD-10-CM

## 2019-12-11 PROCEDURE — 0503F POSTPARTUM CARE VISIT: CPT

## 2019-12-11 PROCEDURE — 81025 URINE PREGNANCY TEST: CPT | Mod: NC

## 2019-12-11 RX ORDER — FOLIC ACID 1 MG/1
1 TABLET ORAL DAILY
Qty: 30 | Refills: 3 | Status: DISCONTINUED | COMMUNITY
Start: 2019-05-09 | End: 2019-12-11

## 2019-12-11 RX ORDER — ASPIRIN 81 MG/1
81 TABLET, CHEWABLE ORAL DAILY
Refills: 0 | Status: DISCONTINUED | COMMUNITY
Start: 2019-09-10 | End: 2019-12-11

## 2019-12-11 RX ORDER — PRENATAL VIT NO.130/IRON/FOLIC 27MG-0.8MG
TABLET ORAL
Refills: 0 | Status: DISCONTINUED | COMMUNITY
End: 2019-12-11

## 2019-12-11 RX ORDER — METFORMIN HYDROCHLORIDE 500 MG/1
500 TABLET, COATED ORAL
Qty: 30 | Refills: 3 | Status: DISCONTINUED | COMMUNITY
Start: 2019-09-10 | End: 2019-12-11

## 2019-12-17 LAB
CANDIDA VAG CYTO: NOT DETECTED
G VAGINALIS+PREV SP MTYP VAG QL MICRO: NOT DETECTED
HCG UR QL: NEGATIVE
QUALITY CONTROL: YES
T VAGINALIS VAG QL WET PREP: NOT DETECTED

## 2019-12-22 NOTE — PHYSICAL EXAM
[Awake] : awake [Alert] : alert [Soft] : soft [Oriented x3] : oriented to person, place, and time [Normal] : external genitalia [Labia Majora] : labia major [Labia Minora] : labia minora [Acute Distress] : no acute distress [Mass] : no breast mass [Nipple Discharge] : no nipple discharge [Axillary LAD] : no axillary lymphadenopathy [Tender] : non tender [Distended] : not distended [Depressed Mood] : not depressed [Flat Affect] : affect not flat

## 2019-12-22 NOTE — HISTORY OF PRESENT ILLNESS
[Last Pap ___] : Last cervical pap smear was [unfilled] [Definite:  ___ (Date)] : the last menstrual period was [unfilled] [Menarche Age: ____] : age at menarche was [unfilled] [Sexually Active] : is sexually active [Male ___] : [unfilled] male [Contraception] : does not use contraception

## 2019-12-22 NOTE — COUNSELING
[Breast Self Exam] : breast self exam [Safe Sexual Practices] : safe sexual practices [Contraception] : contraception

## 2019-12-22 NOTE — REVIEW OF SYSTEMS
[Nl] : Integumentary [Fever] : no fever [Chills] : no chills [Chest Pain] : no chest pain [Dyspnea] : no shortness of breath [Abdominal Pain] : no abdominal pain [Vomiting] : no vomiting [Pelvic Pain] : no pelvic pain [Abn Vag Bleeding] : no abnormal vaginal bleeding [Frequency] : no frequency [Anxiety] : no anxiety [Depression] : no depression

## 2019-12-22 NOTE — CHIEF COMPLAINT
[FreeTextEntry1] : 6 week Post Partum- "Yasir," boy, 10/30/2019, 8lb 9oz, breast and bottle feeding, using formula as needed GL

## 2020-01-22 ENCOUNTER — APPOINTMENT (OUTPATIENT)
Dept: ORTHOPEDIC SURGERY | Facility: CLINIC | Age: 35
End: 2020-01-22
Payer: COMMERCIAL

## 2020-01-22 PROCEDURE — 99213 OFFICE O/P EST LOW 20 MIN: CPT

## 2020-01-22 NOTE — ASSESSMENT
[FreeTextEntry1] : Patient with bilateral de Quervain's tenosynovitis. Her symptoms are worse on the left than the right. The nature of these conditions were described at length with the patient she verbalized understanding. Currently the patient is breast-feeding and anti-inflammatory therapy would be contraindicated at this time. I recommend ice and comfort cool braces for activities. Activity modification was discussed with the patient and she verbalized understanding. She will followup in 4 weeks should her symptoms not improve for possible cortisone injections. The patient is in agreement with this plan and appreciative of her care.

## 2020-01-22 NOTE — PHYSICAL EXAM
[Normal Finger/nose] : finger to nose coordination [Normal] : no peripheral adenopathy appreciated [de-identified] : GABINOR [de-identified] : There is a positive Finkelstein on the bilateral wrists. There is swelling and tenderness localized over the first dorsal compartment bilaterally without evidence of infection.\par There is a negative grind test bilaterally. Negative tenderness or instability of the MP or IP joint of the thumbs bilaterally. Negative tenderness over the A1 pulleys bilaterally. 5 over 5 strength bilaterally. \par

## 2020-01-22 NOTE — HISTORY OF PRESENT ILLNESS
[FreeTextEntry1] : 01/22/2020: The patient is a 34-year-old left-hand-dominant female who presents to the office with bilateral radial sided wrist pain. She is 3 months postpartum and has been having her symptoms since the beginning of her pregnancy. She describes her symptoms as a sharp pain located over the first dorsal compartment bilaterally. It is worse with activity and better but achy at rest. She is currently breast-feeding and unable to take anti-inflammatory medications. Symptoms often radiate up the forearm. She notes that she did have paresthesias in the median nerve distribution throughout her pregnancy which have completely resolved in the postpartum period.

## 2020-01-27 ENCOUNTER — APPOINTMENT (OUTPATIENT)
Dept: OBGYN | Facility: CLINIC | Age: 35
End: 2020-01-27

## 2020-04-03 ENCOUNTER — APPOINTMENT (OUTPATIENT)
Dept: DISASTER EMERGENCY | Facility: CLINIC | Age: 35
End: 2020-04-03

## 2020-04-25 ENCOUNTER — MESSAGE (OUTPATIENT)
Age: 35
End: 2020-04-25

## 2020-05-26 DIAGNOSIS — Z86.19 PERSONAL HISTORY OF OTHER INFECTIOUS AND PARASITIC DISEASES: ICD-10-CM

## 2020-07-08 ENCOUNTER — APPOINTMENT (OUTPATIENT)
Dept: OBGYN | Facility: CLINIC | Age: 35
End: 2020-07-08

## 2020-09-08 LAB
ESTIMATED AVERAGE GLUCOSE: 123 MG/DL
HBA1C MFR BLD HPLC: 5.9 %

## 2020-11-05 LAB
HCG SERPL-MCNC: 51 MIU/ML
PROGEST SERPL-MCNC: 13.5 NG/ML

## 2020-11-11 LAB — HCG SERPL-MCNC: 2 MIU/ML

## 2020-12-23 PROBLEM — Z86.19 HISTORY OF CANDIDIASIS OF VAGINA: Status: RESOLVED | Noted: 2020-05-26 | Resolved: 2020-12-23

## 2021-01-07 ENCOUNTER — NON-APPOINTMENT (OUTPATIENT)
Age: 36
End: 2021-01-07

## 2021-01-09 LAB — SARS-COV-2 N GENE NPH QL NAA+PROBE: NOT DETECTED

## 2021-02-01 ENCOUNTER — APPOINTMENT (OUTPATIENT)
Dept: OBGYN | Facility: CLINIC | Age: 36
End: 2021-02-01

## 2021-02-23 NOTE — OB RN PREOPERATIVE CHECKLIST - NS_PREOPBLOODCONS_OBGYN_ALL_OB
Airway  Performed by: Tali Summers MD  Authorized by: Tali Summers MD     Final Airway Type:  Endotracheal airway  Final Endotracheal Airway*:  ETT  ETT Size (mm)*:  7.0  Cuff*:  Regular  Technique Used for Successful ETT Placement:  Video laryngoscopy  Devices/Methods Used in Placement*:  Mask, Stylet and Oral ETT  Intubation Procedure*:  Preoxygenation, ETCO2, Atraumatic, Dentition Unchanged and Phaynx Clear  Insertion Site:  Oral  Blade Type*:  Glidescope  Blade Size*:  3  Cuff Volume (mL):  8  Measured from*:  Lips  Secured at (cm)*:  20  Placement Verified by: auscultation, capnometry and equal breath sounds    Glottic View*:  1 - full view of glottis  Attempts*:  2  Ventilation Between Attempts:  2 hand mask  Number of Other Approaches Attempted:  1  Unsuccessful Airway(s) Attempted:  Bag valve mask and endotracheal tube  Unsuccessful Approach(es) for ETT:  Direct laryngoscopy   Patient Identified, Procedure confirmed, Emergency equipment available and Safety protocols followed  Location:  OR  Urgency:  Elective  Difficult Airway: No    Indications for Airway Management:  Anesthesia  Spontaneous Ventilation: absent    Sedation Level:  Anesthetized  Mask Difficulty Assessment:  2 - vent by mask + OA or adjuvant +/- NMBA  Performed By:  Anesthesiologist  Anesthesiologist:  Tali Summers MD   First attempt by EMT student: DL via MAC-3 blade with use of bougie due to anterior positioned VCs. Unsuccessful intubation. Second look (but no attempt) via MIL-2, with VCV III. Third look (second attempt) with glidescope, VCV I, no difficulty passing ETT through VC. Atraumatic intubation.  Between first and second attempt, patient was easily masked with oral airway. SpO2 never dropped below 97%.  No damage to lips or teeth or vocal cords during intubation.         n/a

## 2021-04-05 LAB
HCG SERPL-MCNC: 3801 MIU/ML
PROGEST SERPL-MCNC: 18.1 NG/ML

## 2021-04-13 ENCOUNTER — TRANSCRIPTION ENCOUNTER (OUTPATIENT)
Age: 36
End: 2021-04-13

## 2021-04-20 ENCOUNTER — APPOINTMENT (OUTPATIENT)
Dept: OBGYN | Facility: CLINIC | Age: 36
End: 2021-04-20
Payer: COMMERCIAL

## 2021-04-20 ENCOUNTER — APPOINTMENT (OUTPATIENT)
Dept: OBGYN | Facility: CLINIC | Age: 36
End: 2021-04-20

## 2021-04-20 ENCOUNTER — ASOB RESULT (OUTPATIENT)
Age: 36
End: 2021-04-20

## 2021-04-20 VITALS
TEMPERATURE: 98.8 F | BODY MASS INDEX: 29.83 KG/M2 | SYSTOLIC BLOOD PRESSURE: 128 MMHG | HEIGHT: 61 IN | WEIGHT: 158 LBS | DIASTOLIC BLOOD PRESSURE: 80 MMHG

## 2021-04-20 PROCEDURE — 99072 ADDL SUPL MATRL&STAF TM PHE: CPT

## 2021-04-20 PROCEDURE — 99213 OFFICE O/P EST LOW 20 MIN: CPT | Mod: 25

## 2021-04-20 PROCEDURE — 76817 TRANSVAGINAL US OBSTETRIC: CPT

## 2021-04-20 NOTE — DISCUSSION/SUMMARY
[FreeTextEntry1] : We reviewed the results of  today's pelvic exam, significant for possible implantation bleeding. 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 Rt Ov has a corpus luteum Cyst 2.5 cm. Cervix appears long and closed. Fetus was visualized and measured to be +FH, 7w4d. \par \par Patient will return for new prenatal appointment. \par \par During this visit 20 minutes were spent face-to-face with greater than 50% of the time dedicated to counseling.\par

## 2021-04-20 NOTE — PHYSICAL EXAM
[Appropriately responsive] : appropriately responsive [Alert] : alert [No Acute Distress] : no acute distress [No Lymphadenopathy] : no lymphadenopathy [Soft] : soft [Non-tender] : non-tender [Non-distended] : non-distended [No HSM] : No HSM [No Lesions] : no lesions [No Mass] : no mass [Oriented x3] : oriented x3 [Labia Majora] : normal [Labia Minora] : normal [No Bleeding] : There was no active vaginal bleeding [Normal] : normal [Uterine Adnexae] : normal

## 2021-04-20 NOTE — END OF VISIT
[FreeTextEntry3] : I, Sadya Thakur solely acted as scribe for Dr. Jayant Jackson on 04/20/2021. All medical entries made by the Scribe were at my, Dr. Jackson's, direction and personally dictated by me on 04/20/2021. I have reviewed the chart and agree that the record accurately reflects my personal performance of the history, physical exam, assessment, and plan. I have also personally directed, reviewed, and agreed with the chart.

## 2021-04-20 NOTE — HISTORY OF PRESENT ILLNESS
[N] : Patient does not use contraception [Y] : Positive pregnancy history [Light Bleeding] : light bleeding [Menarche Age: ____] : age at menarche was [unfilled] [Currently Active] : currently active [Men] : men [No] : No [Patient refuses STI testing] : Patient refuses STI testing [TextBox_4] : pt states coming in for bleeding that started today and mild cramping [PapSmeardate] : 1/26/19 [TextBox_31] : neg [HPVDate] : 1/26/19 [TextBox_78] : neg [LMPDate] : 2/26/2021 [PGHxTotal] : 3 [[facility] Center[facility]ulerm] : 1 [[facility] Center[facility]] : 1 [PGHxABSpont] : 1 [FreeTextEntry1] : 2/26/21

## 2021-04-24 LAB — SARS-COV-2 N GENE NPH QL NAA+PROBE: NOT DETECTED

## 2021-05-03 ENCOUNTER — NON-APPOINTMENT (OUTPATIENT)
Age: 36
End: 2021-05-03

## 2021-05-03 ENCOUNTER — APPOINTMENT (OUTPATIENT)
Dept: OBGYN | Facility: CLINIC | Age: 36
End: 2021-05-03
Payer: COMMERCIAL

## 2021-05-03 ENCOUNTER — ASOB RESULT (OUTPATIENT)
Age: 36
End: 2021-05-03

## 2021-05-03 VITALS
BODY MASS INDEX: 29.64 KG/M2 | WEIGHT: 157 LBS | DIASTOLIC BLOOD PRESSURE: 74 MMHG | HEIGHT: 61 IN | SYSTOLIC BLOOD PRESSURE: 120 MMHG

## 2021-05-03 DIAGNOSIS — Z20.822 CONTACT WITH AND (SUSPECTED) EXPOSURE TO COVID-19: ICD-10-CM

## 2021-05-03 DIAGNOSIS — L98.9 DISORDER OF THE SKIN AND SUBCUTANEOUS TISSUE, UNSPECIFIED: ICD-10-CM

## 2021-05-03 DIAGNOSIS — R21 RASH AND OTHER NONSPECIFIC SKIN ERUPTION: ICD-10-CM

## 2021-05-03 LAB
BILIRUB UR QL STRIP: NORMAL
GLUCOSE UR-MCNC: NORMAL
HCG UR QL: 0.2 EU/DL
HCG UR QL: POSITIVE
HGB UR QL STRIP.AUTO: ABNORMAL
KETONES UR-MCNC: NORMAL
LEUKOCYTE ESTERASE UR QL STRIP: ABNORMAL
NITRITE UR QL STRIP: NORMAL
PH UR STRIP: 7
PROT UR STRIP-MCNC: NORMAL
QUALITY CONTROL: YES
SP GR UR STRIP: 1.02

## 2021-05-03 PROCEDURE — 99072 ADDL SUPL MATRL&STAF TM PHE: CPT

## 2021-05-03 PROCEDURE — 81025 URINE PREGNANCY TEST: CPT | Mod: NC

## 2021-05-03 PROCEDURE — 0500F INITIAL PRENATAL CARE VISIT: CPT

## 2021-05-03 PROCEDURE — 36415 COLL VENOUS BLD VENIPUNCTURE: CPT

## 2021-05-03 PROCEDURE — 76817 TRANSVAGINAL US OBSTETRIC: CPT

## 2021-05-05 ENCOUNTER — NON-APPOINTMENT (OUTPATIENT)
Age: 36
End: 2021-05-05

## 2021-05-06 LAB
ESTIMATED AVERAGE GLUCOSE: 114 MG/DL
HBA1C MFR BLD HPLC: 5.6 %

## 2021-05-18 ENCOUNTER — NON-APPOINTMENT (OUTPATIENT)
Age: 36
End: 2021-05-18

## 2021-05-19 ENCOUNTER — APPOINTMENT (OUTPATIENT)
Dept: CARDIOLOGY | Facility: CLINIC | Age: 36
End: 2021-05-19
Payer: COMMERCIAL

## 2021-05-19 VITALS
SYSTOLIC BLOOD PRESSURE: 110 MMHG | HEIGHT: 61 IN | RESPIRATION RATE: 16 BRPM | WEIGHT: 158 LBS | DIASTOLIC BLOOD PRESSURE: 70 MMHG | TEMPERATURE: 98.2 F | HEART RATE: 80 BPM | BODY MASS INDEX: 29.83 KG/M2

## 2021-05-19 PROCEDURE — 99072 ADDL SUPL MATRL&STAF TM PHE: CPT

## 2021-05-19 PROCEDURE — 93000 ELECTROCARDIOGRAM COMPLETE: CPT

## 2021-05-19 PROCEDURE — 99204 OFFICE O/P NEW MOD 45 MIN: CPT

## 2021-05-19 RX ORDER — TRANEXAMIC ACID 650 MG/1
650 TABLET ORAL
Qty: 30 | Refills: 3 | Status: DISCONTINUED | COMMUNITY
Start: 2021-03-01 | End: 2021-05-19

## 2021-05-19 RX ORDER — CLINDAMYCIN PHOSPHATE 1 G/10ML
1 GEL TOPICAL 3 TIMES DAILY
Qty: 1 | Refills: 3 | Status: DISCONTINUED | COMMUNITY
Start: 2020-03-13 | End: 2021-05-19

## 2021-05-19 RX ORDER — METRONIDAZOLE 7.5 MG/G
0.75 GEL VAGINAL
Qty: 1 | Refills: 3 | Status: DISCONTINUED | COMMUNITY
Start: 2020-11-03 | End: 2021-05-19

## 2021-05-19 RX ORDER — NORGESTIMATE AND ETHINYL ESTRADIOL 0.25-0.035
0.25-35 KIT ORAL DAILY
Qty: 3 | Refills: 1 | Status: DISCONTINUED | COMMUNITY
Start: 2020-05-26 | End: 2021-05-19

## 2021-05-19 RX ORDER — TERCONAZOLE 8 MG/G
0.8 CREAM VAGINAL
Qty: 1 | Refills: 1 | Status: DISCONTINUED | COMMUNITY
Start: 2020-05-26 | End: 2021-05-19

## 2021-05-19 RX ORDER — CLOTRIMAZOLE AND BETAMETHASONE DIPROPIONATE 10; .5 MG/G; MG/G
1-0.05 CREAM TOPICAL TWICE DAILY
Qty: 1 | Refills: 0 | Status: DISCONTINUED | COMMUNITY
Start: 2020-05-26 | End: 2021-05-19

## 2021-05-19 RX ORDER — SCOPOLAMINE 1.5 MG/1
1 PATCH, EXTENDED RELEASE TRANSDERMAL
Qty: 12 | Refills: 2 | Status: DISCONTINUED | COMMUNITY
Start: 2021-05-03 | End: 2021-05-19

## 2021-05-19 RX ORDER — FLUCONAZOLE 150 MG/1
150 TABLET ORAL
Qty: 2 | Refills: 1 | Status: DISCONTINUED | COMMUNITY
Start: 2020-10-21 | End: 2021-05-19

## 2021-05-19 RX ORDER — TERCONAZOLE 8 MG/G
0.8 CREAM VAGINAL
Qty: 1 | Refills: 4 | Status: DISCONTINUED | COMMUNITY
Start: 2020-10-22 | End: 2021-05-19

## 2021-05-19 RX ORDER — NORETHINDRONE 0.35 MG/1
0.35 TABLET ORAL DAILY
Qty: 3 | Refills: 3 | Status: DISCONTINUED | COMMUNITY
Start: 2019-12-11 | End: 2021-05-19

## 2021-05-20 ENCOUNTER — APPOINTMENT (OUTPATIENT)
Dept: CARDIOLOGY | Facility: CLINIC | Age: 36
End: 2021-05-20
Payer: COMMERCIAL

## 2021-05-20 PROCEDURE — 99072 ADDL SUPL MATRL&STAF TM PHE: CPT

## 2021-05-20 PROCEDURE — 93306 TTE W/DOPPLER COMPLETE: CPT

## 2021-05-21 ENCOUNTER — NON-APPOINTMENT (OUTPATIENT)
Age: 36
End: 2021-05-21

## 2021-05-21 ENCOUNTER — APPOINTMENT (OUTPATIENT)
Dept: OBGYN | Facility: CLINIC | Age: 36
End: 2021-05-21
Payer: COMMERCIAL

## 2021-05-21 ENCOUNTER — ASOB RESULT (OUTPATIENT)
Age: 36
End: 2021-05-21

## 2021-05-21 VITALS
BODY MASS INDEX: 29.64 KG/M2 | WEIGHT: 157 LBS | SYSTOLIC BLOOD PRESSURE: 90 MMHG | HEIGHT: 61 IN | DIASTOLIC BLOOD PRESSURE: 60 MMHG

## 2021-05-21 LAB
25(OH)D3 SERPL-MCNC: 22.6 NG/ML
ABO + RH PNL BLD: NORMAL
B19V IGG SER QL IA: 4.36 INDEX
B19V IGG+IGM SER-IMP: NORMAL
B19V IGG+IGM SER-IMP: POSITIVE
B19V IGM FLD-ACNC: 0.14 INDEX
B19V IGM SER-ACNC: NEGATIVE
BASOPHILS # BLD AUTO: 0.08 K/UL
BASOPHILS NFR BLD AUTO: 0.6 %
BILIRUB UR QL STRIP: NORMAL
BLD GP AB SCN SERPL QL: NORMAL
CMV IGG SERPL QL: <0.2 U/ML
CMV IGG SERPL-IMP: NEGATIVE
CMV IGM SERPL QL: <8 AU/ML
CMV IGM SERPL QL: NEGATIVE
EOSINOPHIL # BLD AUTO: 0.49 K/UL
EOSINOPHIL NFR BLD AUTO: 3.4 %
GLUCOSE UR-MCNC: NORMAL
HBV SURFACE AG SER QL: NONREACTIVE
HCG UR QL: 0.2 EU/DL
HCT VFR BLD CALC: 39.1 %
HGB A MFR BLD: 97.4 %
HGB A2 MFR BLD: 2.6 %
HGB BLD-MCNC: 12.7 G/DL
HGB FRACT BLD-IMP: NORMAL
HGB UR QL STRIP.AUTO: NORMAL
HIV1+2 AB SPEC QL IA.RAPID: NONREACTIVE
IMM GRANULOCYTES NFR BLD AUTO: 0.7 %
KETONES UR-MCNC: NORMAL
LEUKOCYTE ESTERASE UR QL STRIP: ABNORMAL
LYMPHOCYTES # BLD AUTO: 3.13 K/UL
LYMPHOCYTES NFR BLD AUTO: 21.6 %
M TB IFN-G BLD-IMP: NEGATIVE
MAN DIFF?: NORMAL
MCHC RBC-ENTMCNC: 31.1 PG
MCHC RBC-ENTMCNC: 32.5 GM/DL
MCV RBC AUTO: 95.8 FL
MEV IGG FLD QL IA: 62.7 AU/ML
MEV IGG+IGM SER-IMP: POSITIVE
MONOCYTES # BLD AUTO: 0.86 K/UL
MONOCYTES NFR BLD AUTO: 5.9 %
NEUTROPHILS # BLD AUTO: 9.82 K/UL
NEUTROPHILS NFR BLD AUTO: 67.8 %
NITRITE UR QL STRIP: NORMAL
PH UR STRIP: 6.5
PLATELET # BLD AUTO: 357 K/UL
PROT UR STRIP-MCNC: NORMAL
QUANTIFERON TB PLUS MITOGEN MINUS NIL: 8.75 IU/ML
QUANTIFERON TB PLUS NIL: 0.02 IU/ML
QUANTIFERON TB PLUS TB1 MINUS NIL: -0.01 IU/ML
QUANTIFERON TB PLUS TB2 MINUS NIL: -0.01 IU/ML
RBC # BLD: 4.08 M/UL
RBC # FLD: 13.2 %
RUBV IGG FLD-ACNC: 2 INDEX
RUBV IGG SER-IMP: POSITIVE
SP GR UR STRIP: 1.02
T GONDII AB SER-IMP: NEGATIVE
T GONDII IGM SER QL: <3 AU/ML
T PALLIDUM AB SER QL IA: NEGATIVE
TSH SERPL-ACNC: 0.51 UIU/ML
WBC # FLD AUTO: 14.48 K/UL

## 2021-05-21 PROCEDURE — 36415 COLL VENOUS BLD VENIPUNCTURE: CPT

## 2021-05-21 PROCEDURE — 99072 ADDL SUPL MATRL&STAF TM PHE: CPT

## 2021-05-21 PROCEDURE — 0502F SUBSEQUENT PRENATAL CARE: CPT

## 2021-05-21 PROCEDURE — 76813 OB US NUCHAL MEAS 1 GEST: CPT

## 2021-05-25 ENCOUNTER — APPOINTMENT (OUTPATIENT)
Dept: OBGYN | Facility: CLINIC | Age: 36
End: 2021-05-25
Payer: COMMERCIAL

## 2021-05-25 PROCEDURE — 99072 ADDL SUPL MATRL&STAF TM PHE: CPT

## 2021-05-25 PROCEDURE — 99211 OFF/OP EST MAY X REQ PHY/QHP: CPT

## 2021-05-27 LAB — SARS-COV-2 N GENE NPH QL NAA+PROBE: NOT DETECTED

## 2021-06-09 ENCOUNTER — APPOINTMENT (OUTPATIENT)
Dept: CARDIOLOGY | Facility: CLINIC | Age: 36
End: 2021-06-09

## 2021-06-11 ENCOUNTER — TRANSCRIPTION ENCOUNTER (OUTPATIENT)
Age: 36
End: 2021-06-11

## 2021-06-18 ENCOUNTER — NON-APPOINTMENT (OUTPATIENT)
Age: 36
End: 2021-06-18

## 2021-06-18 ENCOUNTER — APPOINTMENT (OUTPATIENT)
Dept: OBGYN | Facility: CLINIC | Age: 36
End: 2021-06-18
Payer: COMMERCIAL

## 2021-06-18 VITALS
BODY MASS INDEX: 30.4 KG/M2 | SYSTOLIC BLOOD PRESSURE: 110 MMHG | HEIGHT: 61 IN | WEIGHT: 161 LBS | DIASTOLIC BLOOD PRESSURE: 58 MMHG

## 2021-06-18 LAB
BILIRUB UR QL STRIP: NORMAL
CLARITY UR: CLEAR
COLLECTION METHOD: NORMAL
GLUCOSE UR-MCNC: NORMAL
HCG UR QL: 0.2 EU/DL
HGB UR QL STRIP.AUTO: NORMAL
KETONES UR-MCNC: 15
LEUKOCYTE ESTERASE UR QL STRIP: NORMAL
NITRITE UR QL STRIP: NORMAL
PH UR STRIP: 6
PROT UR STRIP-MCNC: NORMAL
SP GR UR STRIP: 1.02

## 2021-06-18 PROCEDURE — 36415 COLL VENOUS BLD VENIPUNCTURE: CPT

## 2021-06-18 PROCEDURE — 0502F SUBSEQUENT PRENATAL CARE: CPT

## 2021-06-23 ENCOUNTER — NON-APPOINTMENT (OUTPATIENT)
Age: 36
End: 2021-06-23

## 2021-06-23 ENCOUNTER — APPOINTMENT (OUTPATIENT)
Dept: OBGYN | Facility: CLINIC | Age: 36
End: 2021-06-23
Payer: COMMERCIAL

## 2021-06-23 ENCOUNTER — APPOINTMENT (OUTPATIENT)
Dept: CARDIOLOGY | Facility: CLINIC | Age: 36
End: 2021-06-23

## 2021-06-23 VITALS
SYSTOLIC BLOOD PRESSURE: 110 MMHG | WEIGHT: 161 LBS | HEIGHT: 61 IN | BODY MASS INDEX: 30.4 KG/M2 | DIASTOLIC BLOOD PRESSURE: 60 MMHG

## 2021-06-23 LAB
BILIRUB UR QL STRIP: NORMAL
GLUCOSE UR-MCNC: NORMAL
HCG UR QL: 0.2 EU/DL
HGB UR QL STRIP.AUTO: NORMAL
KETONES UR-MCNC: NORMAL
LEUKOCYTE ESTERASE UR QL STRIP: NORMAL
NITRITE UR QL STRIP: NORMAL
PH UR STRIP: 6
PROT UR STRIP-MCNC: NORMAL
SP GR UR STRIP: 1.01

## 2021-06-23 PROCEDURE — 99213 OFFICE O/P EST LOW 20 MIN: CPT

## 2021-06-23 PROCEDURE — 81003 URINALYSIS AUTO W/O SCOPE: CPT | Mod: QW

## 2021-06-23 PROCEDURE — 99072 ADDL SUPL MATRL&STAF TM PHE: CPT

## 2021-06-24 ENCOUNTER — NON-APPOINTMENT (OUTPATIENT)
Age: 36
End: 2021-06-24

## 2021-06-24 LAB
APPEARANCE: CLEAR
BACTERIA: NEGATIVE
BILIRUBIN URINE: NEGATIVE
BLOOD URINE: ABNORMAL
COLOR: NORMAL
GLUCOSE QUALITATIVE U: NEGATIVE
HYALINE CASTS: 1 /LPF
KETONES URINE: NEGATIVE
LEUKOCYTE ESTERASE URINE: NEGATIVE
MICROSCOPIC-UA: NORMAL
NITRITE URINE: NEGATIVE
PH URINE: 6.5
PROTEIN URINE: NEGATIVE
RED BLOOD CELLS URINE: 14 /HPF
SPECIFIC GRAVITY URINE: 1.01
SQUAMOUS EPITHELIAL CELLS: 2 /HPF
UROBILINOGEN URINE: NORMAL
WHITE BLOOD CELLS URINE: 1 /HPF

## 2021-07-06 LAB
1ST TRIMESTER DATA: NORMAL
2ND TRIMESTER DATA: NORMAL
ADDENDUM DOC: NORMAL
AFP PNL SERPL: NORMAL
AFP SERPL-ACNC: NORMAL
AFP SERPL-ACNC: NORMAL
B-HCG FREE SERPL-MCNC: NORMAL
CLINICAL BIOCHEMIST REVIEW: NORMAL
FREE BETA HCG 1ST TRIMESTER: NORMAL
INHIBIN A SERPL-MCNC: NORMAL
NASAL BONE: PRESENT
NOTES NTD: NORMAL
NT: NORMAL
PAPP-A SERPL-ACNC: NORMAL
U ESTRIOL SERPL-SCNC: NORMAL

## 2021-07-13 LAB — BACTERIA UR CULT: NORMAL

## 2021-07-16 ENCOUNTER — NON-APPOINTMENT (OUTPATIENT)
Age: 36
End: 2021-07-16

## 2021-07-16 ENCOUNTER — APPOINTMENT (OUTPATIENT)
Dept: ANTEPARTUM | Facility: CLINIC | Age: 36
End: 2021-07-16
Payer: COMMERCIAL

## 2021-07-16 ENCOUNTER — APPOINTMENT (OUTPATIENT)
Dept: OBGYN | Facility: CLINIC | Age: 36
End: 2021-07-16

## 2021-07-16 ENCOUNTER — ASOB RESULT (OUTPATIENT)
Age: 36
End: 2021-07-16

## 2021-07-16 VITALS
BODY MASS INDEX: 31.15 KG/M2 | WEIGHT: 165 LBS | DIASTOLIC BLOOD PRESSURE: 70 MMHG | TEMPERATURE: 97.6 F | SYSTOLIC BLOOD PRESSURE: 118 MMHG | HEIGHT: 61 IN

## 2021-07-16 LAB
BILIRUB UR QL STRIP: NORMAL
GLUCOSE UR-MCNC: NORMAL
HCG UR QL: 0.2 EU/DL
HGB UR QL STRIP.AUTO: ABNORMAL
KETONES UR-MCNC: NORMAL
LEUKOCYTE ESTERASE UR QL STRIP: ABNORMAL
NITRITE UR QL STRIP: NORMAL
PH UR STRIP: 6.5
PROT UR STRIP-MCNC: NORMAL
SP GR UR STRIP: 1.02

## 2021-07-16 PROCEDURE — 99072 ADDL SUPL MATRL&STAF TM PHE: CPT

## 2021-07-16 PROCEDURE — 76811 OB US DETAILED SNGL FETUS: CPT

## 2021-07-16 PROCEDURE — 76817 TRANSVAGINAL US OBSTETRIC: CPT

## 2021-07-20 ENCOUNTER — APPOINTMENT (OUTPATIENT)
Dept: ULTRASOUND IMAGING | Facility: CLINIC | Age: 36
End: 2021-07-20

## 2021-07-28 ENCOUNTER — NON-APPOINTMENT (OUTPATIENT)
Age: 36
End: 2021-07-28

## 2021-07-30 ENCOUNTER — APPOINTMENT (OUTPATIENT)
Dept: CARDIOLOGY | Facility: CLINIC | Age: 36
End: 2021-07-30

## 2021-08-02 ENCOUNTER — APPOINTMENT (OUTPATIENT)
Dept: ANTEPARTUM | Facility: CLINIC | Age: 36
End: 2021-08-02
Payer: COMMERCIAL

## 2021-08-02 ENCOUNTER — ASOB RESULT (OUTPATIENT)
Age: 36
End: 2021-08-02

## 2021-08-02 PROCEDURE — 76816 OB US FOLLOW-UP PER FETUS: CPT

## 2021-08-02 PROCEDURE — 76817 TRANSVAGINAL US OBSTETRIC: CPT

## 2021-08-05 ENCOUNTER — NON-APPOINTMENT (OUTPATIENT)
Age: 36
End: 2021-08-05

## 2021-08-05 DIAGNOSIS — O46.90 ANTEPARTUM HEMORRHAGE, UNSPECIFIED, UNSPECIFIED TRIMESTER: ICD-10-CM

## 2021-08-05 DIAGNOSIS — Z3A.16 16 WEEKS GESTATION OF PREGNANCY: ICD-10-CM

## 2021-08-06 ENCOUNTER — APPOINTMENT (OUTPATIENT)
Dept: ANTEPARTUM | Facility: CLINIC | Age: 36
End: 2021-08-06
Payer: COMMERCIAL

## 2021-08-06 ENCOUNTER — NON-APPOINTMENT (OUTPATIENT)
Age: 36
End: 2021-08-06

## 2021-08-06 ENCOUNTER — ASOB RESULT (OUTPATIENT)
Age: 36
End: 2021-08-06

## 2021-08-06 ENCOUNTER — APPOINTMENT (OUTPATIENT)
Dept: MATERNAL FETAL MEDICINE | Facility: CLINIC | Age: 36
End: 2021-08-06
Payer: COMMERCIAL

## 2021-08-06 VITALS
DIASTOLIC BLOOD PRESSURE: 66 MMHG | HEIGHT: 62 IN | SYSTOLIC BLOOD PRESSURE: 122 MMHG | RESPIRATION RATE: 16 BRPM | HEART RATE: 70 BPM | BODY MASS INDEX: 30.73 KG/M2 | WEIGHT: 167 LBS | OXYGEN SATURATION: 98 %

## 2021-08-06 DIAGNOSIS — Z34.93 ENCOUNTER FOR SUPERVISION OF NORMAL PREGNANCY, UNSPECIFIED, THIRD TRIMESTER: ICD-10-CM

## 2021-08-06 DIAGNOSIS — Z3A.39 39 WEEKS GESTATION OF PREGNANCY: ICD-10-CM

## 2021-08-06 DIAGNOSIS — R73.09 OTHER ABNORMAL GLUCOSE: ICD-10-CM

## 2021-08-06 DIAGNOSIS — Z3A.37 37 WEEKS GESTATION OF PREGNANCY: ICD-10-CM

## 2021-08-06 DIAGNOSIS — Z00.00 ENCOUNTER FOR GENERAL ADULT MEDICAL EXAMINATION W/OUT ABNORMAL FINDINGS: ICD-10-CM

## 2021-08-06 DIAGNOSIS — Z87.42 PERSONAL HISTORY OF OTHER DISEASES OF THE FEMALE GENITAL TRACT: ICD-10-CM

## 2021-08-06 DIAGNOSIS — Z87.898 PERSONAL HISTORY OF OTHER SPECIFIED CONDITIONS: ICD-10-CM

## 2021-08-06 DIAGNOSIS — Z34.92 ENCOUNTER FOR SUPERVISION OF NORMAL PREGNANCY, UNSPECIFIED, SECOND TRIMESTER: ICD-10-CM

## 2021-08-06 DIAGNOSIS — Z20.822 CONTACT WITH AND (SUSPECTED) EXPOSURE TO COVID-19: ICD-10-CM

## 2021-08-06 DIAGNOSIS — N90.89 OTHER SPECIFIED NONINFLAMMATORY DISORDERS OF VULVA AND PERINEUM: ICD-10-CM

## 2021-08-06 DIAGNOSIS — M65.4 RADIAL STYLOID TENOSYNOVITIS [DE QUERVAIN]: ICD-10-CM

## 2021-08-06 DIAGNOSIS — Z87.59 PERSONAL HISTORY OF OTHER COMPLICATIONS OF PREGNANCY, CHILDBIRTH AND THE PUERPERIUM: ICD-10-CM

## 2021-08-06 DIAGNOSIS — Z13.1 ENCOUNTER FOR SCREENING FOR DIABETES MELLITUS: ICD-10-CM

## 2021-08-06 DIAGNOSIS — R31.29 OTHER MICROSCOPIC HEMATURIA: ICD-10-CM

## 2021-08-06 DIAGNOSIS — Z87.39 PERSONAL HISTORY OF OTHER DISEASES OF THE MUSCULOSKELETAL SYSTEM AND CONNECTIVE TISSUE: ICD-10-CM

## 2021-08-06 DIAGNOSIS — E78.9 DISORDER OF LIPOPROTEIN METABOLISM, UNSPECIFIED: ICD-10-CM

## 2021-08-06 DIAGNOSIS — O21.9 VOMITING OF PREGNANCY, UNSPECIFIED: ICD-10-CM

## 2021-08-06 DIAGNOSIS — Z3A.32 32 WEEKS GESTATION OF PREGNANCY: ICD-10-CM

## 2021-08-06 DIAGNOSIS — Z98.891 HISTORY OF UTERINE SCAR FROM PREVIOUS SURGERY: ICD-10-CM

## 2021-08-06 DIAGNOSIS — Z3A.28 28 WEEKS GESTATION OF PREGNANCY: ICD-10-CM

## 2021-08-06 DIAGNOSIS — O09.521 SUPERVISION OF ELDERLY MULTIGRAVIDA, FIRST TRIMESTER: ICD-10-CM

## 2021-08-06 DIAGNOSIS — Z86.19 PERSONAL HISTORY OF OTHER INFECTIOUS AND PARASITIC DISEASES: ICD-10-CM

## 2021-08-06 DIAGNOSIS — Z30.9 ENCOUNTER FOR CONTRACEPTIVE MANAGEMENT, UNSPECIFIED: ICD-10-CM

## 2021-08-06 DIAGNOSIS — N92.6 IRREGULAR MENSTRUATION, UNSPECIFIED: ICD-10-CM

## 2021-08-06 PROCEDURE — 99214 OFFICE O/P EST MOD 30 MIN: CPT | Mod: 25

## 2021-08-06 PROCEDURE — 76817 TRANSVAGINAL US OBSTETRIC: CPT

## 2021-08-06 NOTE — FAMILY HISTORY
[Age 35+ During Pregnancy] : not 35 or over during pregnancy [Reported Family History Of Birth Defects] : no congenital heart defects [Herb-Sachs Carrier] : no Herb-Sachs [Family History] : no mental retardation/autism [Reported Family History Of Genetic Disease] : no history of child defect in child of baby father

## 2021-08-06 NOTE — DISCUSSION/SUMMARY
[FreeTextEntry1] : She is 23 weeks gestation by her last menstrual period dates.\par \par She had a cervical LEEP during  for an abnormal Pap smear. Subsequent Pap smears have been normal. \par \par I told her that the 21 and today's ultrasound examination revealed cervical funneling and a short cervical length. She states that she has been having back pain, cramping pain and pelvic pressure on and off.  I told her that cervical funneling and a short cervix increases the risk for having a  delivery. I told her that the  treatment for a short cervix in women with a montano gestation with or without a prior spontaneous  birth is the administration of daily vaginal progesterone supplementation. The presence of dynamic cervical funneling and a short cervix is probably not suggestive of cervical insufficiency. I told her that to my knowledge, there are no studies demonstrating that absolute bedrest prevents  birth.  Observing absolute bedrest can be associated with deep venous thrombosis and pulmonary embolism.  She was advised to limit her activities and not have sexual intercourse. I told her to avoid standing for prolonged periods of time, avoid heavy lifting and bending, and avoid emotional stress.  She was advised to present to the hospital if she developed pelvic pressure, back pain, copious vaginal discharge, leaking of vaginal fluid, vaginal bleeding, abdominal tightening and abdominal cramping pain.  I gave her a prescription for Crinone 8% progesterone vaginal gel to apply to the vagina daily during the morning. She was advised to call my office if she has difficulties obtaining the vaginal progesterone medication. I also told her that I recommend a short course of oral Indomethacin therapy due to her history of back pain, cramping pain, pelvic pressure and cervical funneling. She was advised to return in 1 week for a cervical length examination. A Doppler study of the ductus arteriosus will be done when she returns next week for the cervical length measurement. \par \par She has an 8 cm uterine fibroid. I told her that growth of fibroids during pregnancy cannot be predicted. I told her that during the second trimester, smaller myomas (less = 6 cm) usually remain unchanged or increase in size and larger fibroids become smaller.  During the third trimester of pregnancy, fibroids usually remain unchanged or decrease in size.  I suggest serial ultrasounds to evaluate the size of her fibroids during the course of her pregnancy.  Placental implantation over or in contact with the fibroid increases the likelihood of placental abruption, miscarriage,  labor, and postpartum hemorrhage. She was told that the presence of multiple fibroids has been associated with an increased incidence of  labor and fetal malpresentations. I also told her that uterine fibroids can degenerate during pregnancy and cause acute abdominal pain, low-grade fever, and leukocytosis.  Management of uterine fibroid degeneration involves the use of analgesics and observation for  labor.   \par \par She had a prior  section delivery. She was informed of the risks and benefits of a trial of labor. She was informed of the risk of uterine rupture and the associated maternal complications such as hysterectomy, blood transfusions, and intensive care unit admission. She was also informed of the associated  adverse outcomes in cases of uterine rupture such as stillbirth, fetal hypoxia and neurological impairment (cerebral palsy). She expressed a desire to have a repeat  section birth with the current pregnancy.\par \par She is overweight and obesity has been associated with a number of maternal complications such as gestational diabetes, pre-eclampsia, thrombophlebitis, labor abnormalities, post-term pregnancies,  delivery, and operative complications. Obesity has been associated with adverse fetal outcomes such as late stillbirth and  deliveries.  Obese women also have a two to three-fold increased incidence in congenital anomalies. \par \par Regarding her advanced maternal age, she did not have genetic counseling. I informed her of the association between advanced maternal age and fetal chromosomal disorders such as Down syndrome and structural birth defects. She was told that all pregnancies have a 2 to 3% risk of having a baby born with a birth defect, and a 1 to 2 % risk of having a baby born with developmental problems that cannot be diagnosed during pregnancy. I told her that there are two types of birth defects, structural and chromosomal. She was made aware that prenatal diagnosis is available to determine whether the fetus she is carrying has normal or abnormal chromosomes,and normal or abnormal anatomy. I discussed the various screening and diagnostic tests used for prenatal diagnosis.  I explained the difference between screening and diagnostic tests.  She was offered genetic counseling. She decided to have genetic counseling and was given a referral to have the counseling.  I told her that advanced maternal age has been associated with a higher incidence of gestational diabetes, and preeclampsia /eclampsia.  I also told her that advanced maternal age has been associated with an increased risk of stillbirth, and therefore, I recommend delivery during the 39 week of gestation in the event she has not given birth by 39 weeks of gestation. \par \par I recommend a glucola challenge test to screen for gestational diabetes at 24 weeks gestation. I also recommend the Tdap vaccine between 27 and 36 weeks of gestation to prevent pertussis infection in the  infant. I recommend the flu vaccine during flu season (October through May). She should have serial ultrasounds to evaluate fetal growth and development.  I also suggest fetal surveillance during the third trimester of pregnancy with weekly NSTs or BPPs starting at 36 weeks gestation.  She can also perform daily fetal movement counts as an adjunct to the NSTs or BPPs.\par \par She has risk factors for having preeclampsia during pregnancy. I told her that taking a baby aspirin during pregnancy has been found to reduce the risk of developing preeclampsia. She was advised to take one baby aspirin daily. I also told her that she can alternate taking a baby aspirin one day and two baby aspirins the next day. I also told her to stop taking the baby aspirin once the pregnancy reaches 37 weeks gestation.

## 2021-08-06 NOTE — OB HISTORY
[Pregnancy History] : patient received anesthesia [___] : at [unfilled] weeks GA [LMP: ___] : LMP: [unfilled] [JEWELL: ___] : JEWELL: [unfilled] [EGA: ___ wks] : EGA: [unfilled] wks [Spontaneous] : Spontaneous conception [Sonogram] : sonogram [at ___ wks] : at [unfilled] weeks [Definite:  ___ (Date)] : the last menstrual period was [unfilled] [FreeTextEntry1] : First prenatal visit was May 3, 2021.

## 2021-08-06 NOTE — SURGICAL HISTORY
[Fibroids] : fibroids [Abn Paps] : abnormal pap smear [STI's] : STI's [Last Pap: ___] : Last Pap: [unfilled] [Breast Disease] : no breast disease [Infertility] : no infertility [Cysts] : no cysts [OC Use] : no OC use [Last Mammo: ___] : Last Mammo: none

## 2021-08-07 LAB
M TB IFN-G BLD-IMP: NEGATIVE
QUANTIFERON TB PLUS MITOGEN MINUS NIL: 7.67 IU/ML
QUANTIFERON TB PLUS NIL: 0.03 IU/ML
QUANTIFERON TB PLUS TB1 MINUS NIL: -0.01 IU/ML
QUANTIFERON TB PLUS TB2 MINUS NIL: 0 IU/ML

## 2021-08-09 LAB
1ST TRIMESTER DATA: NORMAL
ADDENDUM DOC: NORMAL
AFP PNL SERPL: NORMAL
AFP SERPL-ACNC: NORMAL
BASOPHILS # BLD AUTO: 0.07 K/UL
BASOPHILS NFR BLD AUTO: 0.6 %
CLARIM 15Q11.2: NORMAL
CLARIM 1P36: NORMAL
CLARIM 22Q11.2: NORMAL
CLARIM 4P-/WOLF-HIRSCHHORN: NORMAL
CLARIM 5P-/CRI DU CHAT: NORMAL
CLARIM ADDITIONAL INFO: NORMAL
CLARIM CHROMOSOME 13: NORMAL
CLARIM CHROMOSOME 18: NORMAL
CLARIM CHROMOSOME 21: NORMAL
CLARIM SEX CHROMOSOMES: NORMAL
CLARITEST NIPT W/MICRO: NORMAL
CLINICAL BIOCHEMIST REVIEW: NORMAL
EOSINOPHIL # BLD AUTO: 0.34 K/UL
EOSINOPHIL NFR BLD AUTO: 3 %
FREE BETA HCG 1ST TRIMESTER: NORMAL
HCT VFR BLD CALC: 37.4 %
HGB BLD-MCNC: 12 G/DL
IMM GRANULOCYTES NFR BLD AUTO: 0.4 %
LYMPHOCYTES # BLD AUTO: 2.15 K/UL
LYMPHOCYTES NFR BLD AUTO: 19 %
Lab: NORMAL
MAN DIFF?: NORMAL
MCHC RBC-ENTMCNC: 30.6 PG
MCHC RBC-ENTMCNC: 32.1 GM/DL
MCV RBC AUTO: 95.4 FL
MONOCYTES # BLD AUTO: 0.75 K/UL
MONOCYTES NFR BLD AUTO: 6.6 %
NASAL BONE: PRESENT
NEUTROPHILS # BLD AUTO: 7.94 K/UL
NEUTROPHILS NFR BLD AUTO: 70.4 %
NOTES NTD: NORMAL
NT: NORMAL
PAPP-A SERPL-ACNC: NORMAL
PLATELET # BLD AUTO: 328 K/UL
RBC # BLD: 3.92 M/UL
RBC # FLD: 13.2 %
T3 SERPL-MCNC: 180 NG/DL
T3FREE SERPL-MCNC: 3.04 PG/ML
T4 FREE SERPL-MCNC: 1.1 NG/DL
T4 SERPL-MCNC: 11.2 UG/DL
TRISOMY 18/3: NORMAL
TSH SERPL-ACNC: 0.51 UIU/ML
WBC # FLD AUTO: 11.3 K/UL

## 2021-08-11 ENCOUNTER — APPOINTMENT (OUTPATIENT)
Dept: ANTEPARTUM | Facility: CLINIC | Age: 36
End: 2021-08-11

## 2021-08-12 ENCOUNTER — APPOINTMENT (OUTPATIENT)
Dept: MATERNAL FETAL MEDICINE | Facility: CLINIC | Age: 36
End: 2021-08-12
Payer: COMMERCIAL

## 2021-08-12 ENCOUNTER — ASOB RESULT (OUTPATIENT)
Age: 36
End: 2021-08-12

## 2021-08-12 PROCEDURE — 99212 OFFICE O/P EST SF 10 MIN: CPT | Mod: 95

## 2021-08-13 ENCOUNTER — ASOB RESULT (OUTPATIENT)
Age: 36
End: 2021-08-13

## 2021-08-13 ENCOUNTER — APPOINTMENT (OUTPATIENT)
Dept: OBGYN | Facility: CLINIC | Age: 36
End: 2021-08-13
Payer: COMMERCIAL

## 2021-08-13 ENCOUNTER — APPOINTMENT (OUTPATIENT)
Dept: ANTEPARTUM | Facility: CLINIC | Age: 36
End: 2021-08-13
Payer: COMMERCIAL

## 2021-08-13 VITALS — DIASTOLIC BLOOD PRESSURE: 58 MMHG | SYSTOLIC BLOOD PRESSURE: 120 MMHG

## 2021-08-13 VITALS — HEIGHT: 62 IN | WEIGHT: 173 LBS | BODY MASS INDEX: 31.83 KG/M2

## 2021-08-13 LAB
BILIRUB UR QL STRIP: NORMAL
COLLECTION METHOD: NORMAL
GLUCOSE UR-MCNC: NORMAL
HCG UR QL: 0.2 EU/DL
HGB UR QL STRIP.AUTO: NORMAL
KETONES UR-MCNC: NORMAL
LEUKOCYTE ESTERASE UR QL STRIP: ABNORMAL
NITRITE UR QL STRIP: NORMAL
PH UR STRIP: 6
PROT UR STRIP-MCNC: NORMAL
SP GR UR STRIP: 1.02

## 2021-08-13 PROCEDURE — 76827 ECHO EXAM OF FETAL HEART: CPT

## 2021-08-13 PROCEDURE — 0502F SUBSEQUENT PRENATAL CARE: CPT

## 2021-08-13 PROCEDURE — 93325 DOPPLER ECHO COLOR FLOW MAPG: CPT

## 2021-08-13 PROCEDURE — 76817 TRANSVAGINAL US OBSTETRIC: CPT

## 2021-08-17 ENCOUNTER — NON-APPOINTMENT (OUTPATIENT)
Age: 36
End: 2021-08-17

## 2021-08-18 ENCOUNTER — NON-APPOINTMENT (OUTPATIENT)
Age: 36
End: 2021-08-18

## 2021-08-18 DIAGNOSIS — Z3A.23 23 WEEKS GESTATION OF PREGNANCY: ICD-10-CM

## 2021-08-18 LAB
BASOPHILS # BLD AUTO: 0.04 K/UL
BASOPHILS NFR BLD AUTO: 0.4 %
EOSINOPHIL # BLD AUTO: 0.26 K/UL
EOSINOPHIL NFR BLD AUTO: 2.3 %
GLUCOSE 1H P 50 G GLC PO SERPL-MCNC: 190 MG/DL
HCT VFR BLD CALC: 37 %
HGB BLD-MCNC: 11.8 G/DL
IMM GRANULOCYTES NFR BLD AUTO: 1.1 %
LYMPHOCYTES # BLD AUTO: 1.97 K/UL
LYMPHOCYTES NFR BLD AUTO: 17.3 %
MAN DIFF?: NORMAL
MCHC RBC-ENTMCNC: 31.5 PG
MCHC RBC-ENTMCNC: 31.9 GM/DL
MCV RBC AUTO: 98.7 FL
MONOCYTES # BLD AUTO: 0.37 K/UL
MONOCYTES NFR BLD AUTO: 3.3 %
NEUTROPHILS # BLD AUTO: 8.61 K/UL
NEUTROPHILS NFR BLD AUTO: 75.6 %
PLATELET # BLD AUTO: 328 K/UL
RBC # BLD: 3.75 M/UL
RBC # FLD: 13.7 %
WBC # FLD AUTO: 11.37 K/UL

## 2021-08-20 ENCOUNTER — ASOB RESULT (OUTPATIENT)
Age: 36
End: 2021-08-20

## 2021-08-20 ENCOUNTER — APPOINTMENT (OUTPATIENT)
Dept: ANTEPARTUM | Facility: CLINIC | Age: 36
End: 2021-08-20
Payer: COMMERCIAL

## 2021-08-20 PROCEDURE — 76817 TRANSVAGINAL US OBSTETRIC: CPT

## 2021-08-26 ENCOUNTER — APPOINTMENT (OUTPATIENT)
Dept: PEDIATRIC CARDIOLOGY | Facility: CLINIC | Age: 36
End: 2021-08-26
Payer: COMMERCIAL

## 2021-08-26 ENCOUNTER — ASOB RESULT (OUTPATIENT)
Age: 36
End: 2021-08-26

## 2021-08-26 ENCOUNTER — APPOINTMENT (OUTPATIENT)
Dept: MATERNAL FETAL MEDICINE | Facility: CLINIC | Age: 36
End: 2021-08-26
Payer: COMMERCIAL

## 2021-08-26 VITALS — WEIGHT: 170 LBS | HEIGHT: 62 IN | BODY MASS INDEX: 31.28 KG/M2

## 2021-08-26 PROCEDURE — 76827 ECHO EXAM OF FETAL HEART: CPT

## 2021-08-26 PROCEDURE — 99203 OFFICE O/P NEW LOW 30 MIN: CPT | Mod: 25

## 2021-08-26 PROCEDURE — G0108 DIAB MANAGE TRN  PER INDIV: CPT | Mod: 95

## 2021-08-26 PROCEDURE — 93325 DOPPLER ECHO COLOR FLOW MAPG: CPT | Mod: 59

## 2021-08-26 PROCEDURE — 76825 ECHO EXAM OF FETAL HEART: CPT

## 2021-08-26 PROCEDURE — 76821 MIDDLE CEREBRAL ARTERY ECHO: CPT

## 2021-08-26 PROCEDURE — 76820 UMBILICAL ARTERY ECHO: CPT

## 2021-08-30 ENCOUNTER — APPOINTMENT (OUTPATIENT)
Dept: ANTEPARTUM | Facility: CLINIC | Age: 36
End: 2021-08-30
Payer: COMMERCIAL

## 2021-08-30 ENCOUNTER — ASOB RESULT (OUTPATIENT)
Age: 36
End: 2021-08-30

## 2021-08-30 PROCEDURE — 76817 TRANSVAGINAL US OBSTETRIC: CPT

## 2021-08-30 PROCEDURE — 76816 OB US FOLLOW-UP PER FETUS: CPT

## 2021-09-01 ENCOUNTER — ASOB RESULT (OUTPATIENT)
Age: 36
End: 2021-09-01

## 2021-09-01 ENCOUNTER — APPOINTMENT (OUTPATIENT)
Dept: OBGYN | Facility: CLINIC | Age: 36
End: 2021-09-01
Payer: COMMERCIAL

## 2021-09-01 VITALS
WEIGHT: 170.5 LBS | HEIGHT: 62 IN | BODY MASS INDEX: 31.38 KG/M2 | DIASTOLIC BLOOD PRESSURE: 76 MMHG | SYSTOLIC BLOOD PRESSURE: 118 MMHG

## 2021-09-01 DIAGNOSIS — Z20.822 CONTACT WITH AND (SUSPECTED) EXPOSURE TO COVID-19: ICD-10-CM

## 2021-09-01 DIAGNOSIS — R10.2 PELVIC AND PERINEAL PAIN: ICD-10-CM

## 2021-09-01 PROCEDURE — 76818 FETAL BIOPHYS PROFILE W/NST: CPT

## 2021-09-01 PROCEDURE — 76817 TRANSVAGINAL US OBSTETRIC: CPT | Mod: 59

## 2021-09-01 PROCEDURE — 0502F SUBSEQUENT PRENATAL CARE: CPT

## 2021-09-02 LAB
BILIRUB UR QL STRIP: NORMAL
GLUCOSE UR-MCNC: NORMAL
HCG UR QL: 0.2 EU/DL
HGB UR QL STRIP.AUTO: ABNORMAL
KETONES UR-MCNC: NORMAL
LEUKOCYTE ESTERASE UR QL STRIP: NORMAL
NITRITE UR QL STRIP: NORMAL
PH UR STRIP: 6
PROT UR STRIP-MCNC: ABNORMAL
SARS-COV-2 N GENE NPH QL NAA+PROBE: NOT DETECTED
SP GR UR STRIP: 1.03

## 2021-09-07 LAB — BACTERIA UR CULT: NORMAL

## 2021-09-09 ENCOUNTER — APPOINTMENT (OUTPATIENT)
Dept: MATERNAL FETAL MEDICINE | Facility: CLINIC | Age: 36
End: 2021-09-09
Payer: COMMERCIAL

## 2021-09-09 ENCOUNTER — APPOINTMENT (OUTPATIENT)
Dept: ANTEPARTUM | Facility: CLINIC | Age: 36
End: 2021-09-09
Payer: COMMERCIAL

## 2021-09-09 ENCOUNTER — ASOB RESULT (OUTPATIENT)
Age: 36
End: 2021-09-09

## 2021-09-09 ENCOUNTER — APPOINTMENT (OUTPATIENT)
Dept: ANTEPARTUM | Facility: CLINIC | Age: 36
End: 2021-09-09

## 2021-09-09 VITALS
OXYGEN SATURATION: 98 % | SYSTOLIC BLOOD PRESSURE: 100 MMHG | BODY MASS INDEX: 31.1 KG/M2 | RESPIRATION RATE: 16 BRPM | HEIGHT: 62 IN | DIASTOLIC BLOOD PRESSURE: 70 MMHG | HEART RATE: 71 BPM | WEIGHT: 169 LBS

## 2021-09-09 DIAGNOSIS — Z98.890 MATERNAL CARE FOR OTHER ABNORMALITIES OF CERVIX, SECOND TRIMESTER: ICD-10-CM

## 2021-09-09 DIAGNOSIS — O34.42 MATERNAL CARE FOR OTHER ABNORMALITIES OF CERVIX, SECOND TRIMESTER: ICD-10-CM

## 2021-09-09 PROCEDURE — 76817 TRANSVAGINAL US OBSTETRIC: CPT

## 2021-09-09 PROCEDURE — 99215 OFFICE O/P EST HI 40 MIN: CPT | Mod: 25

## 2021-09-09 NOTE — PAST MEDICAL HISTORY
[HIV Infection] : no HIV [Exposure To Gonorrhea] : no gonorrhea [Syphilis] : no syphilis [Chlamydial Infections] : no chlamydia [Herpes Simplex] : no genital herpes [Human Papilloma Virus Infection] : no genital warts [Hepatitis, C Virus] : no Hepatitis C [Hepatitis, B Virus] : no Hepatitis B [Trichomoniasis] : no trichomoniasis

## 2021-09-13 ENCOUNTER — INPATIENT (INPATIENT)
Facility: HOSPITAL | Age: 36
LOS: 2 days | Discharge: HOME | DRG: 832 | End: 2021-09-16
Attending: SPECIALIST | Admitting: SPECIALIST
Payer: COMMERCIAL

## 2021-09-13 ENCOUNTER — ASOB RESULT (OUTPATIENT)
Age: 36
End: 2021-09-13

## 2021-09-13 ENCOUNTER — APPOINTMENT (OUTPATIENT)
Dept: OBGYN | Facility: CLINIC | Age: 36
End: 2021-09-13
Payer: COMMERCIAL

## 2021-09-13 VITALS
SYSTOLIC BLOOD PRESSURE: 102 MMHG | HEIGHT: 62 IN | BODY MASS INDEX: 31.47 KG/M2 | DIASTOLIC BLOOD PRESSURE: 60 MMHG | WEIGHT: 171 LBS

## 2021-09-13 VITALS
HEART RATE: 72 BPM | SYSTOLIC BLOOD PRESSURE: 117 MMHG | DIASTOLIC BLOOD PRESSURE: 65 MMHG | RESPIRATION RATE: 18 BRPM | TEMPERATURE: 98 F

## 2021-09-13 DIAGNOSIS — O60.03 PRETERM LABOR WITHOUT DELIVERY, THIRD TRIMESTER: ICD-10-CM

## 2021-09-13 DIAGNOSIS — O24.419 GESTATIONAL DIABETES MELLITUS IN PREGNANCY, UNSPECIFIED CONTROL: ICD-10-CM

## 2021-09-13 DIAGNOSIS — O34.30 MATERNAL CARE FOR CERVICAL INCOMPETENCE, UNSPECIFIED TRIMESTER: ICD-10-CM

## 2021-09-13 DIAGNOSIS — O26.893 OTHER SPECIFIED PREGNANCY RELATED CONDITIONS, THIRD TRIMESTER: ICD-10-CM

## 2021-09-13 DIAGNOSIS — O47.03 FALSE LABOR BEFORE 37 COMPLETED WEEKS OF GESTATION, THIRD TRIMESTER: ICD-10-CM

## 2021-09-13 DIAGNOSIS — O26.873 CERVICAL SHORTENING, THIRD TRIMESTER: ICD-10-CM

## 2021-09-13 DIAGNOSIS — O34.10 MATERNAL CARE FOR BENIGN TUMOR OF CORPUS UTERI, UNSPECIFIED TRIMESTER: ICD-10-CM

## 2021-09-13 DIAGNOSIS — Z98.890 OTHER SPECIFIED POSTPROCEDURAL STATES: Chronic | ICD-10-CM

## 2021-09-13 DIAGNOSIS — O47.02 FALSE LABOR BEFORE 37 COMPLETED WEEKS OF GESTATION, SECOND TRIMESTER: ICD-10-CM

## 2021-09-13 DIAGNOSIS — O34.33 MATERNAL CARE FOR CERVICAL INCOMPETENCE, THIRD TRIMESTER: ICD-10-CM

## 2021-09-13 PROBLEM — O34.42 HISTORY OF LOOP ELECTROSURGICAL EXCISION PROCEDURE (LEEP) OF CERVIX AFFECTING PREGNANCY IN SECOND TRIMESTER: Status: ACTIVE | Noted: 2021-08-06

## 2021-09-13 LAB
A1C WITH ESTIMATED AVERAGE GLUCOSE RESULT: 5.5 % — SIGNIFICANT CHANGE UP (ref 4–5.6)
ALBUMIN SERPL ELPH-MCNC: 3.7 G/DL — SIGNIFICANT CHANGE UP (ref 3.3–5.2)
ALP SERPL-CCNC: 130 U/L — HIGH (ref 40–120)
ALT FLD-CCNC: 12 U/L — SIGNIFICANT CHANGE UP
ANION GAP SERPL CALC-SCNC: 17 MMOL/L — SIGNIFICANT CHANGE UP (ref 5–17)
APPEARANCE UR: CLEAR — SIGNIFICANT CHANGE UP
AST SERPL-CCNC: 15 U/L — SIGNIFICANT CHANGE UP
BASOPHILS # BLD AUTO: 0.04 K/UL — SIGNIFICANT CHANGE UP (ref 0–0.2)
BASOPHILS NFR BLD AUTO: 0.3 % — SIGNIFICANT CHANGE UP (ref 0–2)
BILIRUB SERPL-MCNC: 0.2 MG/DL — LOW (ref 0.4–2)
BILIRUB UR QL STRIP: NORMAL
BILIRUB UR-MCNC: NEGATIVE — SIGNIFICANT CHANGE UP
BLD GP AB SCN SERPL QL: SIGNIFICANT CHANGE UP
BUN SERPL-MCNC: 6.8 MG/DL — LOW (ref 8–20)
CALCIUM SERPL-MCNC: 8.7 MG/DL — SIGNIFICANT CHANGE UP (ref 8.6–10.2)
CHLORIDE SERPL-SCNC: 101 MMOL/L — SIGNIFICANT CHANGE UP (ref 98–107)
CO2 SERPL-SCNC: 18 MMOL/L — LOW (ref 22–29)
COLOR SPEC: YELLOW — SIGNIFICANT CHANGE UP
CREAT SERPL-MCNC: 0.32 MG/DL — LOW (ref 0.5–1.3)
DIFF PNL FLD: ABNORMAL
EOSINOPHIL # BLD AUTO: 0.13 K/UL — SIGNIFICANT CHANGE UP (ref 0–0.5)
EOSINOPHIL NFR BLD AUTO: 1.1 % — SIGNIFICANT CHANGE UP (ref 0–6)
EPI CELLS # UR: SIGNIFICANT CHANGE UP
ESTIMATED AVERAGE GLUCOSE: 111 MG/DL — SIGNIFICANT CHANGE UP (ref 68–114)
FIBRONECTIN FETAL SPEC QL: POSITIVE
GLUCOSE SERPL-MCNC: 81 MG/DL — SIGNIFICANT CHANGE UP (ref 70–99)
GLUCOSE UR QL: NEGATIVE MG/DL — SIGNIFICANT CHANGE UP
GLUCOSE UR-MCNC: NORMAL
HCG UR QL: 0.2 EU/DL
HCT VFR BLD CALC: 33.2 % — LOW (ref 34.5–45)
HGB BLD-MCNC: 11.1 G/DL — LOW (ref 11.5–15.5)
HGB UR QL STRIP.AUTO: ABNORMAL
IMM GRANULOCYTES NFR BLD AUTO: 0.7 % — SIGNIFICANT CHANGE UP (ref 0–1.5)
KETONES UR-MCNC: NEGATIVE — SIGNIFICANT CHANGE UP
KETONES UR-MCNC: NORMAL
LEUKOCYTE ESTERASE UR QL STRIP: NORMAL
LEUKOCYTE ESTERASE UR-ACNC: ABNORMAL
LYMPHOCYTES # BLD AUTO: 2.58 K/UL — SIGNIFICANT CHANGE UP (ref 1–3.3)
LYMPHOCYTES # BLD AUTO: 21.4 % — SIGNIFICANT CHANGE UP (ref 13–44)
MCHC RBC-ENTMCNC: 30.7 PG — SIGNIFICANT CHANGE UP (ref 27–34)
MCHC RBC-ENTMCNC: 33.4 GM/DL — SIGNIFICANT CHANGE UP (ref 32–36)
MCV RBC AUTO: 92 FL — SIGNIFICANT CHANGE UP (ref 80–100)
MONOCYTES # BLD AUTO: 0.62 K/UL — SIGNIFICANT CHANGE UP (ref 0–0.9)
MONOCYTES NFR BLD AUTO: 5.1 % — SIGNIFICANT CHANGE UP (ref 2–14)
NEUTROPHILS # BLD AUTO: 8.6 K/UL — HIGH (ref 1.8–7.4)
NEUTROPHILS NFR BLD AUTO: 71.4 % — SIGNIFICANT CHANGE UP (ref 43–77)
NITRITE UR QL STRIP: NORMAL
NITRITE UR-MCNC: NEGATIVE — SIGNIFICANT CHANGE UP
PH UR STRIP: 6.5
PH UR: 5 — SIGNIFICANT CHANGE UP (ref 5–8)
PLATELET # BLD AUTO: 326 K/UL — SIGNIFICANT CHANGE UP (ref 150–400)
POTASSIUM SERPL-MCNC: 3.6 MMOL/L — SIGNIFICANT CHANGE UP (ref 3.5–5.3)
POTASSIUM SERPL-SCNC: 3.6 MMOL/L — SIGNIFICANT CHANGE UP (ref 3.5–5.3)
PROT SERPL-MCNC: 6.8 G/DL — SIGNIFICANT CHANGE UP (ref 6.6–8.7)
PROT UR STRIP-MCNC: NORMAL
PROT UR-MCNC: 100 MG/DL
RBC # BLD: 3.61 M/UL — LOW (ref 3.8–5.2)
RBC # FLD: 12.9 % — SIGNIFICANT CHANGE UP (ref 10.3–14.5)
RBC CASTS # UR COMP ASSIST: SIGNIFICANT CHANGE UP /HPF (ref 0–4)
SODIUM SERPL-SCNC: 136 MMOL/L — SIGNIFICANT CHANGE UP (ref 135–145)
SP GR SPEC: 1.02 — SIGNIFICANT CHANGE UP (ref 1.01–1.02)
SP GR UR STRIP: 1.02
UROBILINOGEN FLD QL: NEGATIVE MG/DL — SIGNIFICANT CHANGE UP
WBC # BLD: 12.05 K/UL — HIGH (ref 3.8–10.5)
WBC # FLD AUTO: 12.05 K/UL — HIGH (ref 3.8–10.5)
WBC UR QL: SIGNIFICANT CHANGE UP

## 2021-09-13 PROCEDURE — 76817 TRANSVAGINAL US OBSTETRIC: CPT | Mod: 59

## 2021-09-13 PROCEDURE — 99221 1ST HOSP IP/OBS SF/LOW 40: CPT

## 2021-09-13 PROCEDURE — 76816 OB US FOLLOW-UP PER FETUS: CPT

## 2021-09-13 PROCEDURE — 0502F SUBSEQUENT PRENATAL CARE: CPT

## 2021-09-13 RX ORDER — HUMAN INSULIN 100 [IU]/ML
12 INJECTION, SUSPENSION SUBCUTANEOUS AT BEDTIME
Refills: 0 | Status: DISCONTINUED | OUTPATIENT
Start: 2021-09-13 | End: 2021-09-15

## 2021-09-13 RX ORDER — DEXTROSE 50 % IN WATER 50 %
12.5 SYRINGE (ML) INTRAVENOUS ONCE
Refills: 0 | Status: DISCONTINUED | OUTPATIENT
Start: 2021-09-13 | End: 2021-09-16

## 2021-09-13 RX ORDER — SODIUM CHLORIDE 9 MG/ML
500 INJECTION, SOLUTION INTRAVENOUS
Refills: 0 | Status: DISCONTINUED | OUTPATIENT
Start: 2021-09-13 | End: 2021-09-16

## 2021-09-13 RX ORDER — DEXTROSE 50 % IN WATER 50 %
15 SYRINGE (ML) INTRAVENOUS ONCE
Refills: 0 | Status: DISCONTINUED | OUTPATIENT
Start: 2021-09-13 | End: 2021-09-16

## 2021-09-13 RX ORDER — FOLIC ACID 0.8 MG
1 TABLET ORAL DAILY
Refills: 0 | Status: DISCONTINUED | OUTPATIENT
Start: 2021-09-13 | End: 2021-09-14

## 2021-09-13 RX ORDER — INSULIN LISPRO 100/ML
VIAL (ML) SUBCUTANEOUS
Refills: 0 | Status: DISCONTINUED | OUTPATIENT
Start: 2021-09-13 | End: 2021-09-14

## 2021-09-13 RX ORDER — GLUCAGON INJECTION, SOLUTION 0.5 MG/.1ML
1 INJECTION, SOLUTION SUBCUTANEOUS ONCE
Refills: 0 | Status: DISCONTINUED | OUTPATIENT
Start: 2021-09-13 | End: 2021-09-16

## 2021-09-13 RX ORDER — INDOMETHACIN 50 MG
25 CAPSULE ORAL EVERY 6 HOURS
Refills: 0 | Status: DISCONTINUED | OUTPATIENT
Start: 2021-09-13 | End: 2021-09-14

## 2021-09-13 RX ORDER — MAGNESIUM SULFATE 500 MG/ML
4 VIAL (ML) INJECTION ONCE
Refills: 0 | Status: COMPLETED | OUTPATIENT
Start: 2021-09-13 | End: 2021-09-13

## 2021-09-13 RX ORDER — DEXTROSE 50 % IN WATER 50 %
25 SYRINGE (ML) INTRAVENOUS ONCE
Refills: 0 | Status: DISCONTINUED | OUTPATIENT
Start: 2021-09-13 | End: 2021-09-16

## 2021-09-13 RX ORDER — AMPICILLIN TRIHYDRATE 250 MG
2 CAPSULE ORAL ONCE
Refills: 0 | Status: COMPLETED | OUTPATIENT
Start: 2021-09-13 | End: 2021-09-13

## 2021-09-13 RX ORDER — MAGNESIUM SULFATE 500 MG/ML
2 VIAL (ML) INJECTION
Qty: 40 | Refills: 0 | Status: DISCONTINUED | OUTPATIENT
Start: 2021-09-13 | End: 2021-09-15

## 2021-09-13 RX ORDER — SODIUM CHLORIDE 9 MG/ML
1000 INJECTION, SOLUTION INTRAVENOUS
Refills: 0 | Status: DISCONTINUED | OUTPATIENT
Start: 2021-09-13 | End: 2021-09-16

## 2021-09-13 RX ORDER — INDOMETHACIN 50 MG
50 CAPSULE ORAL ONCE
Refills: 0 | Status: COMPLETED | OUTPATIENT
Start: 2021-09-13 | End: 2021-09-13

## 2021-09-13 RX ORDER — AMPICILLIN TRIHYDRATE 250 MG
1 CAPSULE ORAL EVERY 4 HOURS
Refills: 0 | Status: DISCONTINUED | OUTPATIENT
Start: 2021-09-13 | End: 2021-09-14

## 2021-09-13 RX ADMIN — Medication 50 MILLIGRAM(S): at 23:57

## 2021-09-13 RX ADMIN — Medication 12 MILLIGRAM(S): at 22:34

## 2021-09-13 RX ADMIN — Medication 216 GRAM(S): at 21:57

## 2021-09-13 RX ADMIN — Medication 50 GM/HR: at 22:20

## 2021-09-13 RX ADMIN — SODIUM CHLORIDE 50 MILLILITER(S): 9 INJECTION, SOLUTION INTRAVENOUS at 21:57

## 2021-09-13 RX ADMIN — Medication 300 GRAM(S): at 21:58

## 2021-09-13 NOTE — OB PROVIDER H&P - HISTORY OF PRESENT ILLNESS
34 y/o  at 28w3d (, consistent with first trimester sono, JEWELL 12/3) presenting from office where she was noted to have contractions q3m on monitor and was found to be 1cm dilated. In triage she reports no lower abdominal pain/cramping, however she says she never feels pain or cramping with contractions, her only symptom consistent with contractions is SOB at time of contraction, she says she barely feels any SOB today in triage but has intermittently felt it during her pregnancy. Denies leakage of fluid or vaginal bleeding. +FM   Prenatal course significant for:   1. GDMA2, starting on Humulin 12u HS today, hasnt started yet   2. cervical funneling and short cervical length in this pregnancy, currently on vaginal progesterone and s/p Indocin 7d course within the last month for symtpomatic  contractions +  3. fibroid uterus in pregnancy, [id number] EDUIN left sided fibroid, symptomatic, s/p indocin   4. hx of C/S x1, 2019    ObHx:   G1: 2019, pCS, 39w, arrest of descent in 2nd stage of labor, otherwise uncomplicated   G2: , chemical pregnancy, without subsequent D&C   GynHx:  Hx of abnormal pap; denies history of endometriosis, or ovarian cysts; denies history of STD's   PMH/PSH: THELMA    Meds: PNV, Humulin 12u HS (new, started today)   Allergies: NKDA   SocHx: denies use of EtOH, illicit trugs, or tobacco during this pregnancy or prior; denies any hx of anxiety or depression; feels safe at home

## 2021-09-13 NOTE — OB PROVIDER H&P - NSHPPHYSICALEXAM_GEN_ALL_CORE
Vital Signs Last 24 Hrs  T(C): 36.9 (13 Sep 2021 17:36), Max: 36.9 (13 Sep 2021 17:29)  T(F): 98.4 (13 Sep 2021 17:36), Max: 98.42 (13 Sep 2021 17:29)  HR: 72 (13 Sep 2021 17:36) (72 - 72)  BP: 117/65 (13 Sep 2021 17:36) (117/65 - 117/65)  RR: 18 (13 Sep 2021 17:36) (18 - 18)    Gen: NAD  Cardio: RRR  Lungs: CTAB   Abdomen: gravid, nontender to palpation  Ext: nontender lower extremities bilaterally   SVE: 2/50/-3    FHT: baseline 140, moderate variability, +accels, no decels  [place]: [place] q2-3 for an hour now, previously with 2h of inactivity on toco

## 2021-09-13 NOTE — ACTIVE PROBLEMS
[Diabetes Mellitus] : no diabetes mellitus [Hypertension] : no hypertension [Heart Disease] : no heart disease [Autoimmune Disease] : no autoimmune disease [Renal Disease] : no kidney disease, no UTI [Psychiatric Disorders] : no psychiatric disorders [Neurologic Disorder] : no neurologic disorder, no epilepsy [Depression] : no depression, no post partum depression [Hepatic Disorder] : no hepatitis, no liver disease [Thrombophlebitis] : no varicosities, no phlebitis [Thyroid Disorder] : no thyroid dysfunction [Trauma] : no trauma/violence [Blood Transfusion (___ Ml)] : no history of blood transfusion [FreeTextEntry1] : .

## 2021-09-13 NOTE — VITALS
[LMP (date): ___] : LMP was on [unfilled] [JEWELL by LMP (date): ___] : The calculated JEWELL by LMP is [unfilled] [By LMP] : this is the final JEWELL [GA =___ Weeks] : which calculates to a GA of [unfilled] weeks [GA= ___ Days] : and [unfilled] day(s)

## 2021-09-13 NOTE — OB PROVIDER TRIAGE NOTE - NSHPPHYSICALEXAM_GEN_ALL_CORE
Vital Signs Last 24 Hrs  T(C): 36.9 (13 Sep 2021 17:36), Max: 36.9 (13 Sep 2021 17:29)  T(F): 98.4 (13 Sep 2021 17:36), Max: 98.42 (13 Sep 2021 17:29)  HR: 72 (13 Sep 2021 17:36) (72 - 72)  BP: 117/65 (13 Sep 2021 17:36) (117/65 - 117/65)  RR: 18 (13 Sep 2021 17:36) (18 - 18)    Gen: NAD  Cardio: RRR  Lungs: CTAB   Abdomen: gravid, nontender to palpation  Ext: nontender lower extremities bilaterally   SVE: 2/50/-3    FHT: baseline 140, moderate variability, +accels, no decels  [place]: [place] q2-3 for an hour now, previously with 2h of inactivity on toco

## 2021-09-13 NOTE — DISCUSSION/SUMMARY
[FreeTextEntry1] : At the time of consultation, we discussed the various maternal, fetal and  complications of gestational diabetes, including fetal growth abnormality, polyhydramnios,  labor/delivery, fetal distress, unexplained stillbirth, labor complications including shoulder dystocia and increased risk of  section and preeclampsia.  We also discussed the increased rate of NICU admission, delayed lung maturity,  hypoglycemia and other metabolic disturbances.  All of these complications are increased in the setting of suboptimal glucose control. She is aware that optimal glycemic control can help minimize these risks, but cannot eliminate them completely. Gloria has been following a carbohydrate consistent diet and logging BG values.  Review of available BG log reveals the majority of values outside of target range.  There are intermittent elevations in her postprandial values.  We reviewed target BG values in pregnancy, with the goal to maintain fasting blood sugars less than 90 and one hour postprandial values less than 140 with no value less than 60 and no value greater than 200. The importance of dietary compliance and regular exercise was reviewed. Given her persistent fasting hyperglycemia, medical therapy is recommended.  We reviewed the option of oral therapy or insulin, with insulin being preferred.  After counseling, Gloria opted to being insulin therapy and a prescription for Humulin N 12 units QHS was sent to her pharmacy.  Insulin instruction was provided and the signs and symptoms of hypoglycemia were reviewed.  \par \par Recent ultrasound showed an AGA fetus with normal amniotic fluid volume and BPP.  Recommendations for fetal testing include serial assessment of fetal growth and weekly  testing at 32 weeks.  Delivery between 39 - 39  is recommended.  Finally, we discussed the importance of repeat GTT 6-12 weeks after delivery in order to test for persistent glucose intolerance outside of pregnancy, especially in light of her borderline HgA1c value.  The patient voiced understanding of the above recommendations and counseling.  Gloria has a follow up appointment with diabetic education on 21 and will follow up with your office as scheduled.\par \par Regarding her short cervix, we reviewed today's ultrasound findings.  She was advised to continue vaginal progesterone therapy and  labor precautions were reviewed.  Given her current gestational age, no further cervical surveillance is recommended unless the patient has clinical symptoms.  We reviewed the finding of an anterior EDUIN fibroid and potential risk to pregnancy. Most pregnant women with fibroids do not have any complications during pregnancy related to the fibroids. Pain is the most common problem, but there may also be a slightly increased risk of obstetrical complications such as miscarriage, premature labor and delivery, abnormal fetal position, and placental abruption.  Gloria may also be at increased risk for complication at the time of repeat  delivery and intrapartum/postpartum atony and hemorrhage.  As the uterus enlarges, the fibroids may cause compression of adjacent organs with subsequent constipation or urinary frequency, inability to tolerate large meals and in rare cases bowel obstruction.  She was advised to report any concerning symptoms for prompt evaluation.  Serial growth evaluation is again recommended.

## 2021-09-13 NOTE — OB PROVIDER TRIAGE NOTE - HISTORY OF PRESENT ILLNESS
36 y/o  at 28w3d (, consistent with first trimester sono, JEWELL 12/3) presenting from office where she was noted to have contractions q3m on monitor and was found to be 1cm dilated. In triage she reports no lower abdominal pain/cramping, however she says she never feels pain or cramping with contractions, her only symptom consistent with contractions is SOB at time of contraction, she says she barely feels any SOB today in triage but has intermittently felt it during her pregnancy. Denies leakage of fluid or vaginal bleeding. +FM   Prenatal course significant for:   1. GDMA2, starting on Humulin 12u HS today, hasnt started yet   2. cervical funneling and short cervical length in this pregnancy, currently on vaginal progesterone and s/p Indocin 7d course within the last month for symtpomatic  contractions +  3. fibroid uterus in pregnancy, 6aaq3wk EDUIN left sided fibroid, symptomatic, s/p indocin   4. hx of C/S x1, 2019    ObHx:   G1: 2019, pCS, 39w, arrest of descent in 2nd stage of labor, otherwise uncomplicated   G2: , chemical pregnancy, without subsequent D&C   GynHx:  Hx of abnormal pap; denies history of endometriosis, or ovarian cysts; denies history of STD's   PMH/PSH: THELMA    Meds: PNV, Humulin 12u HS (new, started today)   Allergies: NKDA   SocHx: denies use of EtOH, illicit trugs, or tobacco during this pregnancy or prior; denies any hx of anxiety or depression; feels safe at home

## 2021-09-13 NOTE — REVIEW OF SYSTEMS
[Fever] : no fever [Feeling Tired] : feeling tired [Sight Problems] : no sight problems [Chest Pain] : no chest pain [Dyspnea] : no shortness of breath [Abdominal Pain] : abdominal pain [Vomiting] : no vomiting [Pelvic Pain] : no pelvic pain [Abn Vag Bleeding] : no abnormal vaginal bleeding [Frequency] : no frequency [Arthralgias] : no arthralgias [Breast Pain] : no breast pain [Headache] : no headache [Anxiety] : no anxiety [Easy Bleeding] : does not bleed easily [Easy Bruising] : does not bruise easily

## 2021-09-13 NOTE — PHYSICAL EXAM
[FreeTextEntry1] : Patient presents as a well appearing gravid female.  Ambulates without difficulty.  Answers questions appropriately without cough or difficulty breathing. There is no peripheral edema or visible rash.  FHR noted on ultrasound.

## 2021-09-13 NOTE — OB PROVIDER TRIAGE NOTE - NSPREVIOUSLIVEBIRTHSNOWDEAD_OBGYN_ALL_OB_NU
Subjective   Patient ID: Malka is a 62 year old female.    Chief Complaint   Patient presents with   • Office Visit   • Follow-up   • Allergies     per pt states after her second covid shot she noticed her allergies have become worst       HPI  Has a history of allergies  Had second covid shot on the 20th of March    Has a lot of sneezing  Watery, runny nose    Has tried over the counter antihistamines and they do no twork  Asking if there is a prescription available  Benadryl makes her too drowsy      Patient's medications, allergies, past medical, surgical, social and family histories were reviewed and updated as appropriate.    ROS  Review of Systems   Constitutional: Negative for chills and fever.   HENT: Positive for congestion, postnasal drip and sneezing. Negative for sinus pressure and sinus pain.    Respiratory: Negative for cough and shortness of breath.    Skin: Negative for rash.         Objective   Visit Vitals  /76 (BP Location: LUE - Left upper extremity, Patient Position: Sitting)   Pulse 76   Temp 98.8 °F (37.1 °C) (Temporal)   Resp 20   Wt 61.2 kg (135 lb)   BMI 24.69 kg/m²      Wt Readings from Last 3 Encounters:   04/07/21 61.2 kg (135 lb)   02/20/21 60.4 kg (133 lb 2.5 oz)   02/02/21 60.3 kg (133 lb)       Physical Exam  Vitals reviewed.   Constitutional:       Appearance: Normal appearance. She is not ill-appearing.   Pulmonary:      Effort: Pulmonary effort is normal.   Neurological:      Mental Status: She is alert and oriented to person, place, and time.   Psychiatric:         Mood and Affect: Mood normal.         Behavior: Behavior normal.         Lab Results Reviewed     ASSESSMENT AND PLAN    This is a 62 year old year-old female who presents with    Problem List Items Addressed This Visit        Respiratory    Seasonal allergic rhinitis due to pollen - Primary     rx provided for clarinex and flonase  Do not suspect due to vaccine         Relevant Medications    fluticasone  (FLONASE) 50 MCG/ACT nasal spray    desloratadine (CLARINEX) 5 MG tablet      Other Visit Diagnoses     Other decreased white blood cell (WBC) count        repeat cbc with diff today to monitor trend    Relevant Orders    CBC WITH DIFFERENTIAL          Return if symptoms worsen or fail to improve.    Instructions provided as documented in the AVS.    The patient indicated understanding of the diagnosis and agreed with the plan of care.  Refer to orders.   0

## 2021-09-13 NOTE — CHIEF COMPLAINT
[G ___] : G [unfilled] [P ___] : P [unfilled] [de-identified] : GDM, short cervix on vaginal progesterone therapy, and uterine fibroid

## 2021-09-13 NOTE — DATA REVIEWED
[FreeTextEntry1] : 5/5/21 - HgA1c 5.6%\par \par 8/17/21 - \par \par BG log review 9/1-9/9\par Fasting 100, 96, 96, 92, 90, 92, 86\par 1 hour breakfast 72, 113, 92, 177, 87, 133\par 1 hour lunch 135, 118, 122, 160, 153\par 1 hour dinner 124, 89, 147\par \par 8/26/21 - normal fetal echocardiogram\par \par 9/1/21 - TAUS/TVUS\par cephalic, anterior placenta,  gm (16%), AC 30%, MONAE 21.6 cm, BPP 8/8, CL 1.7 cm, \par \par 9/9/21 - TAUS/TVUS\par cephalic, anterior placenta, MONAE 12.28 cm, BPP 8/8, anterior EDUIN myoma measuring 8.3 x 9.1 x 8.5 cm, CL 1.4 cm, persistent EDUIN contracture\par \par

## 2021-09-13 NOTE — OB PROVIDER H&P - ASSESSMENT
34 y/o  at 28w3d evaluated for labor.     1. r/o  labor   - TVUS 2.6cm in office this morning, cervical exam 1cm in office, now 2cm   - [place]: now with episode of  contractions q2-3m, for 1hr now   - no OB symptoms, subjectively, not feeling contractions   - FFN positive   A: in setting of cervical change, examiner variance vs true  labor possible, cannot exclude true  labor   P: will treat as  labor until re-evaluation   - ACS   - Ampicillin GBS prophylaxis   - MgSO4 for neuroprophylaxis   - Indocin for tocolysis, 48hrs max   - pancultures, r/o infectious reasons for  labor/contractions   - PDA sono tomorrow     2. GMDA2   - Insulin prescribed and set to start today as per Dr. Case   P:   - will continue Insulin 12u HS today   - will add sliding scale   - anticipate uncontrolled hyperglycemia 2/2 ACS   - will re-evaluate tomorrow     3. fibroid uterus   - symptomatic in pregnancy   - Indocin 7d course  with improvement   - fibroid size stable   A: symptomatic fibroid uterus in pregnancy, cannot exclude degenerating myoma   P: re-treat with Indocin for this + possible  labor  - will re-evaluate after course     d/w Dr. Foster (OB Hospitalist On-Call)   d/w Dr. Freeman ([facility] On-Call)   d/w Dr. Tolliver ([facility] On-Call)

## 2021-09-13 NOTE — CHART NOTE - NSCHARTNOTEFT_GEN_A_CORE
Patient re-assessed at bedside.  Patient resting comfortably.  Denies any contraction like pain.  Reporting good fetal movement.    ICU Vital Signs Last 24 Hrs  T(C): 36.9 (13 Sep 2021 17:36), Max: 36.9 (13 Sep 2021 17:29)  T(F): 98.4 (13 Sep 2021 17:36), Max: 98.42 (13 Sep 2021 17:29)  HR: 78 (13 Sep 2021 23:54) (71 - 81)  BP: 117/65 (13 Sep 2021 17:36) (117/65 - 117/65)  RR: 18 (13 Sep 2021 17:36) (18 - 18)  SpO2: 99% (13 Sep 2021 23:49) (97% - 100%)    FHT: 135 bpm, moderate variability   [place]: No Ctx noted  SVE: 2.0/60/-3      Plan:  - VSS  - Vaginal cultures obtained  - No cervical change noted since 2100  - S/P betamethasone 12mg x 1 @ 2234  - Indocin for tocolysis, 48hrs max   - Continuous FHT/[place]  - Maternal/fetal status reassuring

## 2021-09-13 NOTE — CONSULT NOTE ADULT - ATTENDING COMMENTS
Patient with  labor and other comorbidities during pregnancy. Requires hospitalization for treatment of  labor and other comorbidities.

## 2021-09-13 NOTE — CONSULT NOTE ADULT - ASSESSMENT
36 y/o  at 28w3d evaluated for labor.     1. r/o  labor   - TVUS 2.6cm in office this morning, cervical exam 1cm in office, now 2cm   - [place]: now with episode of  contractions q2-3m, for 1hr now   - no OB symptoms, subjectively, not feeling contractions   - FFN positive   A: in setting of cervical change, examiner variance vs true  labor possible, cannot exclude true  labor   P: will treat as  labor until re-evaluation   - ACS   - Ampicillin GBS prophylaxis   - MgSO4 for neuroprophylaxis   - Indocin for tocolysis, 48hrs max   - pancultures, r/o infectious reasons for  labor/contractions   - PDA sono tomorrow     2. GMDA2   - Insulin prescribed and set to start today as per Dr. Case   P:   - will continue Insulin 12u HS today   - will add sliding scale   - anticipate uncontrolled hyperglycemia 2/2 ACS   - will re-evaluate tomorrow     3. fibroid uterus   - symptomatic in pregnancy   - Indocin 7d course  with improvement   - fibroid size stable   A: symptomatic fibroid uterus in pregnancy, cannot exclude degenerating myoma   P: re-treat with Indocin for this + possible  labor  - will re-evaluate after course     d/w Dr. Foster (OB Hospitalist On-Call)   d/w Dr. Freeman ([facility] On-Call)   d/w Dr. Tolliver ([facility] On-Call)

## 2021-09-13 NOTE — OB PROVIDER TRIAGE NOTE - NSOBPROVIDERNOTE_OBGYN_ALL_OB_FT
34 y/o  at 28w3d evaluated for labor.   - known symptomatic fibroids during pregnancy possible 2/2 fibroid uterus vs cervical funneling/short cervical length, now s/p Indocin 7d course 1 month ago   - 1cm in office this morning, TVUS CL 2.6cm this morning, FFN sent from office  - FFN resulted positive here, cervical exam here /-3, initially with inactivity on toco for first 2hrs of monitoring period in triage but subsequently entered an episode of  contractions now 2-3m for 1h     d/w Dr. Foster 34 y/o  at 28w3d evaluated for labor.     1. r/o  labor   - TVUS 2.6cm in office this morning, cervical exam 1cm in office, now 2cm   - [place]: now with episode of  contractions q2-3m, for 1hr now   - no OB symptoms, subjectively, not feeling contractions   - FFN positive   A: in setting of cervical change, examiner variance vs true  labor possible, cannot exclude true  labor   P: will treat as  labor until re-evaluation   - ACS   - Ampicillin GBS prophylaxis   - MgSO4 for neuroprophylaxis   - Indocin for tocolysis, 48hrs max   - pancultures, r/o infectious reasons for  labor/contractions   - PDA sono tomorrow     2. GMDA2   - Insulin prescribed and set to start today as per Dr. Case   P:   - will continue Insulin 12u HS today   - will add sliding scale   - anticipate uncontrolled hyperglycemia 2/2 ACS   - will re-evaluate tomorrow     3. fibroid uterus   - symptomatic in pregnancy   - Indocin 7d course  with improvement   - fibroid size stable   A: symptomatic fibroid uterus in pregnancy, cannot exclude degenerating myoma   P: re-treat with Indocin for this + possible  labor  - will re-evaluate after course     d/w Dr. Foster (OB Hospitalist On-Call)   d/w Dr. Freeman ([facility] OB On-Call)   d/w Dr. Tolliver ([facility] On-Call)

## 2021-09-13 NOTE — OB HISTORY
[Pregnancy History] : patient received anesthesia [LMP: ___] : LMP: [unfilled] [JEWELL: ___] : JEWELL: [unfilled] [Spontaneous] : Spontaneous conception [Sonogram] : sonogram [at ___ wks] : at [unfilled] weeks [Definite:  ___ (Date)] : the last menstrual period was [unfilled] [___] : Multiple Births: [unfilled] [EGA: ___ wks] : EGA: [unfilled] wks [FreeTextEntry1] : Previous MFM consultation for short cervix - initiated vaginal progesterone therapy\par Initial diabetic education 8/26/21

## 2021-09-13 NOTE — OB RN TRIAGE NOTE - SUICIDE SCREENING DEPRESSION
Colonoscopy and Dexa received uploaded to . Sent to [facility]      Negative

## 2021-09-13 NOTE — HISTORY OF PRESENT ILLNESS
[FreeTextEntry1] : Gloria Peck is a 35 y.o.  with LMP of 21 and JEWELL of 12/3/21 who presents at 27w6d for  consultation secondary to pregnancy complicated by GDM.  Her BMI is 30.91 kg/m2. She was previously seen for consultation on 21 to discuss management of a short cervix and uterine fibroid noted on ultrasound. She has been on vaginal progesterone therapy since that time.  She reports irregular cramping and lower abdominal pressure but denies LOF or vaginal bleeding and confirms fetal movement.  Regarding her GDM, she has had diabetic education and nutritional counseling and reports compliance with dietary recommendations.  She has been logging fasting and 1 hour postprandial BG values without complication.

## 2021-09-13 NOTE — CONSULT NOTE ADULT - SUBJECTIVE AND OBJECTIVE BOX
34 y/o  at 28w3d (, consistent with first trimester sono, JEWELL 12/3) presenting from office where she was noted to have contractions q3m on monitor and was found to be 1cm dilated. In triage she reports no lower abdominal pain/cramping, however she says she never feels pain or cramping with contractions, her only symptom consistent with contractions is SOB at time of contraction, she says she barely feels any SOB today in triage but has intermittently felt it during her pregnancy. Denies leakage of fluid or vaginal bleeding. +FM   Prenatal course significant for:   1. GDMA2, starting on Humulin 12u HS today, hasnt started yet   2. cervical funneling and short cervical length in this pregnancy, currently on vaginal progesterone and s/p Indocin 7d course within the last month for symtpomatic  contractions +  3. fibroid uterus in pregnancy, [id number] EDUIN left sided fibroid, symptomatic, s/p indocin   4. hx of C/S x1,     ObHx:   G1: 2019, pCS, 39w, arrest of descent in 2nd stage of labor, otherwise uncomplicated   G2: , chemical pregnancy, without subsequent D&C   GynHx:  Hx of abnormal pap; denies history of endometriosis, or ovarian cysts; denies history of STD's   PMH/PSH: THELMA    Meds: PNV, Humulin 12u HS (new, started today)   Allergies: NKDA   SocHx: denies use of EtOH, illicit trugs, or tobacco during this pregnancy or prior; denies any hx of anxiety or depression; feels safe at home     Vital Signs Last 24 Hrs  T(C): 36.9 (13 Sep 2021 17:36), Max: 36.9 (13 Sep 2021 17:29)  T(F): 98.4 (13 Sep 2021 17:36), Max: 98.42 (13 Sep 2021 17:29)  HR: 72 (13 Sep 2021 17:36) (72 - 72)  BP: 117/65 (13 Sep 2021 17:36) (117/65 - 117/65)  RR: 18 (13 Sep 2021 17:36) (18 - 18)    Gen: NAD  Cardio: RRR  Lungs: CTAB   Abdomen: gravid, nontender to palpation  Ext: nontender lower extremities bilaterally   SVE: 2/50/-3    FHT: baseline 140, moderate variability, +accels, no decels  [place]: [place] q2-3 for an hour now, previously with 2h of inactivity on toco 36 y/o  at 28w3d (LMP 21, consistent with first trimester sono, JEWELL 12/3/21) presenting from office where she was noted to have contractions q3m on monitor and was found to be 1cm dilated. In triage she reports no lower abdominal pain/cramping, however, she says she never feels pain or cramping with contractions. Her only symptom consistent with contractions is SOB at time of contraction.  Denies leakage of fluid or vaginal bleeding. +FM   Prenatal course significant for:   1. GDMA2, given Rx for Humulin 12u HS, hasn't started yet   2. Cervical funneling and short cervical length in this pregnancy, currently on vaginal progesterone and s/p Indocin 7d course within the last month for symtpomatic  contractions +  3. Fibroid uterus in pregnancy, 8 x 8 cm EDUIN left sided fibroid, symptomatic, s/p indomthacin Rx  4. hx of C/S x1,     ObHx:   G1: 2019, pCS, 39w, arrest of descent in 2nd stage of labor, otherwise uncomplicated   G2: , chemical pregnancy, without subsequent D&C   GynHx:  Hx of abnormal pap; denies history of endometriosis, or ovarian cysts; denies history of STD's   PMH/PSH: THELMA    Meds: PNV, Humulin 12u HS (new, started today)   Allergies: NKDA   SocHx: denies use of EtOH, illicit trugs, or tobacco during this pregnancy or prior; denies any hx of anxiety or depression; feels safe at home     Vital Signs Last 24 Hrs  T(C): 36.9 (13 Sep 2021 17:36), Max: 36.9 (13 Sep 2021 17:29)  T(F): 98.4 (13 Sep 2021 17:36), Max: 98.42 (13 Sep 2021 17:29)  HR: 72 (13 Sep 2021 17:36) (72 - 72)  BP: 117/65 (13 Sep 2021 17:36) (117/65 - 117/65)  RR: 18 (13 Sep 2021 17:36) (18 - 18)    Gen: NAD  Cardio: RRR  Lungs: CTAB   Abdomen: gravid, nontender to palpation  Ext: nontender lower extremities bilaterally   SVE: 2/50/-3    FHT: baseline 140, moderate variability, +accels, no decels  [place]: [place] q2-3 for an hour now, previously with 2h of inactivity on toco

## 2021-09-14 LAB
C TRACH RRNA SPEC QL NAA+PROBE: SIGNIFICANT CHANGE UP
CANDIDA AB TITR SER: SIGNIFICANT CHANGE UP
COVID-19 SPIKE DOMAIN AB INTERP: POSITIVE
COVID-19 SPIKE DOMAIN ANTIBODY RESULT: >250 U/ML — HIGH
G VAGINALIS DNA SPEC QL NAA+PROBE: SIGNIFICANT CHANGE UP
GLUCOSE BLDC GLUCOMTR-MCNC: 100 MG/DL — HIGH (ref 70–99)
GLUCOSE BLDC GLUCOMTR-MCNC: 116 MG/DL — HIGH (ref 70–99)
GLUCOSE BLDC GLUCOMTR-MCNC: 132 MG/DL — HIGH (ref 70–99)
GLUCOSE BLDC GLUCOMTR-MCNC: 133 MG/DL — HIGH (ref 70–99)
GLUCOSE BLDC GLUCOMTR-MCNC: 137 MG/DL — HIGH (ref 70–99)
GLUCOSE BLDC GLUCOMTR-MCNC: 92 MG/DL — SIGNIFICANT CHANGE UP (ref 70–99)
MAGNESIUM SERPL-MCNC: 4.8 MG/DL — HIGH (ref 1.6–2.6)
MAGNESIUM SERPL-MCNC: 5.9 MG/DL — HIGH (ref 1.6–2.6)
MAGNESIUM SERPL-MCNC: 6.4 MG/DL — HIGH (ref 1.6–2.6)
N GONORRHOEA RRNA SPEC QL NAA+PROBE: SIGNIFICANT CHANGE UP
SARS-COV-2 IGG+IGM SERPL QL IA: >250 U/ML — HIGH
SARS-COV-2 IGG+IGM SERPL QL IA: POSITIVE
SARS-COV-2 RNA SPEC QL NAA+PROBE: SIGNIFICANT CHANGE UP
SPECIMEN SOURCE: SIGNIFICANT CHANGE UP
T PALLIDUM AB TITR SER: NEGATIVE — SIGNIFICANT CHANGE UP
T VAGINALIS SPEC QL WET PREP: SIGNIFICANT CHANGE UP

## 2021-09-14 PROCEDURE — 76819 FETAL BIOPHYS PROFIL W/O NST: CPT | Mod: 26

## 2021-09-14 PROCEDURE — 99233 SBSQ HOSP IP/OBS HIGH 50: CPT | Mod: GC

## 2021-09-14 PROCEDURE — 76820 UMBILICAL ARTERY ECHO: CPT | Mod: 26

## 2021-09-14 PROCEDURE — 76815 OB US LIMITED FETUS(S): CPT | Mod: 26

## 2021-09-14 RX ORDER — INSULIN LISPRO 100/ML
VIAL (ML) SUBCUTANEOUS
Refills: 0 | Status: DISCONTINUED | OUTPATIENT
Start: 2021-09-14 | End: 2021-09-16

## 2021-09-14 RX ADMIN — Medication 108 GRAM(S): at 14:26

## 2021-09-14 RX ADMIN — Medication 108 GRAM(S): at 17:50

## 2021-09-14 RX ADMIN — Medication 25 MILLIGRAM(S): at 17:50

## 2021-09-14 RX ADMIN — Medication 25 MILLIGRAM(S): at 12:22

## 2021-09-14 RX ADMIN — SODIUM CHLORIDE 50 MILLILITER(S): 9 INJECTION, SOLUTION INTRAVENOUS at 08:15

## 2021-09-14 RX ADMIN — SODIUM CHLORIDE 50 MILLILITER(S): 9 INJECTION, SOLUTION INTRAVENOUS at 17:50

## 2021-09-14 RX ADMIN — Medication 108 GRAM(S): at 22:03

## 2021-09-14 RX ADMIN — Medication 108 GRAM(S): at 06:01

## 2021-09-14 RX ADMIN — Medication 12 MILLIGRAM(S): at 22:44

## 2021-09-14 RX ADMIN — Medication 50 GM/HR: at 08:14

## 2021-09-14 RX ADMIN — Medication 108 GRAM(S): at 02:11

## 2021-09-14 RX ADMIN — Medication 50 GM/HR: at 17:49

## 2021-09-14 RX ADMIN — Medication 108 GRAM(S): at 10:16

## 2021-09-14 RX ADMIN — HUMAN INSULIN 12 UNIT(S): 100 INJECTION, SUSPENSION SUBCUTANEOUS at 22:06

## 2021-09-14 RX ADMIN — Medication 1 MILLIGRAM(S): at 11:52

## 2021-09-14 RX ADMIN — Medication 25 MILLIGRAM(S): at 11:52

## 2021-09-14 RX ADMIN — Medication 25 MILLIGRAM(S): at 06:01

## 2021-09-14 RX ADMIN — Medication 50 MILLIGRAM(S): at 00:18

## 2021-09-14 RX ADMIN — Medication 2: at 12:09

## 2021-09-14 RX ADMIN — Medication 25 MILLIGRAM(S): at 17:55

## 2021-09-14 RX ADMIN — Medication 1 TABLET(S): at 11:55

## 2021-09-14 NOTE — PROGRESS NOTE ADULT - PROBLEM SELECTOR PLAN 1
- TVUS 2.6cm in office this morning, cervical exam 1cm in office, now 2cm   - [place]: now with episode of  contractions q2-3m, for 1hr now   - no OB symptoms, subjectively, not feeling contractions   - FFN positive   A: in setting of cervical change, examiner variance vs true  labor possible, cannot exclude true  labor   P: will treat as  labor until re-evaluation   - ACS   - Ampicillin GBS prophylaxis   - MgSO4 for neuroprophylaxis   - Indocin for tocolysis, 48hrs max   - pancultures, r/o infectious reasons for  labor/contractions   - PDA sono tomorrow

## 2021-09-14 NOTE — PROGRESS NOTE ADULT - ASSESSMENT
36 y/o  at 28w3d evaluated to rule out  labor HD#1        discussed with attending Dr Case and Dr Miller

## 2021-09-14 NOTE — PROGRESS NOTE ADULT - PROBLEM SELECTOR PLAN 6
- Insulin prescribed and set to start today as per Dr. Case   P:   - will continue Insulin 12u HS today   - will add sliding scale   - anticipate uncontrolled hyperglycemia 2/2 ACS   - will re-evaluate tomorrow

## 2021-09-14 NOTE — PROGRESS NOTE ADULT - SUBJECTIVE AND OBJECTIVE BOX
Patient is a  at 28w4d today HD#1 admitted for  contractions    Problems:  1. GDMA2, plan was to start humulin 12U HS but has not had yet  2. cervical funnelling, short cervical length - on vaginal progesterone; s/p indocin 7d course <30 days prior  3. fibroid uterus in pregnancy, [id number] EDUIN left sided fibroid, symptomatic, s/p indocin   4. hx of C/S x1,     Patient examined at bedside    Patient reports feeling abdominal tightening and slight shortness of breath when having contractions; without pain  contractions now more sporadic  Resting comfortable  Reports good fetal movement  Reports slight dizziness with magnesium    Vital Signs Last 24 Hrs  T(C): 36.9 (13 Sep 2021 23:58), Max: 36.9 (13 Sep 2021 17:29)  T(F): 98.4 (13 Sep 2021 23:58), Max: 98.42 (13 Sep 2021 17:29)  HR: 79 (14 Sep 2021 02:09) (70 - 99)  BP: 117/58 (14 Sep 2021 00:07) (117/58 - 117/65)  RR: 18 (13 Sep 2021 23:58) (18 - 18)  SpO2: 97% (14 Sep 2021 02:09) (96% - 100%)      Gen: no acute distress  CV: RRR  Pulm: clear bilaterally to auscultation  Abd: nontender, gravid

## 2021-09-15 DIAGNOSIS — Z3A.28 28 WEEKS GESTATION OF PREGNANCY: ICD-10-CM

## 2021-09-15 LAB
CULTURE RESULTS: SIGNIFICANT CHANGE UP
GLUCOSE BLDC GLUCOMTR-MCNC: 105 MG/DL — HIGH (ref 70–99)
GLUCOSE BLDC GLUCOMTR-MCNC: 130 MG/DL — HIGH (ref 70–99)
GLUCOSE BLDC GLUCOMTR-MCNC: 137 MG/DL — HIGH (ref 70–99)
GLUCOSE BLDC GLUCOMTR-MCNC: 137 MG/DL — HIGH (ref 70–99)
GLUCOSE BLDC GLUCOMTR-MCNC: 154 MG/DL — HIGH (ref 70–99)
GLUCOSE BLDC GLUCOMTR-MCNC: 89 MG/DL — SIGNIFICANT CHANGE UP (ref 70–99)
GROUP B BETA STREP DNA (PCR): SIGNIFICANT CHANGE UP
GROUP B BETA STREP INTERPRETATION: SIGNIFICANT CHANGE UP
SOURCE GROUP B STREP: SIGNIFICANT CHANGE UP
SPECIMEN SOURCE: SIGNIFICANT CHANGE UP

## 2021-09-15 PROCEDURE — 99233 SBSQ HOSP IP/OBS HIGH 50: CPT

## 2021-09-15 RX ORDER — INDOMETHACIN 50 MG
25 CAPSULE ORAL EVERY 6 HOURS
Refills: 0 | Status: COMPLETED | OUTPATIENT
Start: 2021-09-15 | End: 2021-09-16

## 2021-09-15 RX ORDER — HUMAN INSULIN 100 [IU]/ML
16 INJECTION, SUSPENSION SUBCUTANEOUS AT BEDTIME
Refills: 0 | Status: DISCONTINUED | OUTPATIENT
Start: 2021-09-15 | End: 2021-09-16

## 2021-09-15 RX ADMIN — HUMAN INSULIN 16 UNIT(S): 100 INJECTION, SUSPENSION SUBCUTANEOUS at 22:55

## 2021-09-15 RX ADMIN — Medication 2: at 09:23

## 2021-09-15 RX ADMIN — Medication 25 MILLIGRAM(S): at 17:48

## 2021-09-15 RX ADMIN — Medication 2: at 17:41

## 2021-09-15 RX ADMIN — Medication 25 MILLIGRAM(S): at 23:26

## 2021-09-15 NOTE — PROGRESS NOTE ADULT - ASSESSMENT
34 y/o  at 28w5d evaluated to rule out  labor HD#3        discussed with attending Dr Case and Dr Miller 34 y/o  at 28w5d evaluated to rule out  labor HD#3

## 2021-09-15 NOTE — PROGRESS NOTE ADULT - PROBLEM SELECTOR PLAN 1
- Cervical length: 2.6cm > 1cm in office > 2.6cm > same today.  - Cervical exam: 2/50/-3 for 3 exams. Exam today closed, 50%, posterior.  - [place]: No ctxs felt today, rare ctxs on NST noted which patient does not feel  - FFN positive on 9/13.  - Indocin for an additional 24h to be completed - PDA doppler wnl  - Vaginal and urine culture completed - f/u results - Cervical length: 2.6cm > 14cm in office > 2.6cm > no change yesterday 9/14  - Cervical exam: 2/50/-3 for 3 exams. speculum exam today closed, 50%, posterior.  - [place]: No ctxs felt today, rare ctxs on NST noted which patient does not feel  - FFN positive on 9/13.  - Indocin for an additional 24h to be completed - PDA doppler wnl  - Vaginal and urine culture negative 9/14 - Cervical length: 2.6cm > 14cm in office > 2.6cm > no change yesterday 9/14  - Cervical exam: 2/50/-3 for 3 exams. speculum exam today closed, 50%, posterior.  - [place]: No ctxs felt today, rare ctxs on NST noted which patient does not feel  - FFN positive on 9/13.  - Indocin for an additional 24h to be completed - DA Doppler wnl  - Vaginal and urine culture negative 9/14

## 2021-09-15 NOTE — PROGRESS NOTE ADULT - PROBLEM SELECTOR PLAN 2
- Insulin prescribed and set to start today as per Dr. Case   P:   - will continue Insulin 12u HS today   - will add sliding scale   - anticipate uncontrolled hyperglycemia 2/2 ACS   - will re-evaluate tomorrow - will continue Insulin 12u HS  - fasting / prebreakfast glucose elevated to 137, and received 2U insulin; has not required since   -anticipated hyperglycemia likely 2/2 ACS, 2nd dose of betamethasone 9/14 at approx 2230pm  - continue ISS with tight glycemic control:  2U if glucose 100-150  4U if glucose 151-200  6U if glucose 201-250  8U if glucose 251-300  10U if glucose 301-350  - will re-evaluate tomorrow

## 2021-09-15 NOTE — PROGRESS NOTE ADULT - SUBJECTIVE AND OBJECTIVE BOX
Patient is a  at 28w5d today HD#3 admitted for  contractions    Problems:  1. GDMA2, plan was to start humulin 12U HS but has not had yet  2. cervical funnelling, short cervical length - on vaginal progesterone; s/p indocin 7d course <30 days prior  3. fibroid uterus in pregnancy, [id number] EDUIN left sided fibroid, symptomatic, s/p indocin   4. hx of C/S x1,     Patient examined at bedside    Patient reports no pain, no leakage of fluid, no vaginal bleeding  Resting comfortable with no concerns  Reports good fetal movement    Vital Signs Last 24 Hrs  T(C): 37 (15 Sep 2021 09:30), Max: 37 (15 Sep 2021 09:30)  T(F): 98.6 (15 Sep 2021 09:30), Max: 98.6 (15 Sep 2021 09:30)  HR: 76 (15 Sep 2021 09:30) (68 - 101)  BP: 119/75 (15 Sep 2021 09:30) (96/55 - 125/74)  BP(mean): --  RR: 18 (15 Sep 2021 09:30) (16 - 18)  SpO2: 96% (15 Sep 2021 09:30) (94% - 100%)    Gen: no acute distress  CV: RRR  Pulm: clear bilaterally to auscultation  Abd: nontender, gravid  SVE: On speculum cervix difficult to visualize due to leukorrhea. On vaginal exam cervix is closed, high and approximately 50% effaced       Patient is a  at 28w5d today HD#3 admitted for  contractions    Problems:  1. GDMA2, plan was to start humulin 12U HS but has not had yet  2. cervical funnelling, short cervical length - on vaginal progesterone; s/p indocin 7d course <30 days prior  3. fibroid uterus in pregnancy, [id number] EDUIN left sided fibroid, symptomatic, s/p indocin   4. hx of C/S x1,     Patient examined at bedside    Patient reports no pain, no leakage of fluid, no vaginal bleeding  Resting comfortable with no concerns  Ambulating to bathroom and back without issue  Reports good fetal movement    Vital Signs Last 24 Hrs  T(C): 37 (15 Sep 2021 09:30), Max: 37 (15 Sep 2021 09:30)  T(F): 98.6 (15 Sep 2021 09:30), Max: 98.6 (15 Sep 2021 09:30)  HR: 76 (15 Sep 2021 09:30) (68 - 101)  BP: 119/75 (15 Sep 2021 09:30) (96/55 - 125/74)  RR: 18 (15 Sep 2021 09:30) (16 - 18)  SpO2: 96% (15 Sep 2021 09:30) (94% - 100%)    Gen: no acute distress  CV: RRR  Pulm: clear bilaterally to auscultation  Abd: nontender, gravid  SVE: On speculum cervix difficult to visualize due to leukorrhea. On vaginal exam cervix is closed, high and approximately 50% effaced

## 2021-09-16 ENCOUNTER — TRANSCRIPTION ENCOUNTER (OUTPATIENT)
Age: 36
End: 2021-09-16

## 2021-09-16 VITALS
TEMPERATURE: 98 F | DIASTOLIC BLOOD PRESSURE: 69 MMHG | RESPIRATION RATE: 18 BRPM | SYSTOLIC BLOOD PRESSURE: 110 MMHG | HEART RATE: 76 BPM

## 2021-09-16 DIAGNOSIS — N88.3 INCOMPETENCE OF CERVIX UTERI: ICD-10-CM

## 2021-09-16 DIAGNOSIS — E66.9 OBESITY, UNSPECIFIED: ICD-10-CM

## 2021-09-16 LAB
GLUCOSE BLDC GLUCOMTR-MCNC: 110 MG/DL — HIGH (ref 70–99)
GLUCOSE BLDC GLUCOMTR-MCNC: 124 MG/DL — HIGH (ref 70–99)
GLUCOSE BLDC GLUCOMTR-MCNC: 79 MG/DL — SIGNIFICANT CHANGE UP (ref 70–99)

## 2021-09-16 PROCEDURE — 83735 ASSAY OF MAGNESIUM: CPT

## 2021-09-16 PROCEDURE — 86769 SARS-COV-2 COVID-19 ANTIBODY: CPT

## 2021-09-16 PROCEDURE — 87086 URINE CULTURE/COLONY COUNT: CPT

## 2021-09-16 PROCEDURE — 86901 BLOOD TYPING SEROLOGIC RH(D): CPT

## 2021-09-16 PROCEDURE — 99234 HOSP IP/OBS SM DT SF/LOW 45: CPT

## 2021-09-16 PROCEDURE — 86780 TREPONEMA PALLIDUM: CPT

## 2021-09-16 PROCEDURE — 85025 COMPLETE CBC W/AUTO DIFF WBC: CPT

## 2021-09-16 PROCEDURE — 80053 COMPREHEN METABOLIC PANEL: CPT

## 2021-09-16 PROCEDURE — 87800 DETECT AGNT MULT DNA DIREC: CPT

## 2021-09-16 PROCEDURE — 82962 GLUCOSE BLOOD TEST: CPT

## 2021-09-16 PROCEDURE — U0003: CPT

## 2021-09-16 PROCEDURE — 86900 BLOOD TYPING SEROLOGIC ABO: CPT

## 2021-09-16 PROCEDURE — 82731 ASSAY OF FETAL FIBRONECTIN: CPT

## 2021-09-16 PROCEDURE — 87653 STREP B DNA AMP PROBE: CPT

## 2021-09-16 PROCEDURE — 81001 URINALYSIS AUTO W/SCOPE: CPT

## 2021-09-16 PROCEDURE — 87591 N.GONORRHOEAE DNA AMP PROB: CPT

## 2021-09-16 PROCEDURE — U0005: CPT

## 2021-09-16 PROCEDURE — 86850 RBC ANTIBODY SCREEN: CPT

## 2021-09-16 PROCEDURE — 83036 HEMOGLOBIN GLYCOSYLATED A1C: CPT

## 2021-09-16 PROCEDURE — 36415 COLL VENOUS BLD VENIPUNCTURE: CPT

## 2021-09-16 PROCEDURE — 87491 CHLMYD TRACH DNA AMP PROBE: CPT

## 2021-09-16 RX ORDER — HUMAN INSULIN 100 [IU]/ML
16 INJECTION, SUSPENSION SUBCUTANEOUS
Qty: 480 | Refills: 0
Start: 2021-09-16 | End: 2021-10-15

## 2021-09-16 RX ADMIN — Medication 25 MILLIGRAM(S): at 05:20

## 2021-09-16 RX ADMIN — Medication 1 TABLET(S): at 12:04

## 2021-09-16 RX ADMIN — Medication 25 MILLIGRAM(S): at 12:04

## 2021-09-16 RX ADMIN — Medication 2: at 08:20

## 2021-09-16 NOTE — DISCHARGE NOTE ANTEPARTUM - PRINCIPAL DIAGNOSIS
Gestational diabetes mellitus (GDM) in third trimester Detail Level: Simple Size Of Lesion In Cm (Optional): 0

## 2021-09-16 NOTE — DISCHARGE NOTE ANTEPARTUM - PATIENT PORTAL LINK FT
You can access the FollowMyHealth Patient Portal offered by [facility] by registering at the following website: [email or web address]. By joining [employer]’s FollowMyHealth portal, you will also be able to view your health information using other applications (apps) compatible with our system.

## 2021-09-16 NOTE — DISCHARGE NOTE ANTEPARTUM - HOSPITAL COURSE
Patient admitted for evaluation for  labor. She was given BMZx2 for fetal lung maturity. She has been on indocin. Her GDMA2 has been managed with insulin. She received ampicillin and magnesium for suspected  labor. They were d/c after BMZ complete and she was no longer [place] nor making cervical change. On HD#4 she was feeling well, not feeling contractions, no vaginal bleeding, no loss of fluid. She was deemed stable for d/c home.

## 2021-09-16 NOTE — PROGRESS NOTE ADULT - PROBLEM SELECTOR PLAN 2
- will continue Insulin 12u HS  - fasting / prebreakfast glucose elevated to 137, and received 2U insulin; has not required since   -anticipated hyperglycemia likely 2/2 ACS, 2nd dose of betamethasone 9/14 at approx 2230pm  - continue ISS with tight glycemic control:  2U if glucose 100-150  4U if glucose 151-200  6U if glucose 201-250  8U if glucose 251-300  10U if glucose 301-350  - will re-evaluate tomorrow - Evening NPH increased to 16u  - fasting / prebreakfast glucose 110 today (137, 116 last 2 days)  - Anticipated hyperglycemia likely 2/2 ACS, 2nd dose of betamethasone 9/14 at approx 2230pm  - continue ISS with tight glycemic control:  2U if glucose 100-150  4U if glucose 151-200  6U if glucose 201-250  8U if glucose 251-300  10U if glucose 301-350  - will d/c on NPH 16u at bedtime - Evening NPH increased to 16u  - fasting / prebreakfast glucose 110 today (137, 116 last 2 days)  - Anticipated hyperglycemia likely 2/2 ACS, 2nd dose of betamethasone 9/14 at approx 2230pm  - ISS inpatient with tight glycemic control:  2U if glucose 100-150  4U if glucose 151-200  6U if glucose 201-250  8U if glucose 251-300  10U if glucose 301-350  - will discharge on NPH 16u at bedtime; will not require fast-acting admelog for discharge  -counseled on insulin administration and titration over next week  -encouraged to continue and to bring glucose log/food diary to next appt at [facility] next week

## 2021-09-16 NOTE — PROGRESS NOTE ADULT - PROBLEM SELECTOR PLAN 4
- symptomatic in pregnancy   - Indocin 7d course  with improvement   - fibroid size stable   A: symptomatic fibroid uterus in pregnancy, cannot exclude degenerating myoma   P: re-treat with Indocin for this + possible  labor  - will re-evaluate after course
- Ampicillin GBS prophylaxis, d/c yesterday after BMZ complete  - MgSO4 for neuroprophylaxis, d/c yesterday after BMZ complete  - S/p BMZx2 for fetal lung maturity completed 9/14  - Prenatal vitamins
- Ampicillin GBS prophylaxis, d/c yesterday after BMZ complete  - MgSO4 for neuroprophylaxis, d/c yesterday after BMZ complete  - S/p BMZx2 for fetal lung maturity completed 9/14  - Prenatal vitamins

## 2021-09-16 NOTE — PROGRESS NOTE ADULT - PROBLEM SELECTOR PROBLEM 4
28 weeks gestation of pregnancy
28 weeks gestation of pregnancy
Uterine fibroids affecting pregnancy

## 2021-09-16 NOTE — PROGRESS NOTE ADULT - PROBLEM SELECTOR PROBLEM 3
Uterine fibroids affecting pregnancy
Short cervical length during pregnancy, third trimester
Uterine fibroids affecting pregnancy

## 2021-09-16 NOTE — DISCHARGE NOTE ANTEPARTUM - MEDICATION SUMMARY - MEDICATIONS TO TAKE
I will START or STAY ON the medications listed below when I get home from the hospital:    HumuLIN N KwikPen 100 units/mL subcutaneous suspension  -- 16 unit(s) subcutaneous once a day (at bedtime)   -- Do not drink alcoholic beverages when taking this medication.  It is very important that you take or use this exactly as directed.  Do not skip doses or discontinue unless directed by your doctor.  Keep in refrigerator.  Do not freeze.    -- Indication: For GDMA2

## 2021-09-16 NOTE — PROGRESS NOTE ADULT - ASSESSMENT
36 y/o  at 28w6d by LMP admitted for rule out  labor, HD#4         36 y/o  at 28w6d by LMP admitted for rule out  labor, HD#4    Assessment and plan as below    Dispo: safe for discharge today with return precautions    discussed with attending Dr Tolliver     34 y/o  at 28w6d by LMP.  labor suppressed. HD#4    Assessment and plan as below:    Good condition. Ready for discharge home today. Discharged precautions given.

## 2021-09-16 NOTE — PROGRESS NOTE ADULT - SUBJECTIVE AND OBJECTIVE BOX
Patient is a  at 28w6d by LMP admitted for  contractions, HD#4  LMP: 2021  JEWELL: 12/3/2021    Problems:  1. GDMA2, plan was to start humulin 12U HS but has not had yet  2. cervical funnelling, short cervical length - on vaginal progesterone; s/p indocin 7d course <30 days prior  3. fibroid uterus in pregnancy, [id number] EDUIN left sided fibroid, symptomatic, s/p indocin   4. hx of C/S x1,     Patient examined at bedside    Patient reports no pain, no leakage of fluid, no vaginal bleeding  Resting comfortable with no concerns  Ambulating to bathroom and back without issue  Reports good fetal movement    Vital Signs Last 24 Hrs  T(C): 37 (15 Sep 2021 09:30), Max: 37 (15 Sep 2021 09:30)  T(F): 98.6 (15 Sep 2021 09:30), Max: 98.6 (15 Sep 2021 09:30)  HR: 76 (15 Sep 2021 09:30) (68 - 101)  BP: 119/75 (15 Sep 2021 09:30) (96/55 - 125/74)  RR: 18 (15 Sep 2021 09:30) (16 - 18)  SpO2: 96% (15 Sep 2021 09:30) (94% - 100%)    Gen: no acute distress  CV: RRR  Pulm: clear bilaterally to auscultation  Abd: nontender, gravid  SVE: On speculum cervix difficult to visualize due to leukorrhea. On vaginal exam cervix is closed, high and approximately 50% effaced       Patient is a  at 28w6d by LMP admitted for  contractions, HD#4  LMP: 2021  JEWELL: 12/3/2021    Problems:  1. GDMA2, plan was to start humulin 12U HS but has not had yet  2. cervical funnelling, short cervical length - on vaginal progesterone; s/p indocin 7d course <30 days prior  3. fibroid uterus in pregnancy, 3xyh5br EDUIN left sided fibroid, symptomatic, s/p indocin   4. hx of C/S x1,     Patient examined at bedside    Patient reports no pain, no leakage of fluid, no vaginal bleeding  Resting comfortable with no concerns  Ambulating to bathroom and back without issue  Reports good fetal movement    Vital Signs Last 24 Hrs  T(C): 37 (15 Sep 2021 09:30), Max: 37 (15 Sep 2021 09:30)  T(F): 98.6 (15 Sep 2021 09:30), Max: 98.6 (15 Sep 2021 09:30)  HR: 76 (15 Sep 2021 09:30) (68 - 101)  BP: 119/75 (15 Sep 2021 09:30) (96/55 - 125/74)  RR: 18 (15 Sep 2021 09:30) (16 - 18)  SpO2: 96% (15 Sep 2021 09:30) (94% - 100%)    Gen: no acute distress  CV: RRR  Pulm: clear bilaterally to auscultation  Abd: nontender, gravid  SVE: deferred today; yesterday 9/15 on speculum cervix difficult to visualize due to leukorrhea. On vaginal exam cervix is closed, high and approximately 50% effaced       Patient is a  at 28w6d by LMP. Admitted with  contractions and was treated for having  labor. HD#4  LMP: 2021  JEWELL: 12/3/2021    Problems:  1. GDMA2, plan was to start humulin 12U HS but has not had yet  2. Cervical funnelling, short cervical length - on vaginal progesterone; s/p indocin 7d course <30 days prior  3. Fibroid uterus in pregnancy, [id number] EDUIN left sided fibroid, symptomatic, s/p indocin   4. Hx of C/S x1,     Patient examined at bedside  Patient reports no pain, no leakage of fluid, no vaginal bleeding  Resting comfortable with no concerns  Ambulating to bathroom and back without issue  Reports good fetal movement    Vital Signs Last 24 Hrs  T(C): 37 (15 Sep 2021 09:30), Max: 37 (15 Sep 2021 09:30)  T(F): 98.6 (15 Sep 2021 09:30), Max: 98.6 (15 Sep 2021 09:30)  HR: 76 (15 Sep 2021 09:30) (68 - 101)  BP: 119/75 (15 Sep 2021 09:30) (96/55 - 125/74)  RR: 18 (15 Sep 2021 09:30) (16 - 18)  SpO2: 96% (15 Sep 2021 09:30) (94% - 100%)    Gen: no acute distress  CV: RRR  Pulm: clear bilaterally to auscultation  Abd: nontender, gravid  SVE: deferred today; yesterday 9/15 on speculum cervix difficult to visualize due to leukorrhea. On vaginal exam cervix is closed, high and approximately 50% effaced

## 2021-09-16 NOTE — PROGRESS NOTE ADULT - PROBLEM SELECTOR PLAN 1
- LMP: 2/26/2021  - JEWELL: 12/3/2021 by LMP  - Cervical length: 2.6cm > 1.4cm in office > 2.6cm > 2.81cm abdominally on 9/15  - Cervical exam: 2/50/-3 for 3 exams. speculum exam yesterday closed, 50%, posterior.  - [place]: No ctxs felt today, no ctxs on toco  - FFN positive on 9/13.  - Indocin for an additional 24h to be completed - DA Doppler wnl  - Vaginal and urine culture negative 9/14 - LMP: 2/26/2021  - JEWELL: 12/3/2021 by LMP  - Cervical exam: 2/50/-3 for 3 exams. speculum exam yesterday closed, 50%, posterior.  - [place]: No ctxs felt today, no ctxs on toco  - FFN positive on 9/13.  - Indocin for an additional 24h to be completed - DA Doppler wnl  - Vaginal and urine culture negative 9/14 - LMP: 2/26/2021  - JEWELL: 12/3/2021 by LMP  - Cervical exam: 2/50/-3 for 3 exams. speculum exam yesterday closed, 50%, posterior.  - [place]: No ctxs felt today, no ctxs on toco  - FFN positive on 9/13  - Indocin for an additional 24h to be completed - DA Doppler wnl  - Vaginal and urine culture negative 9/14  - pending last NST at 10AM  -dispo: safe for discharge home with return precautions

## 2021-09-16 NOTE — PROGRESS NOTE ADULT - PROBLEM SELECTOR PLAN 6
- Cervical length: 2.6cm > 1.4cm in office > 2.6cm > 2.81cm abdominally on 9/15  - on vaginal progesterone at home  - Will continue on discharge - Cervical length: 2.6 cm > 1.4 cm in office > 2.6 cm > 2.8 cm abdominally on 9/15  - Daily vaginal progesterone   - Will continue medication on discharge

## 2021-09-16 NOTE — DISCHARGE NOTE ANTEPARTUM - CARE PROVIDER_API CALL
Calvin Tolliver)  MaternalFetal Medicine; Obstetrics and Gynecology  [address] [place] [address], [address]  [place] [address]  Phone: [phone]  Fax: [phone]  Follow Up Time: 1 week

## 2021-09-16 NOTE — PROGRESS NOTE ADULT - PROBLEM SELECTOR PROBLEM 2
Gestational diabetes mellitus (GDM) in third trimester
Cervical funneling complicating pregnancy, third trimester
Gestational diabetes mellitus (GDM) in third trimester

## 2021-09-16 NOTE — DISCHARGE NOTE ANTEPARTUM - MEDICATION SUMMARY - MEDICATIONS TO STOP TAKING
I will STOP taking the medications listed below when I get home from the hospital:    ibuprofen 600 mg oral tablet  -- 1 tab(s) by mouth every 6 hours    Oxaydo 5 mg oral tablet  -- 1 tab(s) by mouth every 6 hours, As Needed -for severe pain MDD:4  -- Caution federal law prohibits the transfer of this drug to any person other  than the person for whom it was prescribed.  It is very important that you take or use this exactly as directed.  Do not skip doses or discontinue unless directed by your doctor.  May cause drowsiness.  Alcohol may intensify this effect.  Use care when operating dangerous machinery.  This prescription cannot be refilled.  Using more of this medication than prescribed may cause serious breathing problems.

## 2021-09-16 NOTE — DISCHARGE NOTE ANTEPARTUM - CARE PLAN
1 Principal Discharge DX:	Gestational diabetes mellitus (GDM) in third trimester  Assessment and plan of treatment:	1) Please take your blood sugar at home before meals in the morning, and 1 hour after meals  2) On your diabetes log please record what you are eating and your fingersticks  3) You will start with insulin 16u at night.  4) Follow up with MFM within a week to review glucose logs and determine whether dose needs to be increased or decreased  5) Follow up with your regular OBGYN as scheduled  Secondary Diagnosis:	Short cervix  Assessment and plan of treatment:	1) Continue vaginal progesterone as prescribed  2) Return to L&D if you are experiencing regular painful contractions, increased pressure, large gush of fluid, or heavy vaginal bleeding  3) Monitor fetal kick counts and return to L&D if you are experiencing decreased fetal movement for several hours

## 2021-09-16 NOTE — PROGRESS NOTE ADULT - PROBLEM SELECTOR PLAN 3
- symptomatic in pregnancy, possibly contributing to  ctxs  - Indocin 7d course  with improvement   - fibroid size stable - 7.3 x 8.4cm
as above
- symptomatic in pregnancy, possibly contributing to  ctxs  - Indocin 7d course  with improvement   - fibroid size stable - 7.3 x 8.4cm

## 2021-09-20 ENCOUNTER — NON-APPOINTMENT (OUTPATIENT)
Age: 36
End: 2021-09-20

## 2021-09-22 ENCOUNTER — APPOINTMENT (OUTPATIENT)
Dept: MATERNAL FETAL MEDICINE | Facility: CLINIC | Age: 36
End: 2021-09-22
Payer: COMMERCIAL

## 2021-09-22 ENCOUNTER — ASOB RESULT (OUTPATIENT)
Age: 36
End: 2021-09-22

## 2021-09-22 VITALS — WEIGHT: 171 LBS | HEIGHT: 62 IN | BODY MASS INDEX: 31.47 KG/M2

## 2021-09-22 LAB — BACTERIA UR CULT: NORMAL

## 2021-09-22 PROCEDURE — G0108 DIAB MANAGE TRN  PER INDIV: CPT | Mod: 95

## 2021-09-23 ENCOUNTER — APPOINTMENT (OUTPATIENT)
Dept: ANTEPARTUM | Facility: CLINIC | Age: 36
End: 2021-09-23
Payer: COMMERCIAL

## 2021-09-23 ENCOUNTER — ASOB RESULT (OUTPATIENT)
Age: 36
End: 2021-09-23

## 2021-09-23 ENCOUNTER — APPOINTMENT (OUTPATIENT)
Dept: MATERNAL FETAL MEDICINE | Facility: CLINIC | Age: 36
End: 2021-09-23
Payer: COMMERCIAL

## 2021-09-23 VITALS
WEIGHT: 169.06 LBS | HEIGHT: 62 IN | BODY MASS INDEX: 31.11 KG/M2 | OXYGEN SATURATION: 98 % | RESPIRATION RATE: 18 BRPM | DIASTOLIC BLOOD PRESSURE: 62 MMHG | SYSTOLIC BLOOD PRESSURE: 112 MMHG | HEART RATE: 86 BPM

## 2021-09-23 PROCEDURE — 76816 OB US FOLLOW-UP PER FETUS: CPT

## 2021-09-23 PROCEDURE — 99215 OFFICE O/P EST HI 40 MIN: CPT | Mod: 25

## 2021-09-23 RX ORDER — PROGESTERONE 90 MG/1.125G
8 GEL VAGINAL DAILY
Qty: 2 | Refills: 4 | Status: DISCONTINUED | COMMUNITY
Start: 2021-08-06 | End: 2021-09-23

## 2021-09-23 RX ORDER — INDOMETHACIN 25 MG/1
25 CAPSULE ORAL
Qty: 23 | Refills: 0 | Status: DISCONTINUED | COMMUNITY
Start: 2021-08-06 | End: 2021-09-23

## 2021-09-24 ENCOUNTER — APPOINTMENT (OUTPATIENT)
Dept: OBGYN | Facility: CLINIC | Age: 36
End: 2021-09-24
Payer: COMMERCIAL

## 2021-09-24 VITALS
BODY MASS INDEX: 31.77 KG/M2 | DIASTOLIC BLOOD PRESSURE: 78 MMHG | WEIGHT: 168.25 LBS | HEIGHT: 61 IN | SYSTOLIC BLOOD PRESSURE: 94 MMHG

## 2021-09-24 DIAGNOSIS — Z34.92 ENCOUNTER FOR SUPERVISION OF NORMAL PREGNANCY, UNSPECIFIED, SECOND TRIMESTER: ICD-10-CM

## 2021-09-24 LAB
BILIRUB UR QL STRIP: NEGATIVE
GLUCOSE UR-MCNC: NEGATIVE
HCG UR QL: 0.2 EU/DL
HGB UR QL STRIP.AUTO: ABNORMAL
KETONES UR-MCNC: NEGATIVE
LEUKOCYTE ESTERASE UR QL STRIP: NEGATIVE
NITRITE UR QL STRIP: NEGATIVE
PH UR STRIP: 6
PROT UR STRIP-MCNC: NEGATIVE
SP GR UR STRIP: 1.03

## 2021-09-24 PROCEDURE — 90471 IMMUNIZATION ADMIN: CPT

## 2021-09-24 PROCEDURE — 90715 TDAP VACCINE 7 YRS/> IM: CPT

## 2021-09-24 PROCEDURE — 0502F SUBSEQUENT PRENATAL CARE: CPT

## 2021-09-26 NOTE — HISTORY OF PRESENT ILLNESS
[FreeTextEntry1] : Gloria Peck is a 35 y.o.  with LMP of 21 and JEWELL of 12/3/21 who presents at 29w6d for  consultation secondary to pregnancy complicated by A2GDM, short cervix on vaginal progesterone, and EDUIN myoma.  Her BMI is 31.8 kg/m2. She was recently hospitalized from - for  labor and received betamethasone, MGSO4, and Indocin.  She was noted to be 2 cm dilated and having irregular contractions on tocometer.  Contractions resolved and she had no further cervical change and was discharged home to continue vaginal progesterone therapy.  Given expected hyperglycemia after betamethasone administration, her evening Humulin dose was increased from 12 to 16 units.  Since discharge, she has titrated her dose back to 12 units QHS.  She continues to log fasting and 1 hour postprandial BG values without complication.  Gloria confirms fetal movement.

## 2021-09-26 NOTE — DISCUSSION/SUMMARY
[FreeTextEntry1] : At the time of consultation, we reviewed  the various maternal, fetal and  complications of gestational diabetes.  Gloria has been following a carbohydrate consistent diet and logging BG values.  She does report dietary indiscretion with breakfast and this is usually her largest meal of the day.  Review of available BG log reveals all fasting values within range on Humulin 12 units QHS.  She has several elevated postprandial breakfast values but opts for trial of dietary modification prior to considering medical therapy at this time.  We again reviewed target BG values in pregnancy, with the goal to maintain fasting blood sugars less than 90 and one hour postprandial values less than 140 with no value less than 60 and no value greater than 200. The importance of dietary compliance and regular exercise was reviewed. She will continue Humulin 12 units QHS and will follow ups with diabetic education on 10/6/21.  \par \par Ultrasound today showed an AGA fetus with normal amniotic fluid volume and BPP.  Recommendations for fetal testing include serial assessment of fetal growth and weekly  testing at 32 weeks.  Delivery between 39 - 39  is recommended.  Finally, we discussed the importance of repeat GTT 6-12 weeks after delivery in order to test for persistent glucose intolerance outside of pregnancy.\par \par Regarding her short cervix and recent admission for  labor, Gloria reports no new symptoms concerning for  labor.   She will continue vaginal progesterone therapy as prescribed.  Given her current gestational age, no further cervical surveillance is recommended unless there is a change in clinical symptoms.  She was advised to refrain from work activity at this time; however, her clinical condition will be reevaluated at 34 weeks as she may be medically cleared to return to work at that time. The anterior EDUIN fibroid appears unchanged in size and appearance when compared to prior imaging. She was advised to report any concerning symptoms for prompt evaluation.  Serial growth evaluation is again recommended.

## 2021-09-26 NOTE — CHIEF COMPLAINT
[G ___] : G [unfilled] [P ___] : P [unfilled] [de-identified] : GDM, short cervix on vaginal progesterone therapy with recent hospital admission for arrested PTL, and uterine fibroid

## 2021-09-26 NOTE — REVIEW OF SYSTEMS
[Feeling Tired] : feeling tired [Abdominal Pain] : abdominal pain [Fever] : no fever [Sight Problems] : no sight problems [Chest Pain] : no chest pain [Vomiting] : no vomiting [Dyspnea] : no shortness of breath [Pelvic Pain] : no pelvic pain [Abn Vag Bleeding] : no abnormal vaginal bleeding [Frequency] : no frequency [Arthralgias] : no arthralgias [Breast Pain] : no breast pain [Headache] : no headache [Anxiety] : no anxiety [Easy Bleeding] : does not bleed easily [Easy Bruising] : does not bruise easily

## 2021-09-26 NOTE — OB HISTORY
[Pregnancy History] : patient received anesthesia [___] : at [unfilled] weeks GA [LMP: ___] : LMP: [unfilled] [JEWELL: ___] : JEWELL: [unfilled] [EGA: ___ wks] : EGA: [unfilled] wks [Spontaneous] : Spontaneous conception [Sonogram] : sonogram [at ___ wks] : at [unfilled] weeks [Definite:  ___ (Date)] : the last menstrual period was [unfilled] [FreeTextEntry1] : Previous MFM consultation for short cervix - initiated vaginal progesterone therapy\par Initial diabetic education 8/26/21\par Hospitalized 9/13-9/16/21 for PTL, 2 cm dilated, received MGSO4, betamethasone, Indocin

## 2021-09-26 NOTE — ACTIVE PROBLEMS
[Diabetes Mellitus] : no diabetes mellitus [Hypertension] : no hypertension [Heart Disease] : no heart disease [Autoimmune Disease] : no autoimmune disease [Renal Disease] : no kidney disease, no UTI [Neurologic Disorder] : no neurologic disorder, no epilepsy [Psychiatric Disorders] : no psychiatric disorders [Depression] : no depression, no post partum depression [Hepatic Disorder] : no hepatitis, no liver disease [Thrombophlebitis] : no varicosities, no phlebitis [Thyroid Disorder] : no thyroid dysfunction [Trauma] : no trauma/violence [Blood Transfusion (___ Ml)] : no history of blood transfusion [FreeTextEntry1] : .

## 2021-09-26 NOTE — VITALS
[LMP (date): ___] : LMP was on [unfilled] [GA= ___ Days] : and [unfilled] day(s) [JEWELL by LMP (date): ___] : The calculated JEWELL by LMP is [unfilled] [By LMP] : this is the final JEWELL [GA =___ Weeks] : which calculates to a GA of [unfilled] weeks

## 2021-09-26 NOTE — DATA REVIEWED
[FreeTextEntry1] : BG log review 9/17-9/22\par Fasting 83, 84, 72, 76, 85, 86\par 1 hour breakfast 140, 170, 95, 168, 164, 128\par 1 hour lunch 79, 98, 110, 119, 100, 110\par 1 hour dinner 84, 119, 142, 134\par \par 9/23/21 - [facility]\par cephalic, anterior placenta, EFW 1367 gm (20%), AC 27%, MONAE 13.01cm, BPP 8/8,  EDUIN myoma measuring 9.8 x 7.1 x 8.3 cm\par

## 2021-09-29 ENCOUNTER — NON-APPOINTMENT (OUTPATIENT)
Age: 36
End: 2021-09-29

## 2021-10-06 ENCOUNTER — ASOB RESULT (OUTPATIENT)
Age: 36
End: 2021-10-06

## 2021-10-06 ENCOUNTER — APPOINTMENT (OUTPATIENT)
Dept: MATERNAL FETAL MEDICINE | Facility: CLINIC | Age: 36
End: 2021-10-06
Payer: COMMERCIAL

## 2021-10-06 VITALS — HEIGHT: 61 IN | BODY MASS INDEX: 31.91 KG/M2 | WEIGHT: 169 LBS

## 2021-10-06 PROCEDURE — G0108 DIAB MANAGE TRN  PER INDIV: CPT | Mod: 95

## 2021-10-07 ENCOUNTER — APPOINTMENT (OUTPATIENT)
Dept: ANTEPARTUM | Facility: CLINIC | Age: 36
End: 2021-10-07
Payer: COMMERCIAL

## 2021-10-07 ENCOUNTER — ASOB RESULT (OUTPATIENT)
Age: 36
End: 2021-10-07

## 2021-10-07 PROCEDURE — 76818 FETAL BIOPHYS PROFILE W/NST: CPT

## 2021-10-07 PROCEDURE — 76820 UMBILICAL ARTERY ECHO: CPT

## 2021-10-08 ENCOUNTER — NON-APPOINTMENT (OUTPATIENT)
Age: 36
End: 2021-10-08

## 2021-10-08 ENCOUNTER — APPOINTMENT (OUTPATIENT)
Dept: OBGYN | Facility: CLINIC | Age: 36
End: 2021-10-08
Payer: COMMERCIAL

## 2021-10-08 VITALS
SYSTOLIC BLOOD PRESSURE: 120 MMHG | HEIGHT: 61 IN | BODY MASS INDEX: 32.47 KG/M2 | WEIGHT: 172 LBS | DIASTOLIC BLOOD PRESSURE: 68 MMHG

## 2021-10-08 LAB
BILIRUB UR QL STRIP: ABNORMAL
CLARITY UR: CLEAR
COLLECTION METHOD: NORMAL
GLUCOSE UR-MCNC: NORMAL
HCG UR QL: 0.2 EU/DL
HGB UR QL STRIP.AUTO: ABNORMAL
KETONES UR-MCNC: NORMAL
LEUKOCYTE ESTERASE UR QL STRIP: ABNORMAL
NITRITE UR QL STRIP: NORMAL
PH UR STRIP: 6
PROT UR STRIP-MCNC: ABNORMAL
SP GR UR STRIP: 1.03

## 2021-10-08 PROCEDURE — 0502F SUBSEQUENT PRENATAL CARE: CPT

## 2021-10-14 ENCOUNTER — APPOINTMENT (OUTPATIENT)
Dept: ANTEPARTUM | Facility: CLINIC | Age: 36
End: 2021-10-14
Payer: COMMERCIAL

## 2021-10-14 ENCOUNTER — ASOB RESULT (OUTPATIENT)
Age: 36
End: 2021-10-14

## 2021-10-14 PROCEDURE — 76820 UMBILICAL ARTERY ECHO: CPT

## 2021-10-14 PROCEDURE — 93976 VASCULAR STUDY: CPT

## 2021-10-14 PROCEDURE — 76818 FETAL BIOPHYS PROFILE W/NST: CPT

## 2021-10-22 ENCOUNTER — APPOINTMENT (OUTPATIENT)
Dept: OBGYN | Facility: CLINIC | Age: 36
End: 2021-10-22
Payer: COMMERCIAL

## 2021-10-22 ENCOUNTER — APPOINTMENT (OUTPATIENT)
Dept: ANTEPARTUM | Facility: CLINIC | Age: 36
End: 2021-10-22
Payer: COMMERCIAL

## 2021-10-22 ENCOUNTER — APPOINTMENT (OUTPATIENT)
Dept: MATERNAL FETAL MEDICINE | Facility: CLINIC | Age: 36
End: 2021-10-22
Payer: COMMERCIAL

## 2021-10-22 ENCOUNTER — ASOB RESULT (OUTPATIENT)
Age: 36
End: 2021-10-22

## 2021-10-22 VITALS
HEIGHT: 61 IN | SYSTOLIC BLOOD PRESSURE: 120 MMHG | WEIGHT: 175 LBS | DIASTOLIC BLOOD PRESSURE: 70 MMHG | BODY MASS INDEX: 33.04 KG/M2

## 2021-10-22 VITALS
BODY MASS INDEX: 33.61 KG/M2 | OXYGEN SATURATION: 99 % | WEIGHT: 178 LBS | HEART RATE: 82 BPM | DIASTOLIC BLOOD PRESSURE: 60 MMHG | SYSTOLIC BLOOD PRESSURE: 114 MMHG | RESPIRATION RATE: 16 BRPM | HEIGHT: 61 IN

## 2021-10-22 DIAGNOSIS — Z86.018 PERSONAL HISTORY OF OTHER BENIGN NEOPLASM: ICD-10-CM

## 2021-10-22 DIAGNOSIS — O09.522 SUPERVISION OF ELDERLY MULTIGRAVIDA, SECOND TRIMESTER: ICD-10-CM

## 2021-10-22 DIAGNOSIS — Z23 ENCOUNTER FOR IMMUNIZATION: ICD-10-CM

## 2021-10-22 DIAGNOSIS — O24.419 GESTATIONAL DIABETES MELLITUS IN PREGNANCY, UNSPECIFIED CONTROL: ICD-10-CM

## 2021-10-22 DIAGNOSIS — O99.212 OBESITY COMPLICATING PREGNANCY, SECOND TRIMESTER: ICD-10-CM

## 2021-10-22 DIAGNOSIS — O34.32 MATERNAL CARE FOR CERVICAL INCOMPETENCE, SECOND TRIMESTER: ICD-10-CM

## 2021-10-22 DIAGNOSIS — O26.872 CERVICAL SHORTENING, SECOND TRIMESTER: ICD-10-CM

## 2021-10-22 LAB — BACTERIA UR CULT: NORMAL

## 2021-10-22 PROCEDURE — 90471 IMMUNIZATION ADMIN: CPT

## 2021-10-22 PROCEDURE — 76820 UMBILICAL ARTERY ECHO: CPT

## 2021-10-22 PROCEDURE — 93976 VASCULAR STUDY: CPT

## 2021-10-22 PROCEDURE — 0502F SUBSEQUENT PRENATAL CARE: CPT

## 2021-10-22 PROCEDURE — 99214 OFFICE O/P EST MOD 30 MIN: CPT | Mod: 25

## 2021-10-22 PROCEDURE — 76818 FETAL BIOPHYS PROFILE W/NST: CPT

## 2021-10-22 PROCEDURE — 90686 IIV4 VACC NO PRSV 0.5 ML IM: CPT

## 2021-10-22 PROCEDURE — 76816 OB US FOLLOW-UP PER FETUS: CPT

## 2021-10-23 LAB
BILIRUB UR QL STRIP: ABNORMAL
GLUCOSE UR-MCNC: NORMAL
HCG UR QL: 0.2 EU/DL
HGB UR QL STRIP.AUTO: ABNORMAL
KETONES UR-MCNC: NORMAL
LEUKOCYTE ESTERASE UR QL STRIP: ABNORMAL
NITRITE UR QL STRIP: NORMAL
PH UR STRIP: 6.5
PROT UR STRIP-MCNC: 100
SP GR UR STRIP: 1.03

## 2021-10-25 NOTE — DATA REVIEWED
[FreeTextEntry1] : BG log review 10/8-10/22\par Fasting 81, 85, 78, 79, 78, 75, 79, 75, 77, 74, 80, 77, 82, 74, 81\par 1 hour breakfast 120, 124, 132, 125, 134, 139, 128, 120, 130, 126, 93, 105, 135, 138, 127\par 1 hour lunch 100, 111, 112, 99, 109, 113, 113, 114, 118, 136, 103, 115, 124\par 1 hour dinner 118, 99, 130, 120, 100, 118, 122\par \par 10/22/21 - [facility]\par cephalic, anterior placenta, EFW 2105 gm (18%), AC 65%, MONAE 13.59 cm, BPP 10/10,  EDUIN myoma appears unchanged from prior imaging

## 2021-10-25 NOTE — HISTORY OF PRESENT ILLNESS
[FreeTextEntry1] : Gloria Peck is a 35 y.o.  with LMP of 21 and JEWELL of 12/3/21 who presents at 34w for follow up  consultation secondary to pregnancy complicated by A2GDM, short cervix on vaginal progesterone, and EDUIN myoma.  Her BMI is 33.6 kg/m2. She feels well and reports irregular contractions.  She continues to log fasting and 1 hour postprandial BG values without complication and reports compliance with Humulin N 12 units QHS.  Gloria confirms fetal movement.

## 2021-10-25 NOTE — REVIEW OF SYSTEMS
[Abdominal Pain] : abdominal pain [Fever] : no fever [Feeling Tired] : not feeling tired [Sight Problems] : no sight problems [Chest Pain] : no chest pain [Dyspnea] : no shortness of breath [Vomiting] : no vomiting [Pelvic Pain] : no pelvic pain [Abn Vag Bleeding] : no abnormal vaginal bleeding [Frequency] : no frequency [Arthralgias] : no arthralgias [Breast Pain] : no breast pain [Headache] : no headache [Anxiety] : no anxiety [Easy Bleeding] : does not bleed easily [Easy Bruising] : does not bruise easily

## 2021-10-25 NOTE — CHIEF COMPLAINT
[G ___] : G [unfilled] [P ___] : P [unfilled] [de-identified] : GDM, short cervix on vaginal progesterone therapy, and uterine fibroid

## 2021-10-25 NOTE — DISCUSSION/SUMMARY
[FreeTextEntry1] : At the time of follow up consultation, we again reviewed  the various maternal, fetal and  complications of gestational diabetes.  Gloria has been following a carbohydrate consistent diet and logging BG values.  She reports improvement in breakfast food choices.  Review of available BG log reveals all values within target range on her current dose of Humulin 12 units QHS.  We again reviewed target BG values in pregnancy, with the goal to maintain fasting blood sugars less than 90 and one hour postprandial values less than 140 with no value less than 60 and no value greater than 200. The importance of dietary compliance and regular exercise was reviewed. She will continue Humulin 12 units QHS and will follow ups with diabetic education on 10/27/21.  \par \par Ultrasound today showed an AGA fetus with appropriate interval growth, normal amniotic fluid volume, and BPP.  Recommendations for fetal testing include serial assessment of fetal growth and continued weekly  testing.  Delivery between 39 - 39  is recommended.  Finally, we discussed the importance of repeat GTT 6-12 weeks after delivery in order to test for persistent glucose intolerance outside of pregnancy.\par \par Regarding her short cervix, Gloria reports intermittent abdominal cramping and pelvic pressure.  She has reported some pink staining with wiping and feels it may be secondary to irritation from the vaginal progesterone therapy.  She was advised to alternate days og treatment and to discontinue therapy at 36-37 weeks.  She will continue to refrain for work activity.  The anterior EDUIN fibroid appears unchanged in size and appearance when compared to prior imaging. She was advised to report any concerning symptoms for prompt evaluation.  Serial growth evaluation is again recommended.

## 2021-10-25 NOTE — OB HISTORY
[Pregnancy History] : patient received anesthesia [___] : at [unfilled] weeks GA [LMP: ___] : LMP: [unfilled] [JEWELL: ___] : JEWELL: [unfilled] [Spontaneous] : Spontaneous conception [Sonogram] : sonogram [at ___ wks] : at [unfilled] weeks [Definite:  ___ (Date)] : the last menstrual period was [unfilled] [EGA: ___ wks] : EGA: [unfilled] wks [FreeTextEntry1] : Previous MFM consultation for short cervix - initiated vaginal progesterone therapy\par Initial diabetic education 8/26/21\par Hospitalized 9/13-9/16/21 for PTL, 2 cm dilated, received MGSO4, betamethasone, Indocin

## 2021-10-27 ENCOUNTER — ASOB RESULT (OUTPATIENT)
Age: 36
End: 2021-10-27

## 2021-10-27 ENCOUNTER — APPOINTMENT (OUTPATIENT)
Dept: MATERNAL FETAL MEDICINE | Facility: CLINIC | Age: 36
End: 2021-10-27
Payer: COMMERCIAL

## 2021-10-27 PROCEDURE — G0108 DIAB MANAGE TRN  PER INDIV: CPT | Mod: 95

## 2021-10-28 ENCOUNTER — APPOINTMENT (OUTPATIENT)
Dept: ANTEPARTUM | Facility: CLINIC | Age: 36
End: 2021-10-28
Payer: COMMERCIAL

## 2021-10-28 ENCOUNTER — ASOB RESULT (OUTPATIENT)
Age: 36
End: 2021-10-28

## 2021-10-28 PROCEDURE — 93976 VASCULAR STUDY: CPT

## 2021-10-28 PROCEDURE — 76820 UMBILICAL ARTERY ECHO: CPT

## 2021-10-28 PROCEDURE — 76816 OB US FOLLOW-UP PER FETUS: CPT

## 2021-10-28 PROCEDURE — 76818 FETAL BIOPHYS PROFILE W/NST: CPT

## 2021-11-04 ENCOUNTER — ASOB RESULT (OUTPATIENT)
Age: 36
End: 2021-11-04

## 2021-11-04 ENCOUNTER — APPOINTMENT (OUTPATIENT)
Dept: ANTEPARTUM | Facility: CLINIC | Age: 36
End: 2021-11-04
Payer: COMMERCIAL

## 2021-11-04 PROCEDURE — 76820 UMBILICAL ARTERY ECHO: CPT

## 2021-11-04 PROCEDURE — 93976 VASCULAR STUDY: CPT

## 2021-11-04 PROCEDURE — 76818 FETAL BIOPHYS PROFILE W/NST: CPT

## 2021-11-05 ENCOUNTER — NON-APPOINTMENT (OUTPATIENT)
Age: 36
End: 2021-11-05

## 2021-11-05 ENCOUNTER — APPOINTMENT (OUTPATIENT)
Dept: OBGYN | Facility: CLINIC | Age: 36
End: 2021-11-05
Payer: COMMERCIAL

## 2021-11-05 VITALS
WEIGHT: 179 LBS | BODY MASS INDEX: 33.79 KG/M2 | SYSTOLIC BLOOD PRESSURE: 126 MMHG | DIASTOLIC BLOOD PRESSURE: 78 MMHG | HEIGHT: 61 IN

## 2021-11-05 DIAGNOSIS — N89.8 OTHER SPECIFIED NONINFLAMMATORY DISORDERS OF VAGINA: ICD-10-CM

## 2021-11-05 LAB
BILIRUB UR QL STRIP: ABNORMAL
GLUCOSE BLDC GLUCOMTR-MCNC: 123
GLUCOSE UR-MCNC: ABNORMAL
HCG UR QL: 0.2 EU/DL
HGB UR QL STRIP.AUTO: ABNORMAL
KETONES UR-MCNC: ABNORMAL
LEUKOCYTE ESTERASE UR QL STRIP: ABNORMAL
NITRITE UR QL STRIP: NORMAL
PH UR STRIP: 6
PROT UR STRIP-MCNC: ABNORMAL
SP GR UR STRIP: >1.03

## 2021-11-05 PROCEDURE — 0502F SUBSEQUENT PRENATAL CARE: CPT

## 2021-11-05 PROCEDURE — 82962 GLUCOSE BLOOD TEST: CPT

## 2021-11-05 PROCEDURE — 36415 COLL VENOUS BLD VENIPUNCTURE: CPT

## 2021-11-08 ENCOUNTER — TRANSCRIPTION ENCOUNTER (OUTPATIENT)
Age: 36
End: 2021-11-08

## 2021-11-08 LAB
B-HEM STREP SPEC QL CULT: NORMAL
CANDIDA VAG CYTO: NOT DETECTED
G VAGINALIS+PREV SP MTYP VAG QL MICRO: NOT DETECTED
HIV1+2 AB SPEC QL IA.RAPID: NONREACTIVE
T PALLIDUM AB SER QL IA: NEGATIVE
T VAGINALIS VAG QL WET PREP: NOT DETECTED

## 2021-11-10 ENCOUNTER — APPOINTMENT (OUTPATIENT)
Dept: OBGYN | Facility: CLINIC | Age: 36
End: 2021-11-10
Payer: COMMERCIAL

## 2021-11-10 ENCOUNTER — ASOB RESULT (OUTPATIENT)
Age: 36
End: 2021-11-10

## 2021-11-10 VITALS
WEIGHT: 180.4 LBS | HEIGHT: 61 IN | DIASTOLIC BLOOD PRESSURE: 74 MMHG | SYSTOLIC BLOOD PRESSURE: 124 MMHG | BODY MASS INDEX: 34.06 KG/M2

## 2021-11-10 LAB
BILIRUB UR QL STRIP: NORMAL
GLUCOSE UR-MCNC: NORMAL
HCG UR QL: 0.2 EU/DL
HGB UR QL STRIP.AUTO: ABNORMAL
KETONES UR-MCNC: ABNORMAL
LEUKOCYTE ESTERASE UR QL STRIP: ABNORMAL
NITRITE UR QL STRIP: NORMAL
PH UR STRIP: 6
PROT UR STRIP-MCNC: ABNORMAL
SP GR UR STRIP: 1.02

## 2021-11-10 PROCEDURE — 0502F SUBSEQUENT PRENATAL CARE: CPT

## 2021-11-10 PROCEDURE — 76818 FETAL BIOPHYS PROFILE W/NST: CPT

## 2021-11-10 PROCEDURE — 76817 TRANSVAGINAL US OBSTETRIC: CPT | Mod: 59

## 2021-11-11 ENCOUNTER — APPOINTMENT (OUTPATIENT)
Dept: ANTEPARTUM | Facility: CLINIC | Age: 36
End: 2021-11-11

## 2021-11-11 ENCOUNTER — NON-APPOINTMENT (OUTPATIENT)
Age: 36
End: 2021-11-11

## 2021-11-12 ENCOUNTER — OUTPATIENT (OUTPATIENT)
Dept: OUTPATIENT SERVICES | Facility: HOSPITAL | Age: 36
LOS: 1 days | End: 2021-11-12

## 2021-11-12 VITALS
WEIGHT: 152.12 LBS | DIASTOLIC BLOOD PRESSURE: 80 MMHG | RESPIRATION RATE: 16 BRPM | HEART RATE: 80 BPM | HEIGHT: 61 IN | SYSTOLIC BLOOD PRESSURE: 140 MMHG | TEMPERATURE: 98 F

## 2021-11-12 DIAGNOSIS — Z98.890 OTHER SPECIFIED POSTPROCEDURAL STATES: Chronic | ICD-10-CM

## 2021-11-12 DIAGNOSIS — Z01.818 ENCOUNTER FOR OTHER PREPROCEDURAL EXAMINATION: ICD-10-CM

## 2021-11-12 LAB
ALBUMIN SERPL ELPH-MCNC: 3.5 G/DL — SIGNIFICANT CHANGE UP (ref 3.3–5.2)
ALP SERPL-CCNC: 181 U/L — HIGH (ref 40–120)
ALT FLD-CCNC: 9 U/L — SIGNIFICANT CHANGE UP
ANION GAP SERPL CALC-SCNC: 13 MMOL/L — SIGNIFICANT CHANGE UP (ref 5–17)
APPEARANCE UR: CLEAR — SIGNIFICANT CHANGE UP
AST SERPL-CCNC: 14 U/L — SIGNIFICANT CHANGE UP
BACTERIA # UR AUTO: ABNORMAL
BASOPHILS # BLD AUTO: 0.04 K/UL — SIGNIFICANT CHANGE UP (ref 0–0.2)
BASOPHILS NFR BLD AUTO: 0.3 % — SIGNIFICANT CHANGE UP (ref 0–2)
BILIRUB SERPL-MCNC: <0.2 MG/DL — LOW (ref 0.4–2)
BILIRUB UR-MCNC: NEGATIVE — SIGNIFICANT CHANGE UP
BLD GP AB SCN SERPL QL: SIGNIFICANT CHANGE UP
BUN SERPL-MCNC: 9.7 MG/DL — SIGNIFICANT CHANGE UP (ref 8–20)
CALCIUM SERPL-MCNC: 8.5 MG/DL — LOW (ref 8.6–10.2)
CHLORIDE SERPL-SCNC: 102 MMOL/L — SIGNIFICANT CHANGE UP (ref 98–107)
CO2 SERPL-SCNC: 20 MMOL/L — LOW (ref 22–29)
COLOR SPEC: YELLOW — SIGNIFICANT CHANGE UP
CREAT SERPL-MCNC: 0.42 MG/DL — LOW (ref 0.5–1.3)
DIFF PNL FLD: ABNORMAL
EOSINOPHIL # BLD AUTO: 0.32 K/UL — SIGNIFICANT CHANGE UP (ref 0–0.5)
EOSINOPHIL NFR BLD AUTO: 2.7 % — SIGNIFICANT CHANGE UP (ref 0–6)
EPI CELLS # UR: SIGNIFICANT CHANGE UP
GLUCOSE SERPL-MCNC: 79 MG/DL — SIGNIFICANT CHANGE UP (ref 70–99)
GLUCOSE UR QL: NEGATIVE MG/DL — SIGNIFICANT CHANGE UP
HCT VFR BLD CALC: 34.5 % — SIGNIFICANT CHANGE UP (ref 34.5–45)
HGB BLD-MCNC: 11.4 G/DL — LOW (ref 11.5–15.5)
IMM GRANULOCYTES NFR BLD AUTO: 0.6 % — SIGNIFICANT CHANGE UP (ref 0–1.5)
KETONES UR-MCNC: ABNORMAL
LEUKOCYTE ESTERASE UR-ACNC: NEGATIVE — SIGNIFICANT CHANGE UP
LYMPHOCYTES # BLD AUTO: 1.7 K/UL — SIGNIFICANT CHANGE UP (ref 1–3.3)
LYMPHOCYTES # BLD AUTO: 14.4 % — SIGNIFICANT CHANGE UP (ref 13–44)
MCHC RBC-ENTMCNC: 30.4 PG — SIGNIFICANT CHANGE UP (ref 27–34)
MCHC RBC-ENTMCNC: 33 GM/DL — SIGNIFICANT CHANGE UP (ref 32–36)
MCV RBC AUTO: 92 FL — SIGNIFICANT CHANGE UP (ref 80–100)
MONOCYTES # BLD AUTO: 0.51 K/UL — SIGNIFICANT CHANGE UP (ref 0–0.9)
MONOCYTES NFR BLD AUTO: 4.3 % — SIGNIFICANT CHANGE UP (ref 2–14)
NEUTROPHILS # BLD AUTO: 9.15 K/UL — HIGH (ref 1.8–7.4)
NEUTROPHILS NFR BLD AUTO: 77.7 % — HIGH (ref 43–77)
NITRITE UR-MCNC: NEGATIVE — SIGNIFICANT CHANGE UP
PH UR: 6 — SIGNIFICANT CHANGE UP (ref 5–8)
PLATELET # BLD AUTO: 308 K/UL — SIGNIFICANT CHANGE UP (ref 150–400)
POTASSIUM SERPL-MCNC: 4 MMOL/L — SIGNIFICANT CHANGE UP (ref 3.5–5.3)
POTASSIUM SERPL-SCNC: 4 MMOL/L — SIGNIFICANT CHANGE UP (ref 3.5–5.3)
PROT SERPL-MCNC: 6.4 G/DL — LOW (ref 6.6–8.7)
PROT UR-MCNC: 15 MG/DL
RBC # BLD: 3.75 M/UL — LOW (ref 3.8–5.2)
RBC # FLD: 13.3 % — SIGNIFICANT CHANGE UP (ref 10.3–14.5)
RBC CASTS # UR COMP ASSIST: ABNORMAL /HPF (ref 0–4)
SODIUM SERPL-SCNC: 135 MMOL/L — SIGNIFICANT CHANGE UP (ref 135–145)
SP GR SPEC: 1.01 — SIGNIFICANT CHANGE UP (ref 1.01–1.02)
UROBILINOGEN FLD QL: NEGATIVE MG/DL — SIGNIFICANT CHANGE UP
WBC # BLD: 11.79 K/UL — HIGH (ref 3.8–10.5)
WBC # FLD AUTO: 11.79 K/UL — HIGH (ref 3.8–10.5)
WBC UR QL: SIGNIFICANT CHANGE UP

## 2021-11-17 ENCOUNTER — NON-APPOINTMENT (OUTPATIENT)
Age: 36
End: 2021-11-17

## 2021-11-17 ENCOUNTER — APPOINTMENT (OUTPATIENT)
Dept: OBGYN | Facility: CLINIC | Age: 36
End: 2021-11-17
Payer: COMMERCIAL

## 2021-11-17 ENCOUNTER — ASOB RESULT (OUTPATIENT)
Age: 36
End: 2021-11-17

## 2021-11-17 ENCOUNTER — APPOINTMENT (OUTPATIENT)
Dept: MATERNAL FETAL MEDICINE | Facility: CLINIC | Age: 36
End: 2021-11-17
Payer: COMMERCIAL

## 2021-11-17 VITALS — BODY MASS INDEX: 34.96 KG/M2 | SYSTOLIC BLOOD PRESSURE: 110 MMHG | WEIGHT: 185 LBS | DIASTOLIC BLOOD PRESSURE: 70 MMHG

## 2021-11-17 LAB
BILIRUB UR QL STRIP: NORMAL
GLUCOSE UR-MCNC: NORMAL
HCG UR QL: 0.2 EU/DL
HGB UR QL STRIP.AUTO: ABNORMAL
KETONES UR-MCNC: NORMAL
LEUKOCYTE ESTERASE UR QL STRIP: ABNORMAL
NITRITE UR QL STRIP: NORMAL
PH UR STRIP: 7
PROT UR STRIP-MCNC: ABNORMAL
SP GR UR STRIP: 1.02

## 2021-11-17 PROCEDURE — 0502F SUBSEQUENT PRENATAL CARE: CPT

## 2021-11-17 PROCEDURE — G0108 DIAB MANAGE TRN  PER INDIV: CPT | Mod: 95

## 2021-11-18 ENCOUNTER — APPOINTMENT (OUTPATIENT)
Dept: ANTEPARTUM | Facility: CLINIC | Age: 36
End: 2021-11-18
Payer: COMMERCIAL

## 2021-11-18 ENCOUNTER — ASOB RESULT (OUTPATIENT)
Age: 36
End: 2021-11-18

## 2021-11-18 PROCEDURE — 93976 VASCULAR STUDY: CPT

## 2021-11-18 PROCEDURE — 76816 OB US FOLLOW-UP PER FETUS: CPT

## 2021-11-18 PROCEDURE — 76818 FETAL BIOPHYS PROFILE W/NST: CPT

## 2021-11-18 PROCEDURE — 76820 UMBILICAL ARTERY ECHO: CPT

## 2021-11-21 ENCOUNTER — RESULT REVIEW (OUTPATIENT)
Age: 36
End: 2021-11-21

## 2021-11-21 ENCOUNTER — TRANSCRIPTION ENCOUNTER (OUTPATIENT)
Age: 36
End: 2021-11-21

## 2021-11-22 ENCOUNTER — TRANSCRIPTION ENCOUNTER (OUTPATIENT)
Age: 36
End: 2021-11-22

## 2021-11-22 ENCOUNTER — NON-APPOINTMENT (OUTPATIENT)
Age: 36
End: 2021-11-22

## 2021-11-22 ENCOUNTER — INPATIENT (INPATIENT)
Facility: HOSPITAL | Age: 36
LOS: 2 days | Discharge: HOME | End: 2021-11-25
Attending: SPECIALIST | Admitting: SPECIALIST
Payer: COMMERCIAL

## 2021-11-22 VITALS — RESPIRATION RATE: 16 BRPM | TEMPERATURE: 98 F

## 2021-11-22 DIAGNOSIS — O47.1 FALSE LABOR AT OR AFTER 37 COMPLETED WEEKS OF GESTATION: ICD-10-CM

## 2021-11-22 DIAGNOSIS — O34.219 MATERNAL CARE FOR UNSPECIFIED TYPE SCAR FROM PREVIOUS CESAREAN DELIVERY: ICD-10-CM

## 2021-11-22 DIAGNOSIS — Z98.890 OTHER SPECIFIED POSTPROCEDURAL STATES: Chronic | ICD-10-CM

## 2021-11-22 LAB
ALBUMIN SERPL ELPH-MCNC: 3.7 G/DL — SIGNIFICANT CHANGE UP (ref 3.3–5.2)
ALP SERPL-CCNC: 213 U/L — HIGH (ref 40–120)
ALT FLD-CCNC: 9 U/L — SIGNIFICANT CHANGE UP
ANION GAP SERPL CALC-SCNC: 17 MMOL/L — SIGNIFICANT CHANGE UP (ref 5–17)
AST SERPL-CCNC: 14 U/L — SIGNIFICANT CHANGE UP
BASOPHILS # BLD AUTO: 0.04 K/UL — SIGNIFICANT CHANGE UP (ref 0–0.2)
BASOPHILS NFR BLD AUTO: 0.4 % — SIGNIFICANT CHANGE UP (ref 0–2)
BILIRUB SERPL-MCNC: 0.2 MG/DL — LOW (ref 0.4–2)
BLD GP AB SCN SERPL QL: SIGNIFICANT CHANGE UP
BUN SERPL-MCNC: 11.2 MG/DL — SIGNIFICANT CHANGE UP (ref 8–20)
CALCIUM SERPL-MCNC: 9.1 MG/DL — SIGNIFICANT CHANGE UP (ref 8.6–10.2)
CHLORIDE SERPL-SCNC: 102 MMOL/L — SIGNIFICANT CHANGE UP (ref 98–107)
CO2 SERPL-SCNC: 16 MMOL/L — LOW (ref 22–29)
COVID-19 SPIKE DOMAIN AB INTERP: POSITIVE
COVID-19 SPIKE DOMAIN ANTIBODY RESULT: >250 U/ML — HIGH
CREAT SERPL-MCNC: 0.51 MG/DL — SIGNIFICANT CHANGE UP (ref 0.5–1.3)
EOSINOPHIL # BLD AUTO: 0.15 K/UL — SIGNIFICANT CHANGE UP (ref 0–0.5)
EOSINOPHIL NFR BLD AUTO: 1.4 % — SIGNIFICANT CHANGE UP (ref 0–6)
GLUCOSE SERPL-MCNC: 97 MG/DL — SIGNIFICANT CHANGE UP (ref 70–99)
HCT VFR BLD CALC: 38.2 % — SIGNIFICANT CHANGE UP (ref 34.5–45)
HGB BLD-MCNC: 12.8 G/DL — SIGNIFICANT CHANGE UP (ref 11.5–15.5)
IMM GRANULOCYTES NFR BLD AUTO: 0.7 % — SIGNIFICANT CHANGE UP (ref 0–1.5)
LYMPHOCYTES # BLD AUTO: 2.9 K/UL — SIGNIFICANT CHANGE UP (ref 1–3.3)
LYMPHOCYTES # BLD AUTO: 26.7 % — SIGNIFICANT CHANGE UP (ref 13–44)
MCHC RBC-ENTMCNC: 30.5 PG — SIGNIFICANT CHANGE UP (ref 27–34)
MCHC RBC-ENTMCNC: 33.5 GM/DL — SIGNIFICANT CHANGE UP (ref 32–36)
MCV RBC AUTO: 91 FL — SIGNIFICANT CHANGE UP (ref 80–100)
MONOCYTES # BLD AUTO: 0.53 K/UL — SIGNIFICANT CHANGE UP (ref 0–0.9)
MONOCYTES NFR BLD AUTO: 4.9 % — SIGNIFICANT CHANGE UP (ref 2–14)
NEUTROPHILS # BLD AUTO: 7.16 K/UL — SIGNIFICANT CHANGE UP (ref 1.8–7.4)
NEUTROPHILS NFR BLD AUTO: 65.9 % — SIGNIFICANT CHANGE UP (ref 43–77)
PLATELET # BLD AUTO: 353 K/UL — SIGNIFICANT CHANGE UP (ref 150–400)
POTASSIUM SERPL-MCNC: 3.9 MMOL/L — SIGNIFICANT CHANGE UP (ref 3.5–5.3)
POTASSIUM SERPL-SCNC: 3.9 MMOL/L — SIGNIFICANT CHANGE UP (ref 3.5–5.3)
PROT SERPL-MCNC: 6.8 G/DL — SIGNIFICANT CHANGE UP (ref 6.6–8.7)
RBC # BLD: 4.2 M/UL — SIGNIFICANT CHANGE UP (ref 3.8–5.2)
RBC # FLD: 13.1 % — SIGNIFICANT CHANGE UP (ref 10.3–14.5)
SARS-COV-2 IGG+IGM SERPL QL IA: >250 U/ML — HIGH
SARS-COV-2 IGG+IGM SERPL QL IA: POSITIVE
SARS-COV-2 RNA SPEC QL NAA+PROBE: SIGNIFICANT CHANGE UP
SODIUM SERPL-SCNC: 135 MMOL/L — SIGNIFICANT CHANGE UP (ref 135–145)
T PALLIDUM AB TITR SER: NEGATIVE — SIGNIFICANT CHANGE UP
WBC # BLD: 10.86 K/UL — HIGH (ref 3.8–10.5)
WBC # FLD AUTO: 10.86 K/UL — HIGH (ref 3.8–10.5)

## 2021-11-22 PROCEDURE — 59510 CESAREAN DELIVERY: CPT

## 2021-11-22 PROCEDURE — 88302 TISSUE EXAM BY PATHOLOGIST: CPT | Mod: 26

## 2021-11-22 PROCEDURE — 88307 TISSUE EXAM BY PATHOLOGIST: CPT | Mod: 26

## 2021-11-22 PROCEDURE — 58611 LIGATE OVIDUCT(S) ADD-ON: CPT | Mod: 59

## 2021-11-22 RX ORDER — SODIUM CHLORIDE 9 MG/ML
1000 INJECTION, SOLUTION INTRAVENOUS
Refills: 0 | Status: DISCONTINUED | OUTPATIENT
Start: 2021-11-22 | End: 2021-11-22

## 2021-11-22 RX ORDER — SIMETHICONE 80 MG/1
80 TABLET, CHEWABLE ORAL EVERY 4 HOURS
Refills: 0 | Status: DISCONTINUED | OUTPATIENT
Start: 2021-11-22 | End: 2021-11-25

## 2021-11-22 RX ORDER — IBUPROFEN 200 MG
600 TABLET ORAL EVERY 6 HOURS
Refills: 0 | Status: COMPLETED | OUTPATIENT
Start: 2021-11-22 | End: 2022-10-21

## 2021-11-22 RX ORDER — OXYCODONE HYDROCHLORIDE 5 MG/1
5 TABLET ORAL ONCE
Refills: 0 | Status: DISCONTINUED | OUTPATIENT
Start: 2021-11-22 | End: 2021-11-25

## 2021-11-22 RX ORDER — FAMOTIDINE 10 MG/ML
20 INJECTION INTRAVENOUS ONCE
Refills: 0 | Status: COMPLETED | OUTPATIENT
Start: 2021-11-22 | End: 2021-11-22

## 2021-11-22 RX ORDER — MAGNESIUM HYDROXIDE 400 MG/1
30 TABLET, CHEWABLE ORAL
Refills: 0 | Status: DISCONTINUED | OUTPATIENT
Start: 2021-11-22 | End: 2021-11-25

## 2021-11-22 RX ORDER — CITRIC ACID/SODIUM CITRATE 300-500 MG
30 SOLUTION, ORAL ORAL ONCE
Refills: 0 | Status: COMPLETED | OUTPATIENT
Start: 2021-11-22 | End: 2021-11-22

## 2021-11-22 RX ORDER — ENOXAPARIN SODIUM 100 MG/ML
40 INJECTION SUBCUTANEOUS EVERY 24 HOURS
Refills: 0 | Status: DISCONTINUED | OUTPATIENT
Start: 2021-11-22 | End: 2021-11-25

## 2021-11-22 RX ORDER — ONDANSETRON 8 MG/1
4 TABLET, FILM COATED ORAL EVERY 6 HOURS
Refills: 0 | Status: DISCONTINUED | OUTPATIENT
Start: 2021-11-22 | End: 2021-11-25

## 2021-11-22 RX ORDER — DIPHENHYDRAMINE HCL 50 MG
25 CAPSULE ORAL EVERY 6 HOURS
Refills: 0 | Status: DISCONTINUED | OUTPATIENT
Start: 2021-11-22 | End: 2021-11-25

## 2021-11-22 RX ORDER — OXYTOCIN 10 UNIT/ML
333.33 VIAL (ML) INJECTION
Qty: 20 | Refills: 0 | Status: DISCONTINUED | OUTPATIENT
Start: 2021-11-22 | End: 2021-11-25

## 2021-11-22 RX ORDER — CEFAZOLIN SODIUM 1 G
2000 VIAL (EA) INJECTION ONCE
Refills: 0 | Status: COMPLETED | OUTPATIENT
Start: 2021-11-22 | End: 2021-11-22

## 2021-11-22 RX ORDER — KETOROLAC TROMETHAMINE 30 MG/ML
30 SYRINGE (ML) INJECTION EVERY 6 HOURS
Refills: 0 | Status: DISCONTINUED | OUTPATIENT
Start: 2021-11-22 | End: 2021-11-23

## 2021-11-22 RX ORDER — ACETAMINOPHEN 500 MG
975 TABLET ORAL
Refills: 0 | Status: DISCONTINUED | OUTPATIENT
Start: 2021-11-22 | End: 2021-11-25

## 2021-11-22 RX ORDER — SODIUM CHLORIDE 9 MG/ML
1000 INJECTION, SOLUTION INTRAVENOUS ONCE
Refills: 0 | Status: COMPLETED | OUTPATIENT
Start: 2021-11-22 | End: 2021-11-22

## 2021-11-22 RX ORDER — SODIUM CHLORIDE 9 MG/ML
1000 INJECTION, SOLUTION INTRAVENOUS
Refills: 0 | Status: DISCONTINUED | OUTPATIENT
Start: 2021-11-22 | End: 2021-11-25

## 2021-11-22 RX ORDER — OXYCODONE HYDROCHLORIDE 5 MG/1
5 TABLET ORAL
Refills: 0 | Status: DISCONTINUED | OUTPATIENT
Start: 2021-11-22 | End: 2021-11-25

## 2021-11-22 RX ORDER — TETANUS TOXOID, REDUCED DIPHTHERIA TOXOID AND ACELLULAR PERTUSSIS VACCINE, ADSORBED 5; 2.5; 8; 8; 2.5 [IU]/.5ML; [IU]/.5ML; UG/.5ML; UG/.5ML; UG/.5ML
0.5 SUSPENSION INTRAMUSCULAR ONCE
Refills: 0 | Status: DISCONTINUED | OUTPATIENT
Start: 2021-11-22 | End: 2021-11-25

## 2021-11-22 RX ORDER — LANOLIN
1 OINTMENT (GRAM) TOPICAL EVERY 6 HOURS
Refills: 0 | Status: DISCONTINUED | OUTPATIENT
Start: 2021-11-22 | End: 2021-11-25

## 2021-11-22 RX ADMIN — Medication 30 MILLIGRAM(S): at 07:31

## 2021-11-22 RX ADMIN — Medication 30 MILLIGRAM(S): at 12:24

## 2021-11-22 RX ADMIN — SODIUM CHLORIDE 2000 MILLILITER(S): 9 INJECTION, SOLUTION INTRAVENOUS at 02:35

## 2021-11-22 RX ADMIN — Medication 100 MILLIGRAM(S): at 03:37

## 2021-11-22 RX ADMIN — Medication 30 MILLIGRAM(S): at 08:00

## 2021-11-22 RX ADMIN — Medication 30 MILLIGRAM(S): at 12:09

## 2021-11-22 RX ADMIN — ONDANSETRON 4 MILLIGRAM(S): 8 TABLET, FILM COATED ORAL at 09:52

## 2021-11-22 RX ADMIN — Medication 30 MILLIGRAM(S): at 17:20

## 2021-11-22 RX ADMIN — ENOXAPARIN SODIUM 40 MILLIGRAM(S): 100 INJECTION SUBCUTANEOUS at 17:20

## 2021-11-22 RX ADMIN — SODIUM CHLORIDE 125 MILLILITER(S): 9 INJECTION, SOLUTION INTRAVENOUS at 02:45

## 2021-11-22 RX ADMIN — Medication 30 MILLILITER(S): at 03:20

## 2021-11-22 RX ADMIN — Medication 1000 MILLIUNIT(S)/MIN: at 03:53

## 2021-11-22 RX ADMIN — Medication 975 MILLIGRAM(S): at 21:27

## 2021-11-22 RX ADMIN — FAMOTIDINE 20 MILLIGRAM(S): 10 INJECTION INTRAVENOUS at 03:20

## 2021-11-22 RX ADMIN — SODIUM CHLORIDE 125 MILLILITER(S): 9 INJECTION, SOLUTION INTRAVENOUS at 21:27

## 2021-11-22 NOTE — DISCHARGE NOTE OB - PLAN OF CARE
Pt underwent a bilateral salpingectomy. Please call your provider to schedule postoperative wound check visit in 2 weeks. Take medications as directed, regular diet, activity as tolerated. Exclusive breast feeding for the first 6 months is recommended. Nothing per vagina for 6 weeks (incl. sex, douching, etc). If you have additional concerns, please inform your provider.

## 2021-11-22 NOTE — OB RN DELIVERY SUMMARY - NS_SEPSISRSKCALC_OBGYN_ALL_OB_FT
No temperature has been documented for this patient in CPN or on the OB Flowsheet. Ensure the highest temperature during labor was documented on the OB Flowsheet.  No gestational age at birth has been documented. Ensure delivery date/time has been entered above.  Rupture of membranes must be entered above.  GBS status in the 'Prenatal Lab tests/results section' on the OB RN Patient Profile must be documented.   Rupture of membranes must be entered above.  GBS status in the 'Prenatal Lab tests/results section' on the OB RN Patient Profile must be documented.   EOS calculated successfully. EOS Risk Factor: 0.5/1000 live births ([facility] [place] national incidence); GA=38w3d; Temp=97.9; ROM=0.017; GBS='Negative'; Antibiotics='No antibiotics or any antibiotics < 2 hrs prior to birth'

## 2021-11-22 NOTE — OB NEONATOLOGY/PEDIATRICIAN DELIVERY SUMMARY - NSPEDSNEONOTESA_OBGYN_ALL_OB_FT
Dr. Del Valle requested me to attend R C/S at 38.3 weeks. The mother is 36 y/o, , O+, HIV NR, HBsAg NR, VDRL NR, GBS NR, and RI. She has GDM  L & D: Vacuum Assisted, flaccid, poor breathing effort, pale , clear fluid, bulb and deep suctioned, dried and PPV given x 1 min, then CPAP x 2 with improvement in color, and tone   A/S 3, 7, 8 BW: 3330gm.   Asst: FTAGA, BG, R C/S.  Plan: observe in transition, if stable admit to well-baby nursery for routine  care, inform PMD  CARLOS ESTEBAN MD [id number] Dr. Del Valle requested me to attend R C/S at 38.3 weeks. The mother is 34 y/o, , O+, HIV NR, HBsAg NR, VDRL NR, GBS NR, and RI. She has GDM  L & D: Vacuum Assisted, flaccid, poor breathing effort, pale , clear fluid, bulb and deep suctioned, dried and PPV given x 1 min, then CPAP x 2 with improvement in color, and tone   A/S 3, 7, 8 BW: 3330gm.   Asst: FTAGA, BG, R C/S, IDM.  Plan: observe in transition, F/U a/c, if stable admit to well-baby nursery for routine  care, inform PMD  CARLOS ESTEBAN MD [id number] Dr. Del Valle requested me to attend R C/S at 38.3 weeks. The mother is 36 y/o, , O+, HIV NR, HBsAg NR, VDRL NR, GBS NR, and RI. She has GDMA2 on insulin  L & D: Vacuum Assisted, flaccid, poor breathing effort, pale , clear fluid, bulb and deep suctioned, dried and PPV given x 1 min, then CPAP x 2 with improvement in color, and tone   A/S 3, 7, 8 BW: 3330gm.   Asst: FTAGA, BG, R C/S, IDM.  Plan: observe in transition, F/U a/c, if stable admit to well-baby nursery for routine  care, inform PMD  CARLOS ESTEBAN MD [id number]

## 2021-11-22 NOTE — OB PROVIDER H&P - ASSESSMENT
36yo  at 38w3d admitted in active labor with history of prior CS x1.   Patient aware she is a good candidate for TOLAC - declining at this time  BP elevated likely due to painful contractions  Cat 2 tracing - will resuscitate    Plan:  Admit to OBGYN  Routine labs  Continuous FHT + [place]  urgent rCS due to advance dilation  Ancef 2g    d/w Dr. Del Valle 36yo  at 38w3d admitted in active labor with history of prior CS x1.   Patient aware she is a good candidate for TOLAC - declining at this time  BP elevated likely due to painful contractions  Cat 2 tracing - will resuscitate    Plan:  Admit to OBGYN  Routine labs  Continuous FHT + [place]  urgent rCS due to advance dilation  Ancef 2g    d/w Dr. Del Valle    Addendum:    Risks, benefits, and alternatives of repeat  section with bilateral salpingectomy discussed at length, including risk of injury to adjacent organs, such as the bladder, bowel, and bilateral ureters, risk of infection, and risk of hemorrhage, which may lead to subsequent intervention and possible blood transfusion.  She has no [place] or personal objection to blood transfusion, if necessary.  She understands a bilateral salpingectomy will leave her unable to conceive without the assistance of a reproductive endocrinologist.  She is through childbearing.  She was given the opportunity to ask questions and all were addressed.  She understands the plan of care.    Ulises Del Valle, DO

## 2021-11-22 NOTE — DISCHARGE NOTE OB - MATERIALS PROVIDED
Guide to Postpartum Care/[facility] Hearing Screen Program/Back To Sleep Handout/Tdap Vaccination (VIS Pub Date: January 24, 2012)

## 2021-11-22 NOTE — OB PROVIDER DELIVERY SUMMARY - NSSELHIDDEN_OBGYN_ALL_OB_FT
[NS_DeliveryAttending1_OBGYN_ALL_OB_FT:[id number]=],[NS_DeliveryAssist1_OBGYN_ALL_OB_FT:[id number]=],[NS_DeliveryRN_OBGYN_ALL_OB_FT:[id number]=]

## 2021-11-22 NOTE — OB RN DELIVERY SUMMARY - NSSELHIDDEN_OBGYN_ALL_OB_FT
[NS_DeliveryAttending1_OBGYN_ALL_OB_FT:[email or web address]=],[NS_DeliveryAssist1_OBGYN_ALL_OB_FT:[id number]=],[NS_DeliveryRN_OBGYN_ALL_OB_FT:[id number]=]

## 2021-11-22 NOTE — DISCHARGE NOTE OB - CARE PROVIDER_API CALL
Jayant Jackson (MD)  Obstetrics and Gynecology  [address] [place] [address]  Phone: [phone]  Fax: [phone]  Follow Up Time:

## 2021-11-22 NOTE — DISCHARGE NOTE OB - PATIENT PORTAL LINK FT
Patient Name: Donell Bunn  YOB: 1959          MRN number:  [id number]  Date:  1/13/2017  Referring Physician:  Bibiana Loja MD         SPINE EVALUATION:    Referring Physician: Dr. Melvi Cho  Diagnosis: S/P C6/7 fusion     Date of Serv ext, side flex x 10 each  Patient education provided on squeeze ball for  strength left hand, correct sitting posture.   Patient was instructed in and issued a HEP for Isometric strengthening, AAROM cervical extension using towel, supine chin tuck and c You can access the FollowMyHealth Patient Portal offered by [facility] by registering at the following website: [email or web address]. By joining [employer]’s FollowMyHealth portal, you will also be able to view your health information using other applications (apps) compatible with our system.

## 2021-11-22 NOTE — OB RN INTRAOPERATIVE NOTE - NSSELHIDDEN_OBGYN_ALL_OB_FT
[NS_DeliveryAttending1_OBGYN_ALL_OB_FT:[email or web address]=],[NS_DeliveryAssist1_OBGYN_ALL_OB_FT:[id number]=],[NS_DeliveryRN_OBGYN_ALL_OB_FT:[id number]=]

## 2021-11-22 NOTE — OB PROVIDER H&P - HISTORY OF PRESENT ILLNESS
34yo  at 38w3d presenting due to painful contractions q3m. Denies vaginal bleeding or loss of fluid. Has not felt the baby move since contractions started.     Prenatal course significant for:   1. GDMA1  2. cervical funneling and short cervical length in this pregnancy, currently on vaginal progesterone and s/p Indocin 7d course within the last month for symtpomatic  contractions +  3. fibroid uterus in pregnancy, [id number] EDUIN left sided fibroid, symptomatic, s/p indocin   4. hx of C/S x1, 2019    ObHx:   G1: 2019, pCS, 39w, arrest of descent in 2nd stage of labor, otherwise uncomplicated   G2: 2020, chemical pregnancy, without subsequent D&C   GynHx:  Hx of abnormal pap; denies history of endometriosis, or ovarian cysts; denies history of STD's   PMH/PSH: LEEP , CS   Meds: PNV  Allergies: NKDA   SocHx: denies use of EtOH, illicit trugs, or tobacco during this pregnancy or prior; denies any hx of anxiety or depression; feels safe at home

## 2021-11-22 NOTE — OB PROVIDER DELIVERY SUMMARY - NSPROVIDERDELIVERYNOTE_OBGYN_ALL_OB_FT
P1 now  s/p uncomplicated repeat  and bilateral salpingectomy at 38w3d due to previous  in labor  Findings: female infant, poor tone, APGARs 3/7/8, weight 3330g. True knot in the umbilical cord. Large dominant left-fundal 6cm fibroid, otherwise grossly normal ovaries and fallopian tubes bilaterally. Vacuum extraction attempted but unable to keep suction.

## 2021-11-22 NOTE — DISCHARGE NOTE OB - CARE PLAN
Principal Discharge DX:	Status post repeat low transverse  section  Assessment and plan of treatment:	Please call your provider to schedule postoperative wound check visit in 2 weeks. Take medications as directed, regular diet, activity as tolerated. Exclusive breast feeding for the first 6 months is recommended. Nothing per vagina for 6 weeks (incl. sex, douching, etc). If you have additional concerns, please inform your provider.  Secondary Diagnosis:	Encounter for sterilization  Assessment and plan of treatment:	Pt underwent a bilateral salpingectomy.   1

## 2021-11-22 NOTE — DISCHARGE NOTE OB - HOSPITAL COURSE
Patient underwent a  section and bilateral salpingectomy. Post-partum course was uncomplicated. Pain is well controlled with PRN medication. She has no difficulty with ambulation, voiding, or PO intake. Lab values and vital signs are stable prior to discharge.

## 2021-11-22 NOTE — OB PROVIDER H&P - NSHPPHYSICALEXAM_GEN_ALL_CORE
ICU Vital Signs Last 24 Hrs  HR: 79 (22 Nov 2021 02:55) (79 - 79)  BP: 150/79 (22 Nov 2021 02:55) (150/79 - 150/79)    FHT: Cat 2  [place]: q3m  SVE: 7/100/-1

## 2021-11-22 NOTE — DISCHARGE NOTE OB - MEDICATION SUMMARY - MEDICATIONS TO TAKE
I will START or STAY ON the medications listed below when I get home from the hospital:    acetaminophen 325 mg oral tablet  -- 3 tab(s) by mouth every 6 hours   -- Indication: For postpartum pain    oxyCODONE 5 mg oral tablet  -- 1 tab(s) by mouth every 6 hours, As Needed -Moderate to Severe Pain (4-10) MDD:4 tabs  -- Indication: For postpartum pain    ferrous sulfate 325 mg (65 mg elemental iron) oral tablet  -- 1 tab(s) by mouth once a day  -- Indication: For anemia

## 2021-11-23 ENCOUNTER — APPOINTMENT (OUTPATIENT)
Dept: DISASTER EMERGENCY | Facility: CLINIC | Age: 36
End: 2021-11-23

## 2021-11-23 LAB
BASOPHILS # BLD AUTO: 0.03 K/UL — SIGNIFICANT CHANGE UP (ref 0–0.2)
BASOPHILS NFR BLD AUTO: 0.3 % — SIGNIFICANT CHANGE UP (ref 0–2)
COVID-19 NUCLEOCAPSID GAM AB INTERP: NEGATIVE — SIGNIFICANT CHANGE UP
COVID-19 NUCLEOCAPSID TOTAL GAM ANTIBODY RESULT: 0.68 INDEX — SIGNIFICANT CHANGE UP
EOSINOPHIL # BLD AUTO: 0.15 K/UL — SIGNIFICANT CHANGE UP (ref 0–0.5)
EOSINOPHIL NFR BLD AUTO: 1.3 % — SIGNIFICANT CHANGE UP (ref 0–6)
GLUCOSE BLDC GLUCOMTR-MCNC: 107 MG/DL — HIGH (ref 70–99)
HCT VFR BLD CALC: 25.3 % — LOW (ref 34.5–45)
HGB BLD-MCNC: 8.4 G/DL — LOW (ref 11.5–15.5)
IMM GRANULOCYTES NFR BLD AUTO: 0.7 % — SIGNIFICANT CHANGE UP (ref 0–1.5)
LYMPHOCYTES # BLD AUTO: 1.67 K/UL — SIGNIFICANT CHANGE UP (ref 1–3.3)
LYMPHOCYTES # BLD AUTO: 14.7 % — SIGNIFICANT CHANGE UP (ref 13–44)
MCHC RBC-ENTMCNC: 31.1 PG — SIGNIFICANT CHANGE UP (ref 27–34)
MCHC RBC-ENTMCNC: 33.2 GM/DL — SIGNIFICANT CHANGE UP (ref 32–36)
MCV RBC AUTO: 93.7 FL — SIGNIFICANT CHANGE UP (ref 80–100)
MONOCYTES # BLD AUTO: 0.68 K/UL — SIGNIFICANT CHANGE UP (ref 0–0.9)
MONOCYTES NFR BLD AUTO: 6 % — SIGNIFICANT CHANGE UP (ref 2–14)
NEUTROPHILS # BLD AUTO: 8.77 K/UL — HIGH (ref 1.8–7.4)
NEUTROPHILS NFR BLD AUTO: 77 % — SIGNIFICANT CHANGE UP (ref 43–77)
PLATELET # BLD AUTO: 250 K/UL — SIGNIFICANT CHANGE UP (ref 150–400)
RBC # BLD: 2.7 M/UL — LOW (ref 3.8–5.2)
RBC # FLD: 13.4 % — SIGNIFICANT CHANGE UP (ref 10.3–14.5)
SARS-COV-2 IGG+IGM SERPL QL IA: 0.68 INDEX — SIGNIFICANT CHANGE UP
SARS-COV-2 IGG+IGM SERPL QL IA: NEGATIVE — SIGNIFICANT CHANGE UP
WBC # BLD: 11.38 K/UL — HIGH (ref 3.8–10.5)
WBC # FLD AUTO: 11.38 K/UL — HIGH (ref 3.8–10.5)

## 2021-11-23 RX ORDER — IBUPROFEN 200 MG
600 TABLET ORAL EVERY 6 HOURS
Refills: 0 | Status: DISCONTINUED | OUTPATIENT
Start: 2021-11-23 | End: 2021-11-25

## 2021-11-23 RX ORDER — FERROUS SULFATE 325(65) MG
1 TABLET ORAL
Qty: 42 | Refills: 0
Start: 2021-11-23 | End: 2022-01-03

## 2021-11-23 RX ORDER — FERROUS SULFATE 325(65) MG
325 TABLET ORAL DAILY
Refills: 0 | Status: DISCONTINUED | OUTPATIENT
Start: 2021-11-23 | End: 2021-11-25

## 2021-11-23 RX ORDER — ACETAMINOPHEN 500 MG
3 TABLET ORAL
Qty: 60 | Refills: 0
Start: 2021-11-23 | End: 2021-11-27

## 2021-11-23 RX ORDER — OXYCODONE HYDROCHLORIDE 5 MG/1
1 TABLET ORAL
Qty: 12 | Refills: 0
Start: 2021-11-23 | End: 2021-11-25

## 2021-11-23 RX ADMIN — Medication 975 MILLIGRAM(S): at 09:55

## 2021-11-23 RX ADMIN — Medication 600 MILLIGRAM(S): at 17:41

## 2021-11-23 RX ADMIN — Medication 975 MILLIGRAM(S): at 21:38

## 2021-11-23 RX ADMIN — ENOXAPARIN SODIUM 40 MILLIGRAM(S): 100 INJECTION SUBCUTANEOUS at 14:53

## 2021-11-23 RX ADMIN — Medication 975 MILLIGRAM(S): at 14:54

## 2021-11-23 RX ADMIN — Medication 325 MILLIGRAM(S): at 12:52

## 2021-11-23 RX ADMIN — Medication 975 MILLIGRAM(S): at 04:05

## 2021-11-23 RX ADMIN — SIMETHICONE 80 MILLIGRAM(S): 80 TABLET, CHEWABLE ORAL at 12:53

## 2021-11-23 RX ADMIN — Medication 975 MILLIGRAM(S): at 10:28

## 2021-11-23 RX ADMIN — SIMETHICONE 80 MILLIGRAM(S): 80 TABLET, CHEWABLE ORAL at 06:21

## 2021-11-23 RX ADMIN — SIMETHICONE 80 MILLIGRAM(S): 80 TABLET, CHEWABLE ORAL at 00:08

## 2021-11-23 RX ADMIN — SIMETHICONE 80 MILLIGRAM(S): 80 TABLET, CHEWABLE ORAL at 17:41

## 2021-11-23 RX ADMIN — Medication 30 MILLIGRAM(S): at 00:03

## 2021-11-23 RX ADMIN — Medication 600 MILLIGRAM(S): at 12:53

## 2021-11-23 RX ADMIN — Medication 600 MILLIGRAM(S): at 06:21

## 2021-11-23 NOTE — PROGRESS NOTE ADULT - SUBJECTIVE AND OBJECTIVE BOX
JERMAINE PEDERSEN is a 35y  now POD#1 s/p repeat  section @38w3d GA, uncomplicated.    S:    Pt endorses abdominal gas pain radiating to her left shoulder. She states that she had similar pain yesterday radiating to her rt shoulder that resolved with simethicone.   Incisional pain controlled with current treatment regimen.   She is ambulating without difficulty and tolerating PO.   + flatus/-BM/+ voiding   She endorses appropriate lochia, which is decreasing.   She is pumping breastmilk for baby in NICU.  She denies fevers, chills, nausea and vomiting.   She denies lightheadedness, dizziness, palpitations, chest pain and SOB.     O:    T(C): 36.6 (21 @ 04:33), Max: 37.2 (21 @ 16:39)  HR: 88 (21 @ 04:33) (79 - 93)  BP: 116/72 (21 @ 04:33) (108/62 - 122/72)  RR: 18 (21 @ 04:33) (17 - 18)  SpO2: 98% (21 @ 04:33) (97% - 100%)    Gen: NAD, AOx3  Resp: breathing comfortably on RA  Abdomen:  Soft, non-tender, non-distended  Incision: Clean/dry/intact with steri strips in place   Uterus:  Fundus firm below umbilicus  VE:  Lochia as expected  LE:  Non-tender and non-edematous                          12.8   10.86 )-----------( 353      ( 2021 03:16 )             38.2         135  |  102  |  11.2  ----------------------------<  97  3.9   |  16.0<L>  |  0.51    Ca    9.1      2021 03:16    TPro  6.8  /  Alb  3.7  /  TBili  0.2<L>  /  DBili  x   /  AST  14  /  ALT  9   /  AlkPhos  213<H>   JERMAINE PEDERSEN is a 35y  now POD#1 s/p repeat  section @38w3d GA, uncomplicated.    S:    Pt endorses abdominal gas pain radiating to her left shoulder. She states that she had similar pain yesterday radiating to her rt shoulder that resolved with simethicone.   Incisional pain controlled with current treatment regimen.   She is ambulating without difficulty and tolerating PO.   + flatus/-BM/+ voiding   She endorses appropriate lochia, which is decreasing.   She is pumping breastmilk for baby in NICU.  She denies fevers, chills, nausea and vomiting.   She denies lightheadedness, dizziness, palpitations, chest pain and SOB.     O:    T(C): 36.6 (21 @ 04:33), Max: 37.2 (21 @ 16:39)  HR: 88 (21 @ 04:33) (79 - 93)  BP: 116/72 (21 @ 04:33) (108/62 - 122/72)  RR: 18 (21 @ 04:33) (17 - 18)  SpO2: 98% (21 @ 04:33) (97% - 100%)    Gen: NAD, AOx3  Resp: breathing comfortably on RA  Abdomen:  Soft, non-tender, non-distended  Incision: Clean/dry/intact with steri strips in place. Pressure dressing removed.  Uterus:  Fundus firm below umbilicus  VE:  Lochia as expected  LE:  Non-tender and non-edematous                          12.8   10.86 )-----------( 353      ( 2021 03:16 )             38.2         135  |  102  |  11.2  ----------------------------<  97  3.9   |  16.0<L>  |  0.51    Ca    9.1      2021 03:16    TPro  6.8  /  Alb  3.7  /  TBili  0.2<L>  /  DBili  x   /  AST  14  /  ALT  9   /  AlkPhos  213<H>   JERMAINE PEDERSEN is a 35y  now POD#1 s/p repeat  section @38w3d GA, uncomplicated.    S:    Pt endorses abdominal gas pain radiating to her left shoulder. She states that she had similar pain yesterday radiating to her rt shoulder that resolved with simethicone.   Incisional pain controlled with current treatment regimen.   She is ambulating without difficulty and tolerating PO.   + flatus/-BM/+ voiding   She endorses appropriate lochia, which is decreasing.   She is pumping breastmilk for baby in NICU.  She denies fevers, chills, nausea and vomiting.   She denies lightheadedness, dizziness, palpitations, chest pain and SOB.     O:    T(C): 36.6 (21 @ 04:33), Max: 37.2 (21 @ 16:39)  HR: 88 (21 @ 04:33) (79 - 93)  BP: 116/72 (21 @ 04:33) (108/62 - 122/72)  RR: 18 (21 @ 04:33) (17 - 18)  SpO2: 98% (21 @ 04:33) (97% - 100%)    Gen: NAD, AOx3  Resp: breathing comfortably on RA  Abdomen:  Soft, appropriately tender, +distended  Incision: Clean/dry/intact with steri strips in place. Pressure dressing removed.  Uterus:  Fundus firm below umbilicus  VE:  Lochia as expected  LE:  Non-tender and non-edematous                          12.8   10.86 )-----------( 353      ( 2021 03:16 )             38.2         135  |  102  |  11.2  ----------------------------<  97  3.9   |  16.0<L>  |  0.51    Ca    9.1      2021 03:16    TPro  6.8  /  Alb  3.7  /  TBili  0.2<L>  /  DBili  x   /  AST  14  /  ALT  9   /  AlkPhos  213<H>   JERMAINE PEDERSEN is a 35y  now POD#1 s/p repeat  section @38w3d GA, uncomplicated.    S:    Pt endorses abdominal gas pain radiating to her left shoulder. She states that she had similar pain yesterday radiating to her rt shoulder that resolved with simethicone.   Incisional pain controlled with current treatment regimen.   She is ambulating without difficulty and tolerating PO.   - flatus/-BM/+ voiding   She endorses appropriate lochia, which is decreasing.   She is pumping breastmilk for baby in NICU.  She denies fevers, chills, nausea and vomiting.   She denies lightheadedness, dizziness, palpitations, chest pain and SOB.     O:    T(C): 36.6 (21 @ 04:33), Max: 37.2 (21 @ 16:39)  HR: 88 (21 @ 04:33) (79 - 93)  BP: 116/72 (21 @ 04:33) (108/62 - 122/72)  RR: 18 (21 @ 04:33) (17 - 18)  SpO2: 98% (21 @ 04:33) (97% - 100%)    Gen: NAD, AOx3  Resp: breathing comfortably on RA  Abdomen:  Soft, appropriately tender, +distended  Incision: Clean/dry/intact with steri strips in place. Pressure dressing removed.  Uterus:  Fundus firm below umbilicus  VE:  Lochia as expected  LE:  Non-tender and non-edematous                          12.8   10.86 )-----------( 353      ( 2021 03:16 )             38.2         135  |  102  |  11.2  ----------------------------<  97  3.9   |  16.0<L>  |  0.51    Ca    9.1      2021 03:16    TPro  6.8  /  Alb  3.7  /  TBili  0.2<L>  /  DBili  x   /  AST  14  /  ALT  9   /  AlkPhos  213<H>

## 2021-11-24 ENCOUNTER — APPOINTMENT (OUTPATIENT)
Dept: ANTEPARTUM | Facility: CLINIC | Age: 36
End: 2021-11-24

## 2021-11-24 ENCOUNTER — APPOINTMENT (OUTPATIENT)
Dept: OBGYN | Facility: CLINIC | Age: 36
End: 2021-11-24

## 2021-11-24 RX ORDER — SODIUM CHLORIDE 9 MG/ML
1000 INJECTION, SOLUTION INTRAVENOUS ONCE
Refills: 0 | Status: DISCONTINUED | OUTPATIENT
Start: 2021-11-24 | End: 2021-11-24

## 2021-11-24 RX ADMIN — SIMETHICONE 80 MILLIGRAM(S): 80 TABLET, CHEWABLE ORAL at 12:02

## 2021-11-24 RX ADMIN — Medication 975 MILLIGRAM(S): at 03:14

## 2021-11-24 RX ADMIN — Medication 600 MILLIGRAM(S): at 17:19

## 2021-11-24 RX ADMIN — ENOXAPARIN SODIUM 40 MILLIGRAM(S): 100 INJECTION SUBCUTANEOUS at 15:08

## 2021-11-24 RX ADMIN — Medication 600 MILLIGRAM(S): at 05:37

## 2021-11-24 RX ADMIN — Medication 975 MILLIGRAM(S): at 15:07

## 2021-11-24 RX ADMIN — Medication 975 MILLIGRAM(S): at 09:02

## 2021-11-24 RX ADMIN — Medication 325 MILLIGRAM(S): at 12:02

## 2021-11-24 RX ADMIN — SIMETHICONE 80 MILLIGRAM(S): 80 TABLET, CHEWABLE ORAL at 03:17

## 2021-11-24 RX ADMIN — Medication 600 MILLIGRAM(S): at 12:11

## 2021-11-24 RX ADMIN — Medication 600 MILLIGRAM(S): at 23:54

## 2021-11-24 RX ADMIN — SIMETHICONE 80 MILLIGRAM(S): 80 TABLET, CHEWABLE ORAL at 20:57

## 2021-11-24 RX ADMIN — Medication 600 MILLIGRAM(S): at 00:08

## 2021-11-24 NOTE — PROGRESS NOTE ADULT - SUBJECTIVE AND OBJECTIVE BOX
JERMAINE PEDERSEN is a 35y  now POD#2 s/p repeat  section @38w3d GA, uncomplicated.    S:    Pt has no complaints.  She is ambulating without difficulty and tolerating PO.   + flatus/-BM/+ voiding   She endorses appropriate lochia, which is decreasing.   She is pumping breastmilk for baby in NICU.  She denies fevers, chills, nausea and vomiting.   She denies lightheadedness, dizziness, palpitations, chest pain and SOB.     O:    Vital Signs Last 24 Hrs  T(C): 36.7 (2021 04:30), Max: 37.1 (2021 17:55)  T(F): 98 (2021 04:30), Max: 98.8 (2021 17:55)  HR: 78 (2021 04:30) (78 - 91)  BP: 132/75 (2021 04:30) (109/73 - 132/75)  RR: 18 (2021 04:30) (18 - 18)  SpO2: 98% (2021 04:30) (97% - 98%)    Gen: NAD, AOx3  Resp: breathing comfortably on RA  Abdomen:  Soft, appropriately tender, nondistended  Incision: Clean/dry/intact with steri strips in place.  Uterus:  Fundus firm below umbilicus  VE:  Lochia as expected  LE:  Non-tender and non-edematous                                      8.4    11.38 )-----------( 250      ( 2021 06:03 )             25.3

## 2021-11-25 VITALS
OXYGEN SATURATION: 97 % | TEMPERATURE: 98 F | HEART RATE: 72 BPM | DIASTOLIC BLOOD PRESSURE: 68 MMHG | RESPIRATION RATE: 18 BRPM | SYSTOLIC BLOOD PRESSURE: 108 MMHG

## 2021-11-25 LAB
HCT VFR BLD CALC: 24.5 % — LOW (ref 34.5–45)
HGB BLD-MCNC: 7.9 G/DL — LOW (ref 11.5–15.5)
MCHC RBC-ENTMCNC: 30.5 PG — SIGNIFICANT CHANGE UP (ref 27–34)
MCHC RBC-ENTMCNC: 32.2 GM/DL — SIGNIFICANT CHANGE UP (ref 32–36)
MCV RBC AUTO: 94.6 FL — SIGNIFICANT CHANGE UP (ref 80–100)
PLATELET # BLD AUTO: 294 K/UL — SIGNIFICANT CHANGE UP (ref 150–400)
RBC # BLD: 2.59 M/UL — LOW (ref 3.8–5.2)
RBC # FLD: 13.3 % — SIGNIFICANT CHANGE UP (ref 10.3–14.5)
WBC # BLD: 9.06 K/UL — SIGNIFICANT CHANGE UP (ref 3.8–10.5)
WBC # FLD AUTO: 9.06 K/UL — SIGNIFICANT CHANGE UP (ref 3.8–10.5)

## 2021-11-25 PROCEDURE — 88307 TISSUE EXAM BY PATHOLOGIST: CPT

## 2021-11-25 PROCEDURE — 59050 FETAL MONITOR W/REPORT: CPT

## 2021-11-25 PROCEDURE — 36415 COLL VENOUS BLD VENIPUNCTURE: CPT

## 2021-11-25 PROCEDURE — 86901 BLOOD TYPING SEROLOGIC RH(D): CPT

## 2021-11-25 PROCEDURE — 85025 COMPLETE CBC W/AUTO DIFF WBC: CPT

## 2021-11-25 PROCEDURE — 93010 ELECTROCARDIOGRAM REPORT: CPT

## 2021-11-25 PROCEDURE — 80053 COMPREHEN METABOLIC PANEL: CPT

## 2021-11-25 PROCEDURE — 87635 SARS-COV-2 COVID-19 AMP PRB: CPT

## 2021-11-25 PROCEDURE — 86850 RBC ANTIBODY SCREEN: CPT

## 2021-11-25 PROCEDURE — 86769 SARS-COV-2 COVID-19 ANTIBODY: CPT

## 2021-11-25 PROCEDURE — 88302 TISSUE EXAM BY PATHOLOGIST: CPT

## 2021-11-25 PROCEDURE — 85027 COMPLETE CBC AUTOMATED: CPT

## 2021-11-25 PROCEDURE — 93005 ELECTROCARDIOGRAM TRACING: CPT

## 2021-11-25 PROCEDURE — 86780 TREPONEMA PALLIDUM: CPT

## 2021-11-25 PROCEDURE — 86900 BLOOD TYPING SEROLOGIC ABO: CPT

## 2021-11-25 PROCEDURE — 82962 GLUCOSE BLOOD TEST: CPT

## 2021-11-25 RX ORDER — LIDOCAINE 4 G/100G
1 CREAM TOPICAL EVERY 24 HOURS
Refills: 0 | Status: DISCONTINUED | OUTPATIENT
Start: 2021-11-25 | End: 2021-11-25

## 2021-11-25 RX ADMIN — LIDOCAINE 1 PATCH: 4 CREAM TOPICAL at 07:50

## 2021-11-25 RX ADMIN — Medication 975 MILLIGRAM(S): at 03:16

## 2021-11-25 RX ADMIN — Medication 975 MILLIGRAM(S): at 09:35

## 2021-11-25 RX ADMIN — Medication 600 MILLIGRAM(S): at 12:23

## 2021-11-25 RX ADMIN — Medication 325 MILLIGRAM(S): at 12:23

## 2021-11-25 NOTE — PROGRESS NOTE ADULT - ASSESSMENT
A/P:  JERMAINE PEDERSEN is a 35y  now POD#1 s/p repeat  section @38w3d GA, uncomplicated.  -Vital signs stable  -Hgb: 12.8 -> AM labs pending   -simethicone prn  -Voiding, tolerating PO  -Advance care as tolerated   -VTE ppx: encourage ambulation, SCDs while in bed, daily lovenox  -Continue routine postpartum and postoperative care and education  -Female infant in NICU.  -Dispo: Patient to be discharged when meeting all postpartum and postoperative milestones and pending attending approval.  
A/P:  JERMAINE PEDERSEN is a 35y  now POD#3 s/p repeat  section @38w3d GA, uncomplicated.  -Vital signs stable  -Hgb: 12.8 -> 8.4 -> 7.9. Iron supplementation  -Voiding, tolerating PO, bowel function nml.   -Advance care as tolerated   -VTE ppx: encourage ambulation, SCDs while in bed, daily lovenox  -Continue routine postpartum and postoperative care and education  -Female infant in NICU.  -Dispo: Patient likely dc today, pending attending approval.  
A/P:  JERMAINE PEDERSEN is a 35y  now POD#2 s/p repeat  section @38w3d GA, uncomplicated.  -Vital signs stable  -Hgb: 12.8 -> 8.4. Iron supplementation  -Voiding, tolerating PO  -Advance care as tolerated   -VTE ppx: encourage ambulation, SCDs while in bed, daily lovenox  -Continue routine postpartum and postoperative care and education  -Female infant in NICU.  -Dispo: Patient likely dc today, pending attending approval.

## 2021-11-25 NOTE — PROGRESS NOTE ADULT - SUBJECTIVE AND OBJECTIVE BOX
JERMAINE PEDERSEN is a 35y  now POD#3 s/p repeat  section @38w3d GA, uncomplicated.    S:    Pt endorsed chest palpitations earlier positionally. Now at time of interview states symptoms have resolved. Denies lightheadedness, dizziness, CP or SOB.   She is ambulating without difficulty and tolerating PO.   + flatus/+BM/+ voiding   She endorses appropriate lochia, which is decreasing.   She is pumping breastmilk for baby in NICU.  She denies fevers, chills, nausea and vomiting.       O:    Vital Signs Last 24 Hrs  T(C): 36.6 (2021 03:24), Max: 36.9 (2021 15:20)  T(F): 97.9 (2021 03:24), Max: 98.5 (2021 21:52)  HR: 69 (2021 03:24) (69 - 87)  BP: 135/84 (2021 03:24) (127/79 - 139/82)  BP(mean): --  RR: 18 (2021 03:24) (16 - 18)  SpO2: 98% (2021 03:24) (98% - 100%)    Gen: NAD, AOx3  Resp: breathing comfortably on RA  Abdomen:  Soft, appropriately tender, nondistended  Incision: Clean/dry/intact with steri strips in place.  Uterus:  Fundus firm below umbilicus  VE:  Lochia as expected  LE:  Non-tender and non-edematous                                      8.4    11.38 )-----------( 250      ( 2021 06:03 )             25.3                           7.9    9.06  )-----------( 294      ( 2021 01:06 )             24.5

## 2021-11-25 NOTE — CHART NOTE - NSCHARTNOTEFT_GEN_A_CORE
Patient is a 36yo  s/p uncomplicated repeat , POD #2  MD called to bedside as patient is endorsing palpitations and chest pressure and general weakness/malaise.   Patient seen and examined at bedside. States when she lays down, she feels chest pressure, like she cannot take a full breath  Patient also states she had palpitations before the pregnancy which resolved spontaneously. She saw a cardiologist and the workup was negative.   Patient denies any dyspnea or CP on exertion, headache, dizziness, nausea or vomiting.   Patient also endorsing lower back pain.     Vital Signs Last 24 Hrs  T(C): 36.6 (2021 03:24), Max: 36.9 (2021 15:20)  T(F): 97.9 (2021 03:24), Max: 98.5 (2021 21:52)  HR: 69 (2021 03:24) (69 - 87)  BP: 135/84 (2021 03:24) (127/79 - 139/82)  RR: 18 (2021 03:24) (16 - 18)  SpO2: 98% (2021 03:24) (98% - 100%)    Hemoglobin: 7.9 g/dL (11.25.21 @ 01:06)    Symptoms above likely due to acute blood loss anemia and hemodilution, Hgb went from 12.8 to 7.9  Due to symptomatic anemia, offered patient blood transfusion and IV fluid hydration. She would like to try and see if the symptoms resolve and before taking more invasive treatments.   In the mean time, will obtain EKG and prescribe lidocaine patch for her lower back.    d/w Dr. Dey

## 2021-11-25 NOTE — PROGRESS NOTE ADULT - ATTENDING COMMENTS
POD#2 from RCS in labor, difficult delivery, baby in NICU  Doing well postop  Discharge planning tomorrow   Eliza Stearns
Patient seen and examined by me.  Agree with resident note.  Palpitations resolved.  She states she thinks it was anxiety.  Pain well controlled.  Tolerating regular diet, no nausea or vomiting.  Breastfeeding.  Minimal lochia.  Ambulating.  She is voiding without difficulty.  Passing flatus.  Denies HA, dizziness, CP, SOB, LE pain.  VSS.  Incision c/d/i.  Plan for DC home today.  DC instructions and call parameters reviewed.  RTO in 1 week for incision check.
resident note reviewed  patient doing well  VS and labs reviewed                        8.4    11.38 )-----------( 250      ( 23 Nov 2021 06:03 )             25.3     POD#1 s/p repeat csection - stable  anemia - secondary to blood loss - asymptomatic, no acute interventions  encourage ambulation  routine postop care

## 2021-11-26 ENCOUNTER — APPOINTMENT (OUTPATIENT)
Dept: OBGYN | Facility: CLINIC | Age: 36
End: 2021-11-26

## 2021-12-06 ENCOUNTER — APPOINTMENT (OUTPATIENT)
Dept: OBGYN | Facility: CLINIC | Age: 36
End: 2021-12-06
Payer: COMMERCIAL

## 2021-12-06 VITALS
DIASTOLIC BLOOD PRESSURE: 72 MMHG | BODY MASS INDEX: 30.21 KG/M2 | WEIGHT: 160 LBS | HEIGHT: 61 IN | SYSTOLIC BLOOD PRESSURE: 116 MMHG

## 2021-12-06 PROCEDURE — 0503F POSTPARTUM CARE VISIT: CPT

## 2021-12-06 RX ORDER — PROGESTERONE 100 MG/1
100 INSERT VAGINAL
Qty: 3 | Refills: 2 | Status: DISCONTINUED | COMMUNITY
Start: 2021-08-09 | End: 2021-12-06

## 2021-12-09 DIAGNOSIS — R68.89 OTHER GENERAL SYMPTOMS AND SIGNS: ICD-10-CM

## 2021-12-10 LAB — SURGICAL PATHOLOGY STUDY: SIGNIFICANT CHANGE UP

## 2021-12-18 NOTE — END OF VISIT
[FreeTextEntry3] : I, [Roel Benavidez] solely acted as scribe for Dr. Jayant Jackson on 12/06/2021.\par All medical entries made by the Scribe were at my, Dr. Jackson’s, direction and personally\par dictated by me on 12/06/2021.  I have reviewed the chart and agree that the record\par accurately reflects my personal performance of the history, physical exam, assessment and plan. I\par have also personally directed, reviewed, and agreed with the chart.

## 2021-12-18 NOTE — DISCUSSION/SUMMARY
[FreeTextEntry1] : -Incision site healed well. No sign of infection. Steri strips removed today. Recommended Scar Away. \par \par -Advised in the future to do some abdominal exercises and possibly consider a tummy tuck.\par \par -Follow up in 6 weeks for annual exam or as needed.\par \par During this visit 20 minutes were spent face-to-face with greater than 50% of the time dedicated\par to counseling.

## 2021-12-18 NOTE — HISTORY OF PRESENT ILLNESS
[Patient reported PAP Smear was normal] : Patient reported PAP Smear was normal [N] : Patient is not sexually active [Menarche Age: ____] : age at menarche was [unfilled] [No] : Patient does not have concerns regarding sex [Previously active] : previously active [PapSmeardate] : 01/26/19 [TextBox_31] : NEG [GonorrheaDate] : 04/01/19 [TextBox_63] : NEG [ChlamydiaDate] : 04/01/19 [TextBox_68] : NEG [LMPDate] : 02/26/21 [PGHxTotal] : 3 [[facility] 2 [[facility] [PGHxABSpont] : 1 [FreeTextEntry1] : 02/26/21

## 2022-01-17 ENCOUNTER — APPOINTMENT (OUTPATIENT)
Dept: OBGYN | Facility: CLINIC | Age: 37
End: 2022-01-17
Payer: COMMERCIAL

## 2022-01-17 ENCOUNTER — APPOINTMENT (OUTPATIENT)
Dept: ANTEPARTUM | Facility: CLINIC | Age: 37
End: 2022-01-17
Payer: COMMERCIAL

## 2022-01-17 ENCOUNTER — ASOB RESULT (OUTPATIENT)
Age: 37
End: 2022-01-17

## 2022-01-17 VITALS
DIASTOLIC BLOOD PRESSURE: 66 MMHG | SYSTOLIC BLOOD PRESSURE: 124 MMHG | BODY MASS INDEX: 30.96 KG/M2 | HEIGHT: 61 IN | WEIGHT: 164 LBS

## 2022-01-17 DIAGNOSIS — D50.9 IRON DEFICIENCY ANEMIA, UNSPECIFIED: ICD-10-CM

## 2022-01-17 PROCEDURE — 76830 TRANSVAGINAL US NON-OB: CPT

## 2022-01-17 PROCEDURE — 36415 COLL VENOUS BLD VENIPUNCTURE: CPT

## 2022-01-17 PROCEDURE — 99395 PREV VISIT EST AGE 18-39: CPT

## 2022-01-17 RX ORDER — BLOOD-GLUCOSE METER
W/DEVICE KIT MISCELLANEOUS
Qty: 1 | Refills: 0 | Status: DISCONTINUED | COMMUNITY
Start: 2021-08-26 | End: 2022-01-17

## 2022-01-17 RX ORDER — ALCOHOL ANTISEPTIC PADS
PADS, MEDICATED (EA) TOPICAL
Qty: 1 | Refills: 3 | Status: DISCONTINUED | COMMUNITY
Start: 2021-08-26 | End: 2022-01-17

## 2022-01-17 RX ORDER — INSULIN HUMAN 100 [IU]/ML
100 INJECTION, SUSPENSION SUBCUTANEOUS AT BEDTIME
Qty: 1 | Refills: 6 | Status: DISCONTINUED | COMMUNITY
Start: 2021-09-09 | End: 2022-01-17

## 2022-01-17 RX ORDER — PEN NEEDLE, DIABETIC 29 G X1/2"
31G X 8 MM NEEDLE, DISPOSABLE MISCELLANEOUS
Qty: 3 | Refills: 3 | Status: DISCONTINUED | COMMUNITY
Start: 2021-09-09 | End: 2022-01-17

## 2022-01-17 RX ORDER — BLOOD SUGAR DIAGNOSTIC
STRIP MISCELLANEOUS
Qty: 100 | Refills: 2 | Status: DISCONTINUED | COMMUNITY
Start: 2021-08-26 | End: 2022-01-17

## 2022-01-17 NOTE — END OF VISIT
[FreeTextEntry3] : I, Razramon Sevilla solely acted as scribe for Dr. Jayant Jackson on 01/17/2022.\par All medical entries made by the Scribe were at my, Dr. Jackson’s, direction and personally\par dictated by me on 01/17/2022.  I have reviewed the chart and agree that the record\par accurately reflects my personal performance of the history, physical exam, assessment and plan. I\par have also personally directed, reviewed, and agreed with the chart.

## 2022-01-17 NOTE — DISCUSSION/SUMMARY
[FreeTextEntry1] : During this visit comprehensive counseling was given regarding the following concerns:\par \par 1 We discussed the need for proper nutrition and exercise. This includes cardiovascular and\par pelvic floor exercise.\par \par 2 We discussed the importance of maintaining a proper vaccination schedule and we discussed\par the utility of the flu vaccine and TDaP.\par \par 3 We discussed the impact of chronic sun exposure and the risk for skin disease as a result. The\par benefits of a total body scan by a Dermatologist was reviewed..\par \par 4 We discussed the need for certain supplements for most people which include vitamin D3,\par calcium rich foods with limitation on calcium supplementation by tabular form to 600 mg by\par mouth daily. As part of a bone health program we recommend weight bearing exercises, and\par some limited sun exposure (10-15 minutes daily) to help convert vitamin D to its active form.\par \par 5 We discussed my recommendation for the Mirena IUD to control her menses. Patient will schedule Mirena IUD insertion. Mirena IUD pamphlet given.\par \par 6 Benign breast and pelvic exam. Pap smear collected. \par \par 7 Prescription for a transvaginal sonogram ordered to follow up on previous uterine fibroid. Sonogram results reviewed with patient. \par \par 8 Prescription for CBC and 2 hour Glucola blood work ordered today.\par \par During this visit 20 minutes were spent face-to-face with greater than 50% of the time dedicated\par to counseling.

## 2022-01-17 NOTE — REVIEW OF SYSTEMS
(denies numbness/tingling)  · Tone: Normal  · Coordination: WFL   · Perception: WNL    Activities of Daily Living (ADLs):  · Feeding: Independent  · Grooming: SBA (completed seated grooming task of brushing hair, facial hygiene, and oral hygiene tasks of brushing/rinsing seated EOB; unable to tolerate in standing this date)  · UB bathing: SBA   · LB bathing: Max A (reaching distal LEs/bottom, posterior thighs; will need long handled sponge at this time due to posterior hip precautions)  · UB dressing: SBA  · LB dressing: Dependent (donning BL socks; will need LB AE at this time due to posterior hip precautions)  · Toileting: Dependent (Simpson in place, total assist would be required for a bowel movement)    Cognitive and Psychosocial Functioning:  · Overall cognitive status: WFL (slightly decreased pain tolerance this date, will improve with time)  · Affect: Normal     Balance:   · Sitting: SBA in unsupported sitting EOB  · Standing: Min A with RW (x2 trials; decreased trunk extension/upright posture requiring verbal and tactile cues for correction)    Functional Mobility:  · Bed Mobility: Max A x 2 supine to sitting EOB, Max A x 2 sitting EOB to supine (HOB elevated to 40', painful throughout, max coaching required)  · Transfers: Min A sit to stand from bed, Min A stand to sit to bed (min cues for technique/safe hand placement each direction)  · Ambulation: Unsafe/unable this date      AM-PAC 6 click short form for inpatient daily activity:   How much help from another person does the patient currently need. .. Unable  Dep A Lot  Max A A Lot   Mod A A Little  Min A A Little   CGA  SBA None   Mod I  Indep  Sup   1. Putting on and taking off regular lower body clothing? [x] 1    [] 2   [] 2   [] 3   [] 3   [] 4      2. Bathing (including washing, rinsing, drying)? [] 1   [] 2   [x] 2 [] 3 [] 3 [] 4   3. Toileting, which includes using toilet, bedpan, or urinal? [x] 1    [] 2   [] 2   [] 3   [] 3   [] 4     4.  Putting on and taking off regular upper body clothing? [] 1   [] 2   [] 2   [] 3   [x] 3    [] 4      5. Taking care of personal grooming such as brushing teeth? [] 1   [] 2    [] 2 [] 3    [x] 3   [] 4      6. Eating meals? [] 1   [] 2   [] 2   [] 3   [] 3   [x] 4      Raw Score:  14    [24=0% impaired(CH), 23=1-19%(CI), 20-22=20-39%(CJ), 15-19=40-59%(CK), 10-14=60-79%(CL), 7-9=80-99%(CM), 6=100%(CN)]     Treatment:  Therapeutic Activity Training:   Therapeutic activity training was instructed today. Cues were given for safety, sequence, UE/LE placement, awareness, and balance. Activities performed today included bed mobility training, sitting balance/tolerance, transfer training, standing tolerance with RW, education on role of OT, POC, WBAT status Rt LE, Posterior hip precautions, importance of EOB/OOB activity, d/c planning  Self Care Training:   Cues were given for safety, sequence, UE/LE placement, visual cues, and balance. Activities performed today included UB/LB dressing tasks, grooming, facial hygiene, oral hygiene routine, education on modifying LB ADLs with AE due to posterior hip precautions      Safety Measures: Gait belt used, Left in Bed, Alarm in place    Assessment:  Pt is an [de-identified]year old female with a past medical history of Acid reflux, Arthritis, CHF (congestive heart failure) ([address] Chronic kidney disease, GERD (gastroesophageal reflux disease), History of blood transfusion, Hypertension, Hypomagnesemia, Hypothyroidism, MRSA (methicillin resistant staph aureus) culture positive, Shortness of breath on exertion, SVT (supraventricular tachycardia) ([address] Teeth missing, and Wears glasses. Pt admitted following a fall and diagnosed with a Rt femoral neck fracture. Pt underwent Rt hip hemiarthroplasty on 10-10. Pt lives at home alone and at baseline is independent with ADLs (supervision with bathing) and ambulates mod I with a RW.  Pt currently presents with the above impairments, and is unsafe/unable to return immediately home. Recommend continued OT services in inpatient rehab setting at discharge. Complexity: Moderate  Prognosis: Good  Plan: 4x/week      Goals:  1. Pt will complete all aspects of bed mobility for EOB/OOB ADLs mod A  2. Pt will complete UB/LB bathing min A with setup using long handled sponge   3. Pt will complete all aspects of LB dressing mod A with setup using LB AE   4. Pt will complete all functional transfers to and from bed, chair, toilet, shower chair CGA/good safety awareness  5. Pt will ambulate HH distance to bathroom for toileting min A with RW  6. Pt will complete all aspects of toileting task mod A  7. Pt will complete oral hygiene/grooming routine in standing at sink CGA with setup  8.  Pt will verbalize/be compliant with 3/3 posterior hip precautions 100% of the time      Time:   Time in: 923  Time out: 1005  Timed treatment minutes: 27  Total time: 42      Electronically signed by:    NICOLAS Zuleta/L, [place], .[id number] [Patient Intake Form Reviewed] : Patient intake form was reviewed

## 2022-01-17 NOTE — HISTORY OF PRESENT ILLNESS
[Tubal Occlusion] : has had a tubal occlusion [Y] : Positive pregnancy history [Menarche Age: ____] : age at menarche was [unfilled] [Currently Active] : currently active [PapSmeardate] : 1/26/19 [TextBox_31] : neg [GonorrheaDate] : 4/01/19 [TextBox_63] : neg [ChlamydiaDate] : 4/01/19 [TextBox_68] : neg [LMPDate] : 1/09/22 [PGHxTotal] : 3 [[facility]] : 2 [[facility]] : 2 [PGHxABSpont] : 1 [FreeTextEntry1] : 1/09/22

## 2022-01-18 LAB — HPV HIGH+LOW RISK DNA PNL CVX: NOT DETECTED

## 2022-01-20 LAB — CYTOLOGY CVX/VAG DOC THIN PREP: NORMAL

## 2022-03-16 NOTE — DISCHARGE NOTE ANTEPARTUM - PLAN OF CARE
53 1) Continue vaginal progesterone as prescribed  2) Return to L&D if you are experiencing regular painful contractions, increased pressure, large gush of fluid, or heavy vaginal bleeding  3) Monitor fetal kick counts and return to L&D if you are experiencing decreased fetal movement for several hours 1) Please take your blood sugar at home before meals in the morning, and 1 hour after meals  2) On your diabetes log please record what you are eating and your fingersticks  3) You will start with insulin 16u at night.  4) Follow up with MFM within a week to review glucose logs and determine whether dose needs to be increased or decreased  5) Follow up with your regular OBGYN as scheduled

## 2022-03-30 ENCOUNTER — APPOINTMENT (OUTPATIENT)
Dept: DERMATOLOGY | Facility: CLINIC | Age: 37
End: 2022-03-30

## 2022-04-07 DIAGNOSIS — R30.0 DYSURIA: ICD-10-CM

## 2022-06-07 ENCOUNTER — APPOINTMENT (OUTPATIENT)
Dept: ORTHOPEDIC SURGERY | Facility: CLINIC | Age: 37
End: 2022-06-07

## 2022-06-07 ENCOUNTER — APPOINTMENT (OUTPATIENT)
Dept: ORTHOPEDIC SURGERY | Facility: CLINIC | Age: 37
End: 2022-06-07
Payer: COMMERCIAL

## 2022-06-07 VITALS
WEIGHT: 164 LBS | SYSTOLIC BLOOD PRESSURE: 134 MMHG | HEIGHT: 61 IN | DIASTOLIC BLOOD PRESSURE: 83 MMHG | HEART RATE: 79 BPM | BODY MASS INDEX: 30.96 KG/M2

## 2022-06-07 DIAGNOSIS — M25.522 PAIN IN LEFT ELBOW: ICD-10-CM

## 2022-06-07 PROCEDURE — 99213 OFFICE O/P EST LOW 20 MIN: CPT

## 2022-06-07 PROCEDURE — 73080 X-RAY EXAM OF ELBOW: CPT | Mod: LT

## 2022-06-07 NOTE — ASSESSMENT
[FreeTextEntry1] : Patient presents with left elbow pain. Their symptoms are consistent with a left elbow strain. The nature of this condition was described at length with the patient and they verbalized understanding. \par \par Recommendations: \par -Diclofenac\par -If no improvement or stiffening in 2 weeks, will start PT. Otherwise, continue to work on HEP (Described to patient tonight)\par -Followup as needed\par \par The patient was in full agreement with this plan and appreciative of their care.\par

## 2022-06-07 NOTE — PHYSICAL EXAM
[Normal Finger/nose] : finger to nose coordination [Normal] : no peripheral adenopathy appreciated [de-identified] : Left Elbow Exam\par \par Skin: Intact with no erythema, no ecchymosis, no palpable effusion.\par ROM: 0 to 140 degrees of flexion. There is 80 degrees of pronation and 80 degrees of supination. Pain with supination and full extension.\par Stability: Test for instability negative including posterolateral rotatory instability and valgus instability.\par Tenderness: No TTP along the posterior lateral or posterior medial joint line.  No TTP of biceps and triceps. No TTP over medial and lateral epicondyles. Positive pain over proximal forearm musculature. Negative Tinel's over the ulnar n.  \par Strength: 5/5 strength with no pain with forced flexion or extension of elbow.  No evidence of intrinsic or extrinsic atrophy.\par Sensation: Grossly intact without deficits.\par \par  [de-identified] : GABINOR [de-identified] : 3 xray views of the left elbow were obtained. No fractures or dislocations.

## 2022-06-07 NOTE — HISTORY OF PRESENT ILLNESS
[FreeTextEntry1] : 06/07/2022: Patient is a 36 year-old, right-hand-dominant female who presents to the office today for initial evaluation of a left elbow injury. She notes that she was trying to skateboard and she fell backwards, landing on her outstretched elbow. She has pain over the proximal ulnar aspect of the elbow. She initially had wrist pain, but the elbow is where the majority of her pain is. It is constant in nature. She has tried OTC NSAIDs which give temporary relief. She has no complaints of paresthesias in the left hand. She presents today for further treatment options.\par

## 2022-06-13 ENCOUNTER — NON-APPOINTMENT (OUTPATIENT)
Age: 37
End: 2022-06-13

## 2022-06-13 PROBLEM — M25.522 LEFT ELBOW PAIN: Status: ACTIVE | Noted: 2022-06-07

## 2022-07-05 NOTE — OB PST NOTE - NS PRO ABUSE SCREEN AFRAID ANYONE YN
no Detail Level: Generalized Detail Level: Detailed Patient Specific Counseling (Will Not Stick From Patient To Patient): Spot of concern Detail Level: Simple Patient Specific Counseling (Will Not Stick From Patient To Patient): Use TAC as directed. Use unscented moisturizer

## 2022-08-18 DIAGNOSIS — O24.414 GESTATIONAL DIABETES MELLITUS IN PREGNANCY, INSULIN CONTROLLED: ICD-10-CM

## 2022-08-18 DIAGNOSIS — O09.523 SUPERVISION OF ELDERLY MULTIGRAVIDA, THIRD TRIMESTER: ICD-10-CM

## 2022-08-18 DIAGNOSIS — O26.873 CERVICAL SHORTENING, THIRD TRIMESTER: ICD-10-CM

## 2022-08-18 DIAGNOSIS — L65.9 NONSCARRING HAIR LOSS, UNSPECIFIED: ICD-10-CM

## 2022-08-18 DIAGNOSIS — S46.912D STRAIN OF UNSPECIFIED MUSCLE, FASCIA AND TENDON AT SHOULDER AND UPPER ARM LEVEL, LEFT ARM, SUBSEQUENT ENCOUNTER: ICD-10-CM

## 2022-08-18 DIAGNOSIS — Z23 ENCOUNTER FOR IMMUNIZATION: ICD-10-CM

## 2022-08-18 DIAGNOSIS — O99.210 OBESITY COMPLICATING PREGNANCY, UNSPECIFIED TRIMESTER: ICD-10-CM

## 2022-08-18 DIAGNOSIS — Z34.93 ENCOUNTER FOR SUPERVISION OF NORMAL PREGNANCY, UNSPECIFIED, THIRD TRIMESTER: ICD-10-CM

## 2022-08-18 DIAGNOSIS — Z87.42 PERSONAL HISTORY OF OTHER DISEASES OF THE FEMALE GENITAL TRACT: ICD-10-CM

## 2022-08-18 DIAGNOSIS — Z01.419 ENCOUNTER FOR GYNECOLOGICAL EXAMINATION (GENERAL) (ROUTINE) W/OUT ABNORMAL FINDINGS: ICD-10-CM

## 2022-08-18 DIAGNOSIS — Z01.818 ENCOUNTER FOR OTHER PREPROCEDURAL EXAMINATION: ICD-10-CM

## 2022-08-19 LAB
BASOPHILS # BLD AUTO: 0.06 K/UL
BASOPHILS NFR BLD AUTO: 0.9 %
EOSINOPHIL # BLD AUTO: 0.21 K/UL
EOSINOPHIL NFR BLD AUTO: 3 %
HCT VFR BLD CALC: 39.6 %
HGB BLD-MCNC: 11.9 G/DL
IMM GRANULOCYTES NFR BLD AUTO: 0.3 %
LYMPHOCYTES # BLD AUTO: 2 K/UL
LYMPHOCYTES NFR BLD AUTO: 28.8 %
MAN DIFF?: NORMAL
MCHC RBC-ENTMCNC: 28.1 PG
MCHC RBC-ENTMCNC: 30.1 GM/DL
MCV RBC AUTO: 93.4 FL
MONOCYTES # BLD AUTO: 0.31 K/UL
MONOCYTES NFR BLD AUTO: 4.5 %
NEUTROPHILS # BLD AUTO: 4.34 K/UL
NEUTROPHILS NFR BLD AUTO: 62.5 %
PLATELET # BLD AUTO: 348 K/UL
RBC # BLD: 4.24 M/UL
RBC # FLD: 14.1 %
T3FREE SERPL-MCNC: 3.5 PG/ML
T4 SERPL-MCNC: 8 UG/DL
TSH SERPL-ACNC: 0.94 UIU/ML
WBC # FLD AUTO: 6.94 K/UL

## 2022-09-20 ENCOUNTER — APPOINTMENT (OUTPATIENT)
Dept: INTERNAL MEDICINE | Facility: CLINIC | Age: 37
End: 2022-09-20
Payer: COMMERCIAL

## 2022-09-20 ENCOUNTER — NON-APPOINTMENT (OUTPATIENT)
Age: 37
End: 2022-09-20

## 2022-09-20 VITALS
OXYGEN SATURATION: 98 % | RESPIRATION RATE: 15 BRPM | BODY MASS INDEX: 30.21 KG/M2 | DIASTOLIC BLOOD PRESSURE: 80 MMHG | TEMPERATURE: 97.5 F | WEIGHT: 160 LBS | HEART RATE: 79 BPM | HEIGHT: 61 IN | SYSTOLIC BLOOD PRESSURE: 112 MMHG

## 2022-09-20 DIAGNOSIS — Z00.00 ENCOUNTER FOR GENERAL ADULT MEDICAL EXAMINATION W/OUT ABNORMAL FINDINGS: ICD-10-CM

## 2022-09-20 DIAGNOSIS — Z80.0 FAMILY HISTORY OF MALIGNANT NEOPLASM OF DIGESTIVE ORGANS: ICD-10-CM

## 2022-09-20 DIAGNOSIS — E66.9 OBESITY, UNSPECIFIED: ICD-10-CM

## 2022-09-20 PROCEDURE — 36415 COLL VENOUS BLD VENIPUNCTURE: CPT

## 2022-09-20 PROCEDURE — G0444 DEPRESSION SCREEN ANNUAL: CPT

## 2022-09-20 PROCEDURE — 93000 ELECTROCARDIOGRAM COMPLETE: CPT | Mod: 59

## 2022-09-20 PROCEDURE — 99385 PREV VISIT NEW AGE 18-39: CPT | Mod: 25

## 2022-09-20 PROCEDURE — G0447 BEHAVIOR COUNSEL OBESITY 15M: CPT | Mod: 59

## 2022-09-20 RX ORDER — NITROFURANTOIN (MONOHYDRATE/MACROCRYSTALS) 25; 75 MG/1; MG/1
100 CAPSULE ORAL
Qty: 10 | Refills: 4 | Status: DISCONTINUED | COMMUNITY
Start: 2022-04-07 | End: 2022-09-20

## 2022-09-20 RX ORDER — DROSPIRENONE AND ETHINYL ESTRADIOL 0.03MG-3MG
3-0.03 KIT ORAL DAILY
Qty: 3 | Refills: 1 | Status: DISCONTINUED | COMMUNITY
Start: 2022-08-31 | End: 2022-09-20

## 2022-09-20 RX ORDER — DICLOFENAC SODIUM 75 MG/1
75 TABLET, DELAYED RELEASE ORAL
Qty: 60 | Refills: 1 | Status: DISCONTINUED | COMMUNITY
Start: 2022-06-07 | End: 2022-09-20

## 2022-09-20 NOTE — ASSESSMENT
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 Needs also to follow a caloric deficit. He will start using [employer] it sim to log food and track calories, carbs and fats. Good sources of good fats such as avocado, nuts, fish, good source of protein eggs, white part of chicken, beef from time to time. Good source of carbs low carb wraps, green veggies etc... also needs to start exercising. Recommended around 45 min 4-5 times a week of moderate intensity work outs. Patient understood and agreed with plan. \par \par Hx gestational diabetes \par Educated patient that she is at higher risk of developing diabetes, must follow low carb diet \par Will check a1c \par Discussed diabetic diet. Carb allowance of around 130-140 g carbs daily. \par \par Fu routine bw \par \par RTO 3 MO \par

## 2022-09-20 NOTE — HEALTH RISK ASSESSMENT
[Excellent] : ~his/her~  mood as  excellent [Never] : Never [No] : In the past 12 months have you used drugs other than those required for medical reasons? No [No falls in past year] : Patient reported no falls in the past year [0] : 2) Feeling down, depressed, or hopeless: Not at all (0) [PHQ-2 Negative - No further assessment needed] : PHQ-2 Negative - No further assessment needed [Patient reported PAP Smear was normal] : Patient reported PAP Smear was normal [HIV Test offered] : HIV Test offered [Hepatitis C test offered] : Hepatitis C test offered [None] : None [With Family] : lives with family [# of Members in Household ___] :  household currently consist of [unfilled] member(s) [Employed] : employed [] :  [# Of Children ___] : has [unfilled] children [Sexually Active] : sexually active [Feels Safe at Home] : Feels safe at home [Fully functional (bathing, dressing, toileting, transferring, walking, feeding)] : Fully functional (bathing, dressing, toileting, transferring, walking, feeding) [Fully functional (using the telephone, shopping, preparing meals, housekeeping, doing laundry, using] : Fully functional and needs no help or supervision to perform IADLs (using the telephone, shopping, preparing meals, housekeeping, doing laundry, using transportation, managing medications and managing finances) [With Patient/Caregiver] : , with patient/caregiver [Reviewed updated] : Reviewed, updated [Designated Healthcare Proxy] : Designated healthcare proxy [Name: ___] : Health Care Proxy's Name: [unfilled]  [Relationship: ___] : Relationship: [unfilled] [Aggressive treatment] : aggressive treatment [de-identified] : dOES NOT WORK OUT  [de-identified] : Not good  [[id number]] : 0 [Language] : denies difficulty with language [Behavior] : denies difficulty with behavior [Learning/Retaining New Information] : denies difficulty learning/retaining new information [Handling Complex Tasks] : denies difficulty handling complex tasks [Reasoning] : denies difficulty with reasoning [Spatial Ability and Orientation] : denies difficulty with spatial ability and orientation [High Risk Behavior] : no high risk behavior [Reports changes in hearing] : Reports no changes in hearing [Reports changes in vision] : Reports no changes in vision [PapSmearDate] : 2022 [FreeTextEntry3] :  [AdvancecareDate] : 9/20/2022

## 2022-09-20 NOTE — HISTORY OF PRESENT ILLNESS
[FreeTextEntry1] : CPE  [de-identified] : Gloria is a 35 yo F w PMHx gestational diabetes with both pregnancies, insulin dependent during last pregnancy, obesity \par \par Specialist \par Ob gyn- Dr. Caceres \par \par Feels well and denies any other acute complaints \par \par HCM \par Pap smear 2022- normal \par Tdap 2021 \par Flu vaccine- will be having with work

## 2022-09-20 NOTE — COUNSELING
[Potential consequences of obesity discussed] : Potential consequences of obesity discussed [Benefits of weight loss discussed] : Benefits of weight loss discussed [Encouraged to maintain food diary] : Encouraged to maintain food diary [Encouraged to increase physical activity] : Encouraged to increase physical activity [Encouraged to use exercise tracking device] : Encouraged to use exercise tracking device [Weigh Self Weekly] : weigh self weekly [Decrease Portions] : decrease portions [____ min/wk Activity] : [unfilled] min/wk activity [Keep Food Diary] : keep food diary [Good understanding] : Patient has a good understanding of disease, goals and obesity follow-up plan [FreeTextEntry2] :  Explained amount of protein fat and carbs that are allowed on eat meal. Needs also to follow a caloric deficit. He will start using lose it sim to log food and track calories, carbs and fats. Good sources of good fats such as avocado, nuts, fish, good source of protein eggs, white part of chicken, beef from time to time. Good source of carbs low carb wraps, green veggies etc... also needs to start exercising. Recommended around 45 min 4-5 times a week of moderate intensity work outs. Patient understood and agreed with plan.\par  [FreeTextEntry4] : 15

## 2022-09-20 NOTE — ASSESSMENT
[FreeTextEntry1] : Gloria is a 37 yo F w PMHx gestational diabetes with both pregnancies, insulin dependent during last pregnancy, obesity \par \par Specialist \par Ob gyn- Dr. Caceres \par \par HCM \par Pap smear 2022- normal \par Tdap 2021 \par Flu vaccine- will be having with work\par EKG low voltage anterior leads likely lead placement, not complaining of any cardiac symptoms \par \par Obesity \par  Explained amount of protein fat and carbs that are allowed on eat meal. Needs also to follow a caloric deficit. He will start using [employer] it sim to log food and track calories, carbs and fats. Good sources of good fats such as avocado, nuts, fish, good source of protein eggs, white part of chicken, beef from time to time. Good source of carbs low carb wraps, green veggies etc... also needs to start exercising. Recommended around 45 min 4-5 times a week of moderate intensity work outs. Patient understood and agreed with plan. \par \par Hx gestational diabetes \par Educated patient that she is at higher risk of developing diabetes, must follow low carb diet \par Will check a1c \par Discussed diabetic diet. Carb allowance of around 130-140 g carbs daily. \par \par Fu routine bw \par \par RTO 3 MO \par

## 2022-09-20 NOTE — HISTORY OF PRESENT ILLNESS
[FreeTextEntry1] : CPE  [de-identified] : Gloria is a 37 yo F w PMHx gestational diabetes with both pregnancies, insulin dependent during last pregnancy, obesity \par \par Specialist \par Ob gyn- Dr. Caceres \par \par Feels well and denies any other acute complaints \par \par HCM \par Pap smear 2022- normal \par Tdap 2021 \par Flu vaccine- will be having with work

## 2022-09-20 NOTE — HEALTH RISK ASSESSMENT
[Excellent] : ~his/her~  mood as  excellent [Never] : Never [No] : In the past 12 months have you used drugs other than those required for medical reasons? No [No falls in past year] : Patient reported no falls in the past year [0] : 2) Feeling down, depressed, or hopeless: Not at all (0) [PHQ-2 Negative - No further assessment needed] : PHQ-2 Negative - No further assessment needed [Patient reported PAP Smear was normal] : Patient reported PAP Smear was normal [HIV Test offered] : HIV Test offered [Hepatitis C test offered] : Hepatitis C test offered [None] : None [With Family] : lives with family [# of Members in Household ___] :  household currently consist of [unfilled] member(s) [Employed] : employed [] :  [# Of Children ___] : has [unfilled] children [Sexually Active] : sexually active [Feels Safe at Home] : Feels safe at home [Fully functional (bathing, dressing, toileting, transferring, walking, feeding)] : Fully functional (bathing, dressing, toileting, transferring, walking, feeding) [Fully functional (using the telephone, shopping, preparing meals, housekeeping, doing laundry, using] : Fully functional and needs no help or supervision to perform IADLs (using the telephone, shopping, preparing meals, housekeeping, doing laundry, using transportation, managing medications and managing finances) [With Patient/Caregiver] : , with patient/caregiver [Reviewed updated] : Reviewed, updated [Designated Healthcare Proxy] : Designated healthcare proxy [Name: ___] : Health Care Proxy's Name: [unfilled]  [Relationship: ___] : Relationship: [unfilled] [Aggressive treatment] : aggressive treatment [de-identified] : dOES NOT WORK OUT  [de-identified] : Not good  [[id number]] : 0 [Language] : denies difficulty with language [Behavior] : denies difficulty with behavior [Learning/Retaining New Information] : denies difficulty learning/retaining new information [Handling Complex Tasks] : denies difficulty handling complex tasks [Reasoning] : denies difficulty with reasoning [Spatial Ability and Orientation] : denies difficulty with spatial ability and orientation [High Risk Behavior] : no high risk behavior [Reports changes in hearing] : Reports no changes in hearing [Reports changes in vision] : Reports no changes in vision [PapSmearDate] : 2022 [FreeTextEntry3] :  [AdvancecareDate] : 9/20/2022

## 2022-09-20 NOTE — PHYSICAL EXAM
[No Abdominal Bruit] : a ~M bruit was not heard ~T in the abdomen [No Varicosities] : no varicosities [No Edema] : there was no peripheral edema [No Palpable Aorta] : no palpable aorta [Coordination Grossly Intact] : coordination grossly intact [No Focal Deficits] : no focal deficits [Normal Gait] : normal gait [Normal] : affect was normal and insight and judgment were intact

## 2022-09-21 DIAGNOSIS — R79.89 OTHER SPECIFIED ABNORMAL FINDINGS OF BLOOD CHEMISTRY: ICD-10-CM

## 2022-09-21 LAB
25(OH)D3 SERPL-MCNC: 16.4 NG/ML
ALBUMIN SERPL ELPH-MCNC: 5 G/DL
ALP BLD-CCNC: 92 U/L
ALT SERPL-CCNC: 37 U/L
ANION GAP SERPL CALC-SCNC: 14 MMOL/L
APPEARANCE: CLEAR
AST SERPL-CCNC: 28 U/L
BACTERIA: NEGATIVE
BASOPHILS # BLD AUTO: 0.05 K/UL
BASOPHILS NFR BLD AUTO: 0.8 %
BILIRUB SERPL-MCNC: 0.4 MG/DL
BILIRUBIN URINE: NEGATIVE
BLOOD URINE: ABNORMAL
BUN SERPL-MCNC: 7 MG/DL
CALCIUM SERPL-MCNC: 9.5 MG/DL
CHLORIDE SERPL-SCNC: 103 MMOL/L
CHOLEST SERPL-MCNC: 180 MG/DL
CO2 SERPL-SCNC: 23 MMOL/L
COLOR: COLORLESS
CREAT SERPL-MCNC: 0.63 MG/DL
EGFR: 119 ML/MIN/1.73M2
EOSINOPHIL # BLD AUTO: 0.34 K/UL
EOSINOPHIL NFR BLD AUTO: 5.2 %
ESTIMATED AVERAGE GLUCOSE: 117 MG/DL
GLUCOSE QUALITATIVE U: NEGATIVE
GLUCOSE SERPL-MCNC: 136 MG/DL
HBA1C MFR BLD HPLC: 5.7 %
HCT VFR BLD CALC: 38.3 %
HCV AB SER QL: NONREACTIVE
HCV S/CO RATIO: 0.09 S/CO
HDLC SERPL-MCNC: 45 MG/DL
HGB BLD-MCNC: 12.1 G/DL
HYALINE CASTS: 0 /LPF
IMM GRANULOCYTES NFR BLD AUTO: 0.3 %
KETONES URINE: NEGATIVE
LDLC SERPL CALC-MCNC: 112 MG/DL
LEUKOCYTE ESTERASE URINE: NEGATIVE
LYMPHOCYTES # BLD AUTO: 1.56 K/UL
LYMPHOCYTES NFR BLD AUTO: 23.7 %
MAN DIFF?: NORMAL
MCHC RBC-ENTMCNC: 28.9 PG
MCHC RBC-ENTMCNC: 31.6 GM/DL
MCV RBC AUTO: 91.4 FL
MICROSCOPIC-UA: NORMAL
MONOCYTES # BLD AUTO: 0.53 K/UL
MONOCYTES NFR BLD AUTO: 8.1 %
NEUTROPHILS # BLD AUTO: 4.08 K/UL
NEUTROPHILS NFR BLD AUTO: 61.9 %
NITRITE URINE: NEGATIVE
NONHDLC SERPL-MCNC: 134 MG/DL
PH URINE: 6
PLATELET # BLD AUTO: 316 K/UL
POTASSIUM SERPL-SCNC: 3.8 MMOL/L
PROT SERPL-MCNC: 7.4 G/DL
PROTEIN URINE: NEGATIVE
RBC # BLD: 4.19 M/UL
RBC # FLD: 14.2 %
RED BLOOD CELLS URINE: 5 /HPF
SODIUM SERPL-SCNC: 139 MMOL/L
SPECIFIC GRAVITY URINE: 1.01
SQUAMOUS EPITHELIAL CELLS: 1 /HPF
TRIGL SERPL-MCNC: 113 MG/DL
TSH SERPL-ACNC: 0.62 UIU/ML
UROBILINOGEN URINE: NORMAL
WBC # FLD AUTO: 6.58 K/UL
WHITE BLOOD CELLS URINE: 1 /HPF

## 2022-09-22 LAB — HIV1+2 AB SPEC QL IA.RAPID: NONREACTIVE

## 2022-09-23 ENCOUNTER — NON-APPOINTMENT (OUTPATIENT)
Age: 37
End: 2022-09-23

## 2022-10-18 NOTE — MEDICAL STUDENT PROGRESS NOTE(EDUCATION) - NS MD HP STUD ASPLAN ASSES FT
A/P: Patient is a 33y  s/p pCS now POD2 -- doing well postpartum A/P: Patient is a 33y  s/p pCS for failure of descent now POD2 -- doing well postpartum Oxybutynin Counseling:  I discussed with the patient the risks of oxybutynin including but not limited to skin rash, drowsiness, dry mouth, difficulty urinating, and blurred vision.

## 2022-11-03 DIAGNOSIS — D25.9 LEIOMYOMA OF UTERUS, UNSPECIFIED: ICD-10-CM

## 2022-11-16 DIAGNOSIS — Z30.011 ENCOUNTER FOR INITIAL PRESCRIPTION OF CONTRACEPTIVE PILLS: ICD-10-CM

## 2022-11-22 ENCOUNTER — APPOINTMENT (OUTPATIENT)
Dept: ANTEPARTUM | Facility: CLINIC | Age: 37
End: 2022-11-22

## 2022-11-22 ENCOUNTER — ASOB RESULT (OUTPATIENT)
Age: 37
End: 2022-11-22

## 2022-11-22 ENCOUNTER — APPOINTMENT (OUTPATIENT)
Dept: OBGYN | Facility: CLINIC | Age: 37
End: 2022-11-22

## 2022-11-22 VITALS
DIASTOLIC BLOOD PRESSURE: 66 MMHG | WEIGHT: 161 LBS | HEIGHT: 61 IN | BODY MASS INDEX: 30.4 KG/M2 | SYSTOLIC BLOOD PRESSURE: 108 MMHG

## 2022-11-22 DIAGNOSIS — Z32.02 ENCOUNTER FOR PREGNANCY TEST, RESULT NEGATIVE: ICD-10-CM

## 2022-11-22 DIAGNOSIS — Z30.430 ENCOUNTER FOR INSERTION OF INTRAUTERINE CONTRACEPTIVE DEVICE: ICD-10-CM

## 2022-11-22 LAB
HCG UR QL: NEGATIVE
QUALITY CONTROL: YES

## 2022-11-22 PROCEDURE — 76830 TRANSVAGINAL US NON-OB: CPT

## 2022-11-22 PROCEDURE — 58300 INSERT INTRAUTERINE DEVICE: CPT

## 2022-11-22 PROCEDURE — 81025 URINE PREGNANCY TEST: CPT

## 2022-11-22 PROCEDURE — 76857 US EXAM PELVIC LIMITED: CPT | Mod: 59

## 2022-11-22 NOTE — PROCEDURE
[IUD Placement] : intrauterine device (IUD) placement [Consent Obtained] : Consent obtained [Abnormal Uterine Bleeding] : abnormal uterine bleeding [Risks] : risks [Benefits] : benefits [Alternatives] : alternatives [Patient] : patient [Neg Pregnancy Test] : negative pregnancy test [Betadine] : Betadine [Tenaculum] : Tenaculum [Easy Passage] : Easy passage [Sounded to ___ cm] : sounded to [unfilled] ~Ucm [Post Placement Transvag. US] : post placement transvaginal ultrasound [Mirena IUD] : Mirena IUD [US Guidance] : US Guidance [Tolerated Well] : Patient tolerated the procedure well [No Complications] : No complications [None] : None [History of Unprotected [place]] : no history of unprotected intercourse [LMPDate] : 11/14/22 [de-identified] : [id number] [de-identified] : 1/2025 [de-identified] : 2027

## 2022-11-23 LAB
C TRACH RRNA SPEC QL NAA+PROBE: NOT DETECTED
N GONORRHOEA RRNA SPEC QL NAA+PROBE: NOT DETECTED
SOURCE AMPLIFICATION: NORMAL

## 2023-01-03 DIAGNOSIS — Z20.822 CONTACT WITH AND (SUSPECTED) EXPOSURE TO COVID-19: ICD-10-CM

## 2023-01-09 LAB — SARS-COV-2 N GENE NPH QL NAA+PROBE: DETECTED

## 2023-02-01 ENCOUNTER — NON-APPOINTMENT (OUTPATIENT)
Age: 38
End: 2023-02-01

## 2023-02-11 DIAGNOSIS — N89.8 OTHER SPECIFIED NONINFLAMMATORY DISORDERS OF VAGINA: ICD-10-CM

## 2023-03-06 ENCOUNTER — APPOINTMENT (OUTPATIENT)
Dept: INTERNAL MEDICINE | Facility: CLINIC | Age: 38
End: 2023-03-06

## 2023-03-09 NOTE — COUNSELING
Well-controlled on metoprolol [Potential consequences of obesity discussed] : Potential consequences of obesity discussed [Benefits of weight loss discussed] : Benefits of weight loss discussed [Encouraged to maintain food diary] : Encouraged to maintain food diary [Encouraged to increase physical activity] : Encouraged to increase physical activity [Encouraged to use exercise tracking device] : Encouraged to use exercise tracking device [Weigh Self Weekly] : weigh self weekly [Decrease Portions] : decrease portions [____ min/wk Activity] : [unfilled] min/wk activity [Keep Food Diary] : keep food diary [Good understanding] : Patient has a good understanding of disease, goals and obesity follow-up plan [FreeTextEntry2] :  Explained amount of protein fat and carbs that are allowed on eat meal. Needs also to follow a caloric deficit. He will start using lose it sim to log food and track calories, carbs and fats. Good sources of good fats such as avocado, nuts, fish, good source of protein eggs, white part of chicken, beef from time to time. Good source of carbs low carb wraps, green veggies etc... also needs to start exercising. Recommended around 45 min 4-5 times a week of moderate intensity work outs. Patient understood and agreed with plan.\par  [FreeTextEntry4] : 15

## 2023-03-23 ENCOUNTER — APPOINTMENT (OUTPATIENT)
Dept: OBGYN | Facility: CLINIC | Age: 38
End: 2023-03-23
Payer: COMMERCIAL

## 2023-03-23 PROCEDURE — 90686 IIV4 VACC NO PRSV 0.5 ML IM: CPT

## 2023-03-23 PROCEDURE — 90471 IMMUNIZATION ADMIN: CPT

## 2023-03-29 ENCOUNTER — APPOINTMENT (OUTPATIENT)
Dept: INTERNAL MEDICINE | Facility: CLINIC | Age: 38
End: 2023-03-29

## 2023-04-27 ENCOUNTER — APPOINTMENT (OUTPATIENT)
Dept: INTERNAL MEDICINE | Facility: CLINIC | Age: 38
End: 2023-04-27
Payer: COMMERCIAL

## 2023-04-27 VITALS — SYSTOLIC BLOOD PRESSURE: 124 MMHG | DIASTOLIC BLOOD PRESSURE: 80 MMHG | HEIGHT: 61 IN | HEART RATE: 63 BPM

## 2023-04-27 DIAGNOSIS — R53.83 OTHER FATIGUE: ICD-10-CM

## 2023-04-27 DIAGNOSIS — Z87.898 PERSONAL HISTORY OF OTHER SPECIFIED CONDITIONS: ICD-10-CM

## 2023-04-27 DIAGNOSIS — G44.209 TENSION-TYPE HEADACHE, UNSPECIFIED, NOT INTRACTABLE: ICD-10-CM

## 2023-04-27 PROCEDURE — 36415 COLL VENOUS BLD VENIPUNCTURE: CPT

## 2023-04-27 PROCEDURE — 99214 OFFICE O/P EST MOD 30 MIN: CPT | Mod: 25

## 2023-04-27 RX ORDER — IBUPROFEN 400 MG/1
400 TABLET, FILM COATED ORAL
Qty: 21 | Refills: 0 | Status: ACTIVE | COMMUNITY
Start: 2023-04-27 | End: 1900-01-01

## 2023-04-27 NOTE — PHYSICAL EXAM
[No Abdominal Bruit] : a ~M bruit was not heard ~T in the abdomen [No Varicosities] : no varicosities [No Edema] : there was no peripheral edema [No Palpable Aorta] : no palpable aorta [Coordination Grossly Intact] : coordination grossly intact [No Focal Deficits] : no focal deficits [Normal Gait] : normal gait [Deep Tendon Reflexes (DTR)] : deep tendon reflexes were 2+ and symmetric [Normal] : affect was normal and insight and judgment were intact

## 2023-04-27 NOTE — ASSESSMENT
[FreeTextEntry1] : Gloria is a 35 yo F w PMHx gestational diabetes with both pregnancies, insulin dependent during last pregnancy, obesity \par c/o feeling very tired. Feels exhausted. Almost feels like when she was pregnant during the first trimester. \par Symptoms have been going on for the past two months \par On Sunday she had a bad headaches, nausea/vomiting\par Located on occipital region but then became generalized. Felt lightheaded. Felt that squeezing her head made her feel better. \par Headaches have come and go since first episode \par Denies any light sensitivity. Did not improve with excedrin \par Has fhx migraines- \par \par Working same schedule and hours. Had mirena IUD in november but symptoms just started two months ago. \par Patient bilateral salpingectomy\par \par Admits that her eating habits are not the best. Spends prolonged periods of time w/o eating. Coffee/ICed tea (has always been a caffeine person) \par Admits that she does not drink enough water. \par Stress levels- high but not unusual. \par Denies any recent URI symptoms (just really bad allergies, has to take zyzal everynight) \par \par Fatigue \par fu bw r/o mono/anemia/hypothyroidism \par \par Tension type headaches \par discussed adequate nutrition \par avoiding prolonged periods of times w/o eating \par discussed proper hydration 2.5L daily \par stress techniques discussed \par will start with ibuprofen if fails to respond will consider TCA vs CT head \par \par rto 2 weeks

## 2023-04-27 NOTE — HISTORY OF PRESENT ILLNESS
[FreeTextEntry1] : obesity, fu vit D  [de-identified] : Gloria is a 37 yo F w PMHx gestational diabetes with both pregnancies, insulin dependent during last pregnancy, obesity \par c/o feeling very tired. Feels exhausted. Almost feels like when she was pregnant during the first trimester. \par Symptoms have been going on for the past two months \par On Sunday she had a bad headaches, nausea/vomiting\par Located on occipital region but then became generalized. Felt lightheaded. Felt that squeezing her head made her feel better. \par Headaches have come and go since first episode \par Denies any light sensitivity. Did not improve with excedrin \par Has fhx migraines- \par \par Working same schedule and hours. Had mirena IUD in november but symptoms just started two months ago. \par Patient bilateral salpingectomy\par \par Admits that her eating habits are not the best. Spends prolonged periods of time w/o eating. Coffee/ICed tea (has always been a caffeine person) \par Admits that she does not drink enough water. \par Stress levels- high but not unusual. \par Denies any recent URI symptoms (just really bad allergies, has to take zyzal everynight)

## 2023-05-01 LAB
25(OH)D3 SERPL-MCNC: 15 NG/ML
ALBUMIN SERPL ELPH-MCNC: 5 G/DL
ALP BLD-CCNC: 101 U/L
ALT SERPL-CCNC: 23 U/L
ANION GAP SERPL CALC-SCNC: 16 MMOL/L
AST SERPL-CCNC: 16 U/L
BASOPHILS # BLD AUTO: 0.08 K/UL
BASOPHILS NFR BLD AUTO: 0.8 %
BILIRUB SERPL-MCNC: 0.2 MG/DL
BUN SERPL-MCNC: 10 MG/DL
CALCIUM SERPL-MCNC: 10 MG/DL
CHLORIDE SERPL-SCNC: 103 MMOL/L
CO2 SERPL-SCNC: 22 MMOL/L
CREAT SERPL-MCNC: 0.89 MG/DL
EGFR: 86 ML/MIN/1.73M2
EOSINOPHIL # BLD AUTO: 0.31 K/UL
EOSINOPHIL NFR BLD AUTO: 3.2 %
ESTIMATED AVERAGE GLUCOSE: 126 MG/DL
GLUCOSE SERPL-MCNC: 81 MG/DL
HBA1C MFR BLD HPLC: 6 %
HCT VFR BLD CALC: 39.8 %
HETEROPH AB SER QL: NEGATIVE
HGB BLD-MCNC: 13 G/DL
IMM GRANULOCYTES NFR BLD AUTO: 0.3 %
LYMPHOCYTES # BLD AUTO: 2.75 K/UL
LYMPHOCYTES NFR BLD AUTO: 28.4 %
MAN DIFF?: NORMAL
MCHC RBC-ENTMCNC: 30.1 PG
MCHC RBC-ENTMCNC: 32.7 GM/DL
MCV RBC AUTO: 92.1 FL
MONOCYTES # BLD AUTO: 0.49 K/UL
MONOCYTES NFR BLD AUTO: 5.1 %
NEUTROPHILS # BLD AUTO: 6.03 K/UL
NEUTROPHILS NFR BLD AUTO: 62.2 %
PLATELET # BLD AUTO: 361 K/UL
POTASSIUM SERPL-SCNC: 4.2 MMOL/L
PROT SERPL-MCNC: 7.5 G/DL
RBC # BLD: 4.32 M/UL
RBC # FLD: 13 %
SODIUM SERPL-SCNC: 141 MMOL/L
TSH SERPL-ACNC: 0.78 UIU/ML
WBC # FLD AUTO: 9.69 K/UL

## 2023-05-12 ENCOUNTER — APPOINTMENT (OUTPATIENT)
Dept: INTERNAL MEDICINE | Facility: CLINIC | Age: 38
End: 2023-05-12

## 2023-05-18 ENCOUNTER — NON-APPOINTMENT (OUTPATIENT)
Age: 38
End: 2023-05-18

## 2023-05-30 NOTE — OB RN PATIENT PROFILE - PAIN RATING/NUMBER SCALE (0-10): REST
7 Sotyktu Counseling:  I discussed the most common side effects of Sotyktu including: common cold, sore throat, sinus infections, cold sores, canker sores, folliculitis, and acne.? I also discussed more serious side effects of Sotyktu including but not limited to: serious allergic reactions; increased risk for infections such as TB; cancers such as lymphomas; rhabdomyolysis and elevated CPK; and elevated triglycerides and liver enzymes.?

## 2023-06-01 NOTE — HISTORY OF PRESENT ILLNESS
[FreeTextEntry1] : FU TENSION HEADAHCES  [de-identified] : Gloria is a 37 yo F w PMHx gestational diabetes with both pregnancies, insulin dependent during last pregnancy, obesity

## 2023-06-02 ENCOUNTER — APPOINTMENT (OUTPATIENT)
Dept: INTERNAL MEDICINE | Facility: CLINIC | Age: 38
End: 2023-06-02

## 2023-06-22 ENCOUNTER — APPOINTMENT (OUTPATIENT)
Dept: ORTHOPEDIC SURGERY | Facility: CLINIC | Age: 38
End: 2023-06-22
Payer: COMMERCIAL

## 2023-06-22 VITALS
HEART RATE: 76 BPM | HEIGHT: 61 IN | BODY MASS INDEX: 31.15 KG/M2 | DIASTOLIC BLOOD PRESSURE: 76 MMHG | WEIGHT: 165 LBS | SYSTOLIC BLOOD PRESSURE: 122 MMHG | TEMPERATURE: 98.1 F

## 2023-06-22 DIAGNOSIS — M79.641 PAIN IN RIGHT HAND: ICD-10-CM

## 2023-06-22 DIAGNOSIS — M79.644 PAIN IN RIGHT FINGER(S): ICD-10-CM

## 2023-06-22 PROCEDURE — 99213 OFFICE O/P EST LOW 20 MIN: CPT

## 2023-06-22 PROCEDURE — 73130 X-RAY EXAM OF HAND: CPT | Mod: RT

## 2023-06-22 NOTE — DISCUSSION/SUMMARY
[de-identified] : 25 minutes was spent reviewing the x-rays as well as discussing with the patient their clinical presentation, diagnosis and providing education.  The x-rays reviewed the patient.  No significant degenerative changes are appreciated.  No fractures.  The patient reports that her condition is improving since the onset.  She is recommended to continue the use of the thumb spica wrist immobilizer for another few days.  She is given a physical therapy prescription for hand PT.  If she has not improved sufficiently 1 week's time she will begin outpatient therapy.  If she continues to have discomfort or cramping sensation she will return back to see the hand specialist in about a month's time.  She will continue with activities as tolerated.  All questions were answered to the patient's satisfaction.

## 2023-06-22 NOTE — HISTORY OF PRESENT ILLNESS
[de-identified] : Patient presents today for initial evaluation of right thumb pain.  She has had it for about 2 days now.  She is not exactly sure what caused the pain.  No recalled specific trauma.  She states she began using a previous thumb spica wrist brace for similar pain in the past.  She states over the past 12 to 24 hours the pain is markedly improved.  The pain is primarily located at the first MCP joint.  No pain of the IP or CMC joint.  She also reports of pain in the first webspace.  She describes it as a cramp particularly when using a mouse or writing.  This also has improved.  She is right-hand dominant.  No sensory changes are associated.\par \par Review of Systems-\par Constitutional: No fever or chills. \par Cardiovascular: No orthopnea or chest pain\par Pulmonary: No shortness of breath. \par GI: No nausea or vomiting or abdominal pain.\par Musculoskeletal: see HPI \par Psychiatric: No anxiety and depression.

## 2023-06-22 NOTE — PHYSICAL EXAM
[de-identified] : The patient is conversive and in no apparent distress. The patient is alert and oriented to person, place, and time. Affect and mood appear normal. The head is normocephalic and atraumatic. Skin shows normal turgor with no evidence of eczema or psoriasis. No respiratory distress noted. Sensation grossly intact. MUSCULOSKELETAL:   SEE BELOW\par \par Right hand exam demonstrates normal alignment of the wrist and fingers.  No signs of acute trauma.  No surgical scars.  No erythema or ecchymosis.  Normal range of motion of all fingers.  No locking, guarding or stiffness.  No specific pain to palpation of the thumb joints or first webspace is noted.  No nodules.  Normal temperature.  2+ radial pulse.  Normal sensation to light touch. [de-identified] : Three-view right hand x-rays were reviewed.  Normal bony alignment.  Normal joint spacing.  No significant joint space narrowing or large osteophyte formation.  Normal sesamoid locations.  No retained hardware.

## 2023-06-23 PROBLEM — M79.641 RIGHT HAND PAIN: Status: ACTIVE | Noted: 2023-06-22

## 2023-06-30 ENCOUNTER — APPOINTMENT (OUTPATIENT)
Dept: ORTHOPEDIC SURGERY | Facility: CLINIC | Age: 38
End: 2023-06-30

## 2023-07-19 NOTE — OB RN PATIENT PROFILE - TEACHING/LEARNING LEARNING PREFERENCES
Orbicularis Oris Muscle Flap Text: The defect edges were debeveled with a #15 scalpel blade.  Given that the defect affected the competency of the oral sphincter an orbicularis oris muscle flap was deemed most appropriate to restore this competency and normal muscle function.  Using a sterile surgical marker, an appropriate flap was drawn incorporating the defect. The area thus outlined was incised with a #15 scalpel blade. Following this, the designed flap was carried over into the primary defect and sutured into place. skill demonstration/written material

## 2023-08-22 ENCOUNTER — APPOINTMENT (OUTPATIENT)
Dept: DERMATOLOGY | Facility: CLINIC | Age: 38
End: 2023-08-22

## 2023-11-28 ENCOUNTER — APPOINTMENT (OUTPATIENT)
Dept: OBGYN | Facility: CLINIC | Age: 38
End: 2023-11-28
Payer: COMMERCIAL

## 2023-11-28 VITALS
BODY MASS INDEX: 29.83 KG/M2 | HEIGHT: 61 IN | WEIGHT: 158 LBS | DIASTOLIC BLOOD PRESSURE: 82 MMHG | SYSTOLIC BLOOD PRESSURE: 124 MMHG

## 2023-11-28 DIAGNOSIS — D25.9 MATERNAL CARE FOR BENIGN TUMOR OF CORPUS UTERI, UNSPECIFIED TRIMESTER: ICD-10-CM

## 2023-11-28 DIAGNOSIS — O47.00 FALSE LABOR BEFORE 37 COMPLETED WEEKS OF GESTATION, UNSPECIFIED TRIMESTER: ICD-10-CM

## 2023-11-28 DIAGNOSIS — Z12.4 ENCOUNTER FOR SCREENING FOR MALIGNANT NEOPLASM OF CERVIX: ICD-10-CM

## 2023-11-28 DIAGNOSIS — O35.9XX0 MATERNAL CARE FOR (SUSPECTED) FETAL ABNORMALITY AND DAMAGE, UNSPECIFIED, NOT APPLICABLE OR UNSPECIFIED: ICD-10-CM

## 2023-11-28 DIAGNOSIS — O34.219 MATERNAL CARE FOR UNSPECIFIED TYPE SCAR FROM PREVIOUS CESAREAN DELIVERY: ICD-10-CM

## 2023-11-28 DIAGNOSIS — Z11.3 ENCOUNTER FOR SCREENING FOR INFECTIONS WITH A PREDOMINANTLY SEXUAL MODE OF TRANSMISSION: ICD-10-CM

## 2023-11-28 DIAGNOSIS — O34.10 MATERNAL CARE FOR BENIGN TUMOR OF CORPUS UTERI, UNSPECIFIED TRIMESTER: ICD-10-CM

## 2023-11-28 DIAGNOSIS — Z01.419 ENCOUNTER FOR GYNECOLOGICAL EXAMINATION (GENERAL) (ROUTINE) W/OUT ABNORMAL FINDINGS: ICD-10-CM

## 2023-11-28 PROCEDURE — 99395 PREV VISIT EST AGE 18-39: CPT

## 2023-11-28 PROCEDURE — 36415 COLL VENOUS BLD VENIPUNCTURE: CPT

## 2023-11-29 NOTE — OB PROVIDER TRIAGE NOTE - NS_CTXBYPALP_OBGYN_ALL_OB
Continued Stay SW/CM Assessment/Plan of Care Note       Active Substitute Decision Maker (SDM)     ARNALDO RG [facility] Care Agent 1 - Daughter   Primary Phone: [phone] (Mobile)  Home Phone: [phone]  Work Phone: [phone]                   Progress note:  Peer coverage.    SW consult for discharge planning to begin day of surgery received this date.  SW opened case yesterday for discharge planning - return to [facility] when medically appropriate.    Will continue to follow.    See SW/CM flowsheets for other objective data.    Disposition Recommendations:  Preliminary discharge destination: Planned Discharge Destination: Rehabilitation/Skilled Care  SW/CM recommendation for discharge: Sub-acute nursing home    Discharge Plan/Needs:     Continued Care and Services - Admitted Since 11/27/2023    Coordination has not been started for this encounter.     Selected Continued Care - Episodes Includes continued care and service providers with selected services from the active episodes listed below    Care Transitions Episode start date: 11/8/2023 (Paused)   There are no active outsourced providers for this episode.               Prior To Hospitalization:    Living Situation: Alone and residing at [facility]    .  Support Systems: Children, Family members   Home Devices/Equipment:              Mobility Assist Devices: Ceiling lift, Slide board/sheet   Type of Service Prior to Hospitalization: Other (comment) ([facility] for [facility])               Patient/Family discharge goal (s):  Sub-acute nursing home     Resources provided:           Therapy Recommendations for Discharge:   PT:      Last Filed Values     None        OT:       Last Filed Values     None        SLP:    Last Filed Values     None                   None felt

## 2023-11-30 RX ORDER — DROSPIRENONE AND ETHINYL ESTRADIOL 0.02-3(28)
3-0.02 KIT ORAL
Qty: 3 | Refills: 3 | Status: COMPLETED | COMMUNITY
Start: 2022-11-16 | End: 2023-11-30

## 2023-12-04 LAB
C TRACH RRNA SPEC QL NAA+PROBE: NOT DETECTED
CYTOLOGY CVX/VAG DOC THIN PREP: NORMAL
HAV IGM SER QL: NONREACTIVE
HBV CORE IGM SER QL: NONREACTIVE
HBV SURFACE AG SER QL: NONREACTIVE
HCV AB SER QL: NONREACTIVE
HCV S/CO RATIO: 0.08 S/CO
HIV1+2 AB SPEC QL IA.RAPID: NONREACTIVE
HPV HIGH+LOW RISK DNA PNL CVX: NOT DETECTED
N GONORRHOEA RRNA SPEC QL NAA+PROBE: NOT DETECTED
SOURCE TP AMPLIFICATION: NORMAL
T PALLIDUM AB SER QL IA: NEGATIVE

## 2023-12-20 NOTE — OB RN PATIENT PROFILE - HEIGHT IN CM
Subjective:       Patient ID: Todd Mercado is a 62 y.o. male.    Chief Complaint: Facial Swelling (Facial swelling started yesterday. ), Nasal Congestion (Stuffy and runny nose. Sneezing. For 2 wks. No fever. ), and Cough (Productive and non-productive cough for 2 wks. )    Cough  Associated symptoms include postnasal drip and rhinorrhea. Pertinent negatives include no chest pain, chills, ear pain, eye redness, fever, headaches, myalgias, rash, sore throat, shortness of breath or wheezing. There is no history of environmental allergies.     Review of Systems   Constitutional:  Negative for activity change, appetite change, chills, diaphoresis, fatigue, fever and unexpected weight change.   HENT:  Positive for nasal congestion, postnasal drip, rhinorrhea and sinus pressure/congestion. Negative for dental problem, drooling, ear discharge, ear pain, facial swelling, hearing loss, mouth sores, nosebleeds, sneezing, sore throat, tinnitus, trouble swallowing, voice change and goiter.    Eyes:  Negative for photophobia, pain, discharge, redness, itching and visual disturbance.   Respiratory:  Positive for cough. Negative for apnea, choking, chest tightness, shortness of breath, wheezing and stridor.    Cardiovascular:  Negative for chest pain, palpitations, leg swelling and claudication.   Gastrointestinal:  Negative for abdominal distention, abdominal pain, anal bleeding, blood in stool, change in bowel habit, constipation, diarrhea, nausea, vomiting, reflux and fecal incontinence.   Endocrine: Negative for cold intolerance, heat intolerance, polydipsia, polyphagia and polyuria.   Genitourinary:  Negative for bladder incontinence, decreased urine volume, difficulty urinating, discharge, dysuria, enuresis, erectile dysfunction, flank pain, frequency, genital sores, hematuria, penile pain, testicular pain and urgency.   Musculoskeletal:  Negative for arthralgias, back pain, gait problem, joint swelling, leg pain, myalgias,  neck pain, neck stiffness and joint deformity.   Integumentary:  Negative for pallor, rash, wound and mole/lesion.   Allergic/Immunologic: Negative for environmental allergies, food allergies and frequent infections.   Neurological:  Negative for dizziness, vertigo, tremors, seizures, syncope, facial asymmetry, speech difficulty, weakness, light-headedness, numbness, headaches, memory loss and coordination difficulties.   Hematological:  Negative for adenopathy. Does not bruise/bleed easily.   Psychiatric/Behavioral:  Negative for agitation, behavioral problems, confusion, decreased concentration, dysphoric mood, hallucinations, self-injury, sleep disturbance and suicidal ideas. The patient is not nervous/anxious and is not hyperactive.          Objective:      Physical Exam  Vitals reviewed.   Constitutional:       Appearance: Normal appearance. He is normal weight.   HENT:      Head: Normocephalic and atraumatic.      Right Ear: Tympanic membrane and ear canal normal.      Left Ear: Tympanic membrane, ear canal and external ear normal.      Nose: Congestion and rhinorrhea present.      Mouth/Throat:      Mouth: Mucous membranes are moist.      Pharynx: Oropharynx is clear. Posterior oropharyngeal erythema present.   Eyes:      Extraocular Movements: Extraocular movements intact.      Conjunctiva/sclera: Conjunctivae normal.      Pupils: Pupils are equal, round, and reactive to light.   Cardiovascular:      Rate and Rhythm: Normal rate and regular rhythm.      Pulses: Normal pulses.      Heart sounds: Normal heart sounds.   Pulmonary:      Effort: Pulmonary effort is normal.      Breath sounds: Normal breath sounds.   Abdominal:      General: Abdomen is flat. Bowel sounds are normal.      Palpations: Abdomen is soft.   Musculoskeletal:         General: Normal range of motion.      Cervical back: Normal range of motion and neck supple.   Skin:     General: Skin is warm and dry.   Neurological:      General: No focal  deficit present.      Mental Status: He is alert and oriented to person, place, and time. Mental status is at baseline.   Psychiatric:         Mood and Affect: Mood normal.         Behavior: Behavior normal.         Thought Content: Thought content normal.         Judgment: Judgment normal.         Assessment:       1. Sinusitis, unspecified chronicity, unspecified location    2. Dental abscess        Plan:     Sinusitis, unspecified chronicity, unspecified location  -     cefTRIAXone injection 500 mg  -     dexAMETHasone injection 6 mg    Dental abscess    Other orders  -     amoxicillin-clavulanate 875-125mg (AUGMENTIN) 875-125 mg per tablet; Take 1 tablet by mouth 2 (two) times a day. for 7 days  Dispense: 14 tablet; Refill: 0  -     predniSONE (DELTASONE) 20 MG tablet; Take 2 tablets (40 mg total) by mouth once daily. for 5 days  Dispense: 10 tablet; Refill: 0  -     benzonatate (TESSALON) 100 MG capsule; Take 1 capsule (100 mg total) by mouth 3 (three) times daily as needed for Cough.  Dispense: 20 capsule; Refill: 0            154.94

## 2024-01-04 NOTE — OB PROVIDER H&P - NSTRANFUSIONOBJECTION_GEN_ALL_CORE_SIUH
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 CT scan showed adenopathy which led to BMB showing LPL. PET in 10/27/20 was suspicious for retroperitoneal fibrosis.  Subsequently 12/31/20 renal biopsy showed amyloid AL type.    Second opinion at  [facility].  Workup then also documented Amyloidosis in bone marrow.    BRx2 given  Velcade Rituximab Dex given, reaction to rituximab x2 consisting of dyspnea and hypoxia   pleural effusions on CXR at the same time.  Right pleurex placed in 2021    CyBorD started on 1/21/2021.    C1 1/21/21  C2 2/15/21  C3 3/15/21  C4 4/13/21  C5 5/11/21  Chemo stopped after this.      CHEMO REGIMEN:  none  Oral Chemotherapy:  Is patient on oral chemotherapy?  NO      INTERVAL HISTORY:   1/11/22: reviewed his labs from [place].   He does nto show any relapse of WM  He is concerned about hsi creatinine and is following with the nephrologist.  Requested referrals for cardio for BNP  For pulmonary for pleural effusion and dietary for protein intake adjustment.    08/18/2023:  Patient complains of dyspnea on exertion, leg edema, he is on dialysis.  Plans for changing to peritoneal dialysis.  Just feels unwell..  Slowly progressing.    09/15/2023:  Patient is having significant visual difficulties, shortness of breath, peripheral edema, joint pain, insomnia, neuropathy, hurting all over.    09/27/2023:  Patient continues to have shortness of breath, dyspnea on exertion, tiredness, fatigue, edema.  He started peritoneal dialysis 2 weeks ago and had CT scan just prior to it on 09/13/2023.  He has been on hemodialysis since July 2023.  Patient in the past has had 2 cycles of BR in 5 cycles of CyBorD.  He has peripheral neuropathy at baseline.  Encouraged to start zanubrutinib    10/3/23: Presents alone for follow up today. He  started taking Zanubrutinib 80mg daily on 9/28/23. He reports tolerating this well so far. Denies fever/chills, nausea, vomiting, diarrhea, constipation, new pain, or rashes.  Continues to have SOB, fatigue, BLE edema, and neuropathy at baseline. FU with cardio oncology scheduled tomorrow.     10/12/23: Here for follow up with daughter and son.  Has continued zanubrutinib 80mg daily without much difficulty.  No fevers/chills, nausea, vomiting.  He does report a little diarrhea since starting zithromax per PCP for hoarse voice.  He has been referred to ENT for hoarse voice as well.  Has followed with cardio-oncology with stable ECHO. He continues PD and reports receiving EPO and iron weekly with this.  He noticed some LLE redness 3-4 days ago. Denies injury or bug bite to the area.     11/01/2023.  Patient was instructed originally to start taking zanubrutinib 160 mg once a day.  Patient only taking 80 mg daily.  He quit taking it 2 weeks ago.  I told him that he has at least 1 cardiac risk factor, his troponin I level has been normal at times.    11/28/2023:  Patient has not been taking zanubrutinib for 6 weeks.  Noncompliance.  He is seen ENT, Endocrinology and Cardiology.    01/04/2024:  Patient is still not taking zanubrutinib.  He had skin biopsy done.  Had heart catheterization done in seen by Cardiology.  Continues to have shortness of breath and now short of breath on minimal exertion.  Last seen by pulmonary 2 months ago.    REVIEW OF SYSTEMS  GENERAL:  Patient denies headache, fevers, chills, night sweats, excessive fatigue, change in appetite, weight loss, dizziness  ALLERGIC/IMMUNOLOGIC: Verified allergies: Yes  EYES:  Patient denies significant visual difficulties, double vision, blurred vision  ENT/MOUTH: Patient denies problems with hearing, sore throat, sinus drainage, mouth sores  ENDOCRINE:  Patient denies diabetes, thyroid disease, hormone replacement, hot flashes  HEMATOLOGIC/LYMPHATIC: Patient  denies easy bruising, bleeding, tender lymph nodes, swollen lymph nodes  BREASTS: Patient denies abnormal masses of breast, nipple discharge, pain  RESPIRATORY:  Patient denies lung pain with breathing, cough, coughing up blood, shortness of breath  CARDIOVASCULAR:  Patient denies anginal chest pain, palpitations, shortness of breath when lying flat, peripheral edema  GASTROINTESTINAL: Patient denies abdominal pain , nausea, vomiting, diarrhea, GI bleeding, constipation, change in bowel habits, heartburn, sensation of feeling full, difficulty swallowing  : Patient denies blood in the urine, burning with urination, frequency, urgency, hesitancy, incontinence  MUSCULOSKELETAL:  Patient denies joint pain, bone pain, joint swelling, redness, decreased range of motion  SKIN:  Patient denies chronic rashes, inflammation, ulcerations, skin changes, itching, pt complains of having a dark spot on the left calf. pt complains of having pain in that area   NEUROLOGIC:  Patient denies loss of balance, areas of focal weakness, abnormal gait, sensory problems, numbness, tingling  PSYCHIATRIC: Patient denies insomnia, depression, anxiety       Past Medical History:   Diagnosis Date    Acute detachment of retina 12/03/2021    Anemia     Cardiac amyloidosis (CMD)     ESRD (end stage renal disease) (CMD)     Essential (primary) hypertension     General weakness     Hoarse voice quality     Localized swelling of left lower extremity     Malignant lymphoplasmacytic lymphoma (CMD)     last chemo 7/21    Malignant lymphoplasmacytic lymphoma (CMD)     Malignant neoplasm (CMD) 2015    Don (surgical resection & radiation)    Merkel cell cancer (CMD)     Merkel cell cancer (CMD)     Metastatic malignant neoplasm, unspecified site (CMD)     Patient on peritoneal dialysis (CMD) 09/04/2023    Peritoneal dialysis catheter in place (CMD)     abd    Pleural effusion     Recurrent pleural effusion     [facility] 1/16-1/22/21    Renal  amyloidosis (CMD)     Renal amyloidosis (CMD)     Right retinal detachment     S/p robotic left thoracoscopy (VATS) drainage of pleural effusion total decortication and biopsy of pericardium 03/15/2023    SOB (shortness of breath)     Surgical wound, non healing 10/20/2015    Thoracic aorta atherosclerosis (CMD)     Traveling blood clot in leg (CMD)     Vocal cord paralysis     Waldenstrom macroglobulinemia (CMD)     Wears glasses     Xerosis cutis      Past Surgical History:   Procedure Laterality Date    Biopsy of skin lesion  03/31/2015    BUTTOCK MASS  MERKELS    Bronchoscopy  03/22/2023    Dr. Ash Palomo    Cataract extraction w/ intraocular lens implant  04/18/2022    Abad Chávez    Colonoscopy      Ct guided needle placement  06/30/2020    Retroperitoneal lymph node biopsy    Decortication,pulmonary,total  03/15/2023    S/p robotic left thoracoscopy (VATS) drainage of pleural effusion total decortication and pericardial window    Echocardiogram      Egd  07/16/2020    Blanca Cespedes    Eye surgery Right 12/02/2021    retina surgery by [facility]    Peritoneal catheter insertion  07/25/2023    Refractive surgery      RK left eye and Lasik bilateral  1990's    Right heart cath  12/18/2023    Skin lesion excision  04/10/2015    WIDE EXCISION OF BUTTOCK MASS    Thoracentesis Left 11/01/2022    right thoracentesis x3 and left thoracentesis x2 prior to today per pt.    Vasectomy       ALLERGIES:  Rituximab, Amlodipine, and Losartan    HOME MEDICATIONS:  Current Outpatient Medications   Medication Sig    HYDROcodone-acetaminophen (NORCO) 5-325 MG per tablet Take 2 tablets by mouth every 6 hours as needed for Pain.    triamcinolone (ARISTOCORT) 0.1 % ointment Apply twice daily to trunk x 2 weeks and then stop. May reuse for future flares    levothyroxine 50 MCG tablet Take 1 tablet by mouth daily.    apixaBAN (Eliquis) 5 MG Tab Take 1 tablet by mouth every 12 hours.    gentamicin (GARAMYCIN) 0.1 %  cream Apply topically every other day.    loperamide (Anti-Diarrheal) 2 MG tablet Take 4 mg (2 tablets) by mouth at the first sign of diarrhea, followed by 2 mg (1 tablet) after each loose stool.    Cholecalciferol (Vitamin D3) 50 mcg (2,000 units) tablet Take 50 mcg by mouth daily.    sevelamer carbonate (RENVELA) 800 MG tablet TAKE 1 TABLET BY MOUTH IN THE MORNING AND AT NOON AND IN THE EVENING WITH MEALS     No current facility-administered medications for this visit.     CODE STATUS: full code.    Social History     Socioeconomic History    Marital status:      Spouse name: Not on file    Number of children: 4    Years of education: 16    Highest education level: Not on file   Occupational History    Occupation: Retired     Comment: computers   Tobacco Use    Smoking status: Never     Passive exposure: Never    Smokeless tobacco: Never   Vaping Use    Vaping Use: never used   Substance and Sexual Activity    Alcohol use: Not Currently     Alcohol/week: 2.0 standard drinks of alcohol     Types: 1 Glasses of wine, 1 Cans of beer per week     Comment: occasional    Drug use: No    Sexual activity: Not on file   Other Topics Concern    Not on file   Social History Narrative    Not on file     Social Determinants of Health     Financial Resource Strain: Low Risk  (11/12/2023)    Financial Resource Strain     Unable to Get: None   Food Insecurity: Not At Risk (11/12/2023)    Food Insecurity     Food Insecurity: Worried or Stressed about Money for Food: Never   Transportation Needs: Not At Risk (11/12/2023)    PRAPARE - Transportation     Lack of Transportation (Medical): No     Lack of Transportation (Non-Medical): No   Physical Activity: Insufficiently Active (7/18/2022)    Exercise Vital Sign     Days of Exercise per Week: 5 days     Minutes of Exercise per Session: 20 min   Stress: Low Risk  (7/18/2022)    Stress     How Stressed: Not at all   Social Connections: Low Risk  (11/12/2023)    Social Connections      Social Connectivity: 3 to 5 times a week   Interpersonal Safety: Not At Risk (12/14/2023)    Interpersonal Safety     Social Determinants: Intimate Partner Violence Past Fear: No     Social Determinants: Intimate Partner Violence Current Fear: No     h/o Etoh consumption 2-3 glasses of gin and hard liquor up til 6 months He states he had barely 2-4 glasses of wine in last 6 months    Family History   Problem Relation Age of Onset    Dementia/Alzheimers Mother     Stroke Father     Myocardial Infarction Sister     Cancer Sister     Heart disease Sister      Vital Signs:   Vitals:    01/04/24 1345   BP: (!) 187/76   BP Location: LUE - Left upper extremity   Patient Position: Sitting   Cuff Size: Regular   Pulse: 61   Resp: 14   Temp: 97.3 °F (36.3 °C)   TempSrc: Temporal   SpO2: 98%   Weight: 71.9 kg (158 lb 6.4 oz)       Physical Exam:   GENERAL: Alert, is in no apparent distress and is well developed and well nourished.  LYMPH NODES: No cervical adenopathy, no supraclavicular adenopathy  SKIN:  Warm dry and intact. No rashes or ecchymosis.   HEENT:  no scleral icterus or pallor , dry mucous membranes, no mucositis, Neck supple   LUNGS:  Clear per auscultation bilaterally.  HEART: RRR, no murmurs.  ABDOMEN: Abdomen is soft, normal active bowel sounds, nontender to palpation  NEUROLOGIC: No focal deficits, AAO x 3, moving all 4 extremities, no facial asymmetry.  EXTREMITIES: +3 lower pitting edema to BLE, LLE slightly worse than RLE.  Localized erythema with tactile increase to LLE.     RESULTS    Echo 12/22/2020  Limited echocardiogram performed.  Small pericardial effusion. Echocardiographic findings suggest a non hemodynamically significant pericardial effusion.  Normal left ventricular systolic function. Left ventricular ejection fraction, 67 %. GLS 13%.  Normal IVC dimension with >50% respiratory change of the inferior vena cava.  Pleural effusion.  No significant change since the prior study.    ECHO  4/20/2023: EF 58%               9/22/2023: EF 56%     PET 5/19/23   1. Stable to mildly decreased FDG avid pericardiophrenic/internal mammary nodes.  2. Decrease avidity associated with infiltrative retroperitoneal soft tissue with resolution of more focal avidity on the left.   3. No progressive FDG avid adenopathy. No focal avid osseous lesions. No suspicious sclerotic lesions.  4. Small to moderate left pleural effusion. Mild left pleural avidity could be related to pleuritis.  5. Posttreatment changes left gluteal region without local recurrence.  6. Probably physiologic/inflammatory gastric/duodenal activity.    9/18/2020\"  A.   Peripheral blood:  -Normocytic anemia.  -Monoclonal serum protein (IgM lambda, 1.5 g/dL).     B.   Bone marrow, biopsy, touch imprint, aspirate, and clot section:  -Involved by lymphoplasmacytic lymphoma (60-70% involvement).  -Normocellular marrow with trilineage hematopoiesis.  -Decreased iron stores.  -Molecular studies are positive for MYD88 mutation.  -Normal male karyotype: 46, XY.     6/30/2020:  Lymph node, left retroperitoneal, core biopsy and cytologic preparation:   -Atypical B-cell-rich lymphoid infiltrate; nondiagnostic (see microscopic description).    Cytology 7/28/20  Neg for malignancy    12/31/2020:  Renal amyloidosis involving the glomeruli, interstitium and extraglomerular blood vessels, see note  -Mild chronic tubulointerstitial changes     Note: The clinical history of lymphoplasmacytic lymphoma with bone marrow amyloidosis and monoclonal IgM lambda serum protein is noted. Four of sixty (approximately 7%) glomeruli are globally sclerosed. The amyloid deposits show lambda light chain restriction compatible with AL lambda amyloidosis but also positively stain for amyloid protein A; tissue will be sent to [facility] for amyloid subtyping and identification and the results will be forwarded when available. Evidence of segmental or nodular glomerular  sclerosis or immune-mediated glomerulonephritis is absent    Bone marrow biopsy  8/18/2021:  Peripheral blood, bone marrow aspirate and biopsy, iliac crest:     1.  Amyloidosis, previously subtyped as AL-lambda.     2.  A small lambda light chain restricted B-cell population is detected by flow cytometry only, less than 1% of total events.     3. Slight hypocellular bone marrow with maturing trilineage hematopoiesis.    M spike IgM Lambda PET/ct BM biopsy upep   8/25/20 1.5 2040 23.4 NO lytic lesion 60-70% LPL  4.2 g   9/28/21 0.2 77    2.8g            4/7/22      5.57g   5/12/22 0.3+0.2 253 5      9/12/22 0.5+0.1 332 8      1/6/23 0.5+0.1 390 14      3/28/23 0.4+0.1 285       4/25/23 0.5+0.1 350       9/27/23   present 410 23.48        CLINICAL IMPRESSION & PLAN:    Cardiac amyloidosis: Waldenstroms diagnosed in 9/2020 with 70% marrow involvement and Igm spike of 1.5.  Also with Amyloid, biopsy proven in kidney and cardiac MRI showing involvement.  Initial troponins were elevated at 0.13.  Initial NTproBNP [id number].  Neuropathy present at diagnosis.   VEGF level 96 on 5/12/22.  Has adenopathy, small nodes, last PET on 5/2022. Troponins and NT probnp support stage 2 cardiac amyloid  from 1/2022 numbers. NTproBNP 401 on 5/12/22, troponin 0.03.    Considered POEMS as a diagnostic consideration.  Meets neuropathy, endocrinopathy (b12), mspike, and for skin has multiple telangiectasias on his chest.  No sclerotic lesions on PET 5/2022.  So doesn't meet criteria.    Overall presentation is concerning for LPL inducing amyloid but with possible POEMS. Cant make the POEMS diagnosis, VEGF is not 4xs normal.  5/16/22 Bone marrow without lymphoma or myeloma.-46,X,-Y,+15[2]/46,XY[19], recurrent pleural effusions that are exudative.    9/22/22  Suggested jennifer, he is adverse to any treatment and refuses all treatments, definitely will not try CyBORD.    1/17/23  He feels his breathing is worse.  Neuropathy is worse and renal  function is crt going from 1.6 to 2.0.  Discussed treatment options including ibrutinib or acalibrutinib. He is not interested in standard chemotherapy such as bendamustine or restarting CyBORD. Discussed ANDI, he was not wanting this either.      Saw [facility] and would like to start Zanubrutinib.  He has refused treatment for over a year.  He is agreeable now.  Discussed side effects and risk benefit as well as the potential that this drug could be used long term.  creatinine is 2.8, elevated from amyloid    3/30/23  He was just discharged from the hospital with complications of amyloid.  He is sob with activity.  No significant orthopnea.  He is weak and comes in with his daughter in a wheel chair.  Survival with amyloid is very limited with his severe cardiac dysfunction, usually months.  His mspike is 0.5 range and he will likely continue to accrue additional amyloid unless this is brought under control.  Long discussion today with goals of care. He will follow up in 4 weeks.    4/27/23  He is having some GERONIMO .  No significant swelling per se.  Long discussion with patient and daughter. They met with hospice, not interested yet.  He would like to be set up with palliative care. We will make that referral.  No treatment for his waldenstroms.  Follow up with dr singleton.  NO TREATMENT. Discussed extensively, they understand the amyloid will progress.  It has been a slowly progressive amyloid, hard to estimate survival.  Suspect less than 12 months.  Has vascular ectasia on torso diffusely.    08/18/2023:  We will order labs with plasma cell profile today  CT of chest, neck, abdomen, pelvis with IV contrast if okay with Nephrology  Check troponin I and BNP  Repeat echocardiogram  Serum viscosity today   Return in 4 weeks with labs:  CBC, CMP, LDH, troponin I, sodium viscosity, plasma cell profile, BNP  It appears that patient's IgM in free light chains have been slowly getting worse.  He was recommended treatment in the  past with daratumumab as well as zanbrutinib and has refused in the past.  He is more open to trying these targeted chemotherapy agents now.    09/15/2023:  Patient has Waldenstrom's macroglobulinemia with amyloidosis involving heart and kidneys.  Patient on peritoneal dialysis and having significant congestive heart failure symptoms.  Troponin I is 252 significantly elevated, [place] 510[address].  Cardiac MRI to be repeated  Bone marrow biopsy on Monday  We will review CT scans and if indicated we will consider PET-CT to rule out transformation  I spoke to him about treatment for amyloid and he is agreeable.  Depending on the bone marrow biopsy results and percentage of plasma cells versus lymphoma cells, we will decide on the type treatment.    09/27/2023:  Waldenstrom macroglobulinemia with amyloidosis of heart and kidneys.  End-stage renal disease, now on peritoneal dialysis  Amyloid cardiomyopathy  Reviewed CT scans of chest, abdomen, pelvis with Radiology and no significant worsening of lymphadenopathy seen.  Likely infiltrative changes from amyloid appear persistent  I had a discussion with patient and encouraged him to start zanubrutinib.  The patient had used it for 2 weeks previously and did not tolerate it.  I will started again 160 mg once a day dosing.  He has remaining supply.  Follow up with nurse practitioner  in 2  Weeks  Follow up with MD in 4 weeks  Cardio oncology follow-up in 1 week  Monthly plasma cell profile and labs    10/3/23: Taking Zanubrutinib 80mg daily since 9/28/23. Has taken 5 doses and is tolerating well so far. CBC with stable anemia, mild thrombocytopenia with platelet level 136.  WBC WNL. Continue current dosing.     10/12/23: continues zanubrutinib 80mg daily. Tolerates well, counts stable.  Has developed LLE redness and warmth over the last 3-4 days.  Suspect cellulitis, keflex sent.  However, will rule out DVT with venus US of LLE as well.     11/01/2023:  Patient wanted to know what  his longevity we will be expected with his disease.  Presence of 1 risk factor (elevated NT pro BNP) makes him stage II disease on several AL amyloidosis staging systems with a median overall survival expected to be 3.9 years on [facility] staging system.  Cardiac troponin I has been normal on occasions and has been intermittently elevated.  I explained to him that taking zanubrutinib at the recommended dose is likely to decrease amyloid production and improve his cardiac status.  I advised him to start taking it at the 160 mg daily dose at least.  He has preserved ejection fraction and contractility however his elevated BNP is most likely related to impaired relaxation.  Patient has end-stage renal disease and is on peritoneal dialysis, and that can contribute to his elevated BNP Discussed  cardiac MRI results: No significant difference from March 2022.    11/28/2023:  Cardiac and renal AL amyloidosis.  LPL.  Patient took zanubrutinib for about 4 weeks in had some improvement of his IgM, M protein and lambda light chain.  He was only taking 80 mg daily.  He quit taking about 6 weeks ago.  He has a procedure scheduled on December 8, 2023 for his vocal cord, for hoarseness.  Right heart catheterization is scheduled for December 14, 2023.  He has a peroneal vein clot in left leg on ultrasound done in November 2023.  No DVT.  Continues to be on Eliquis.  Also has a maculopapular rash sparsely on the chest and back.  Recommended Dermatology appointment in biopsy.  There is erythema around left leg ulcer that is healed.  We will give doxycycline 100 mg b.i.d. for 2 weeks  Recommended to start zanubrutinib after his cardiac catheterization  Follow up in 4 weeks with repeat labs and plasma cell profile  Again emphasized the need to take BTK inhibitor for his amyloidosis related to LPL stopped    01/04/2024:  Biopsy-proven renal and pleural al amyloidosis.  LPL.  Suggestion of amyloidosis on cardiac MRI.  Patient complains  of worsening shortness of breath.  Left upper lobe BAL was negative on 03/22/2023.  Pleural peel excision showed marked pleural fibrosis and fibrinous exudate.  Pericardium excision showed marked fibrosis, fibrinous exudate and amyloid deposition.  Amyloid was detected to be AL type.  This was done on 03/15/2023.  Patient has not taken zanubrutinib as he says that it makes him nauseated and feel sick.  He has not tried Zofran with it.  We will prescribe Zofran.  If he is still not able to tolerate it then we will try acalabrutinib 100 mg b.i.d..  Check pulse oximetry after ambulation to see if he qualifies for home oxygen.  Otherwise we will do nocturnal pulse oximetry.  CT chest to follow up on pleural fibrosis      Immunosuppression: with chemo as above.    Cardiovascular;  patient has cardiac amyloidosis:   - Pleural effusion, tap in [facility] showed exudative effusion that was flow negative for lymphoma.    6/2/22 still with sob.  Having pleural effusions.  Will send for stress test.    10/3/23: SOB at baseline, BNP 20,585 today with normal troponin. Cardio-oncology follow up scheduled tomorrow.      10/12/23: has followed with cardio-oncology. Stable ECHO. He has stopped torsemide and hydralazine and is monitoring BPs at home. Advised increased protein intake, elevating legs, and wearing compression stockings. LE edema is related to hypoalbuminemia.  He is not wearing compression stockings today.  NT pro BNPs will be non-diagnostic to assess his volume status due to dialysis. Following with nephrology. Cardiac MRI scheduled 10/25/23.     Pulmonary:   - Now with recurrent pleural effusion.  - S/P pleurex catheter 01/20. Scant drainage aprox 50 cc's weekly.   - continue spironolactone,  torsemide dose decreased to 20 mg per day. Patient still having dizziness at times. Will discontinue and observe.  Will plan on having patient come to clinic weekly for lab work.   2/15/2021: Will get a CXR and attempt to remove  pleurex.  2/22/21, Pleurx catheter removed. CXR showed partial clearing of bilateral pleural effusion. Stopped torsemide and spironolactone on his own.   1/11: no further dyspnea but wants to follow with pulmonary referral placed    Neuro NA    Fluid/electrolytes/nutrition/volume status  - Protein calorie malnutrition: [] None  [] Mild (serum albumin 3.1-3.4)  [] Moderate (serum albumin 2.4-3.0)  [x] Severe (serum albumin <2.4).  Please see note from dietary for full assessment of degree of severity .  Nephro: H/o renal amyloidosis secondary to LPL. Needs treatment for underlying malignancy.24 hr urine: 99766 mg/day at [facility] ( from nephro note on 12/30)   - Compression stockings.   2/15/21: reduced torsemide to 10 mg daily continue spironolactone will reassess midodrine, and his swelling next week.  2/22/21: Stopped tosemide, spironolactone, midodrine on his own stating that he \"doesn't need them\". No increase in edema, monitor closely.    3/1/21:  Increase in lower extremity edema.Continue spironolactone.  He restarted it on his own on 2/25. He was also instructed to restart torsemide 20 mg daily and monitor.   4/28/21, taking 10 mg of torsemide daily.  Stopped spironolactone.  -10/3/23: Currently taking torsemide 20mg daily per nephrology. On PD nightly.  BLE edema and SOB at baseline. FU with cardio-oncology tomorrow.   -10/12/23: receiving venofer and EPO per nephrology      ID:  He has bilateral lower extremity edema with an albumin of 2.1.  He has some left lower extremity erythema and we will prescribe doxycycline 100 mg b.i.d. for 2 weeks    8. Hematology he does have labs 0 was- Pancytopenia secondary to chemotherapy and disease?  [x] Yes  [] No  - Transfuse 1 RCM for Hgb <7.0.  Transfuse 1 unit of platelets for platelet count <10.  All blood products should be irradiated.  No need for transfusion today. Blood counts are stable/improved.   7/12/21, Chronic anemia, B12 and folate levels checked and were  normal, iron level slightly low at 55, ferritin is elevated at 451.  He has anemia of chronic disease.  Monitor.  -10/3/23: Hemoglobin 7.7. No s/s of bleeding. No nutrient deficiency.  Discussed potential need for transfusions in the future.     GI:   -10/12/23: slight diarrhea, possibly 2/2 azithromycin prescribed by PCP for hoarse voice.     9. Endocrine.    -10/3/23: following with endocrinology for low testerone and subclinical hypothyroidism.  No testosterone or thyroid replacement indicated at this time. Follow up scheduled 11/14/23.     10. 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  He was on  80mg daily and stopped 6 weeks ago.  Takes Zofran 8 mg p.o. daily before zanubrutinib.  Started at the 8 mg dose.  -increase Synthroid to 75 mcg daily as TSH is still elevated.  - FU with nephrology, pulmonology, endocrinology, and neurology as scheduled.   -cardiology performed right heart catheterization  -dermatology has seen him for skin rash and performed biopsy  -Follow up with NP in 4 weeks as scheduled. CBC CMP PCP prior.  -MD follow up in 8 weeks    Charan Bravo MD     Patient has no objection to blood transfusions.

## 2024-02-06 ENCOUNTER — APPOINTMENT (OUTPATIENT)
Dept: DERMATOLOGY | Facility: CLINIC | Age: 39
End: 2024-02-06
Payer: COMMERCIAL

## 2024-02-06 DIAGNOSIS — Q82.5 CONGENITAL NON-NEOPLASTIC NEVUS: ICD-10-CM

## 2024-02-06 DIAGNOSIS — L21.8 OTHER SEBORRHEIC DERMATITIS: ICD-10-CM

## 2024-02-06 DIAGNOSIS — D48.5 NEOPLASM OF UNCERTAIN BEHAVIOR OF SKIN: ICD-10-CM

## 2024-02-06 PROCEDURE — 11102 TANGNTL BX SKIN SINGLE LES: CPT

## 2024-02-06 PROCEDURE — 99203 OFFICE O/P NEW LOW 30 MIN: CPT | Mod: 25

## 2024-02-06 RX ORDER — ERGOCALCIFEROL 1.25 MG/1
1.25 MG CAPSULE, LIQUID FILLED ORAL
Qty: 12 | Refills: 0 | Status: DISCONTINUED | COMMUNITY
Start: 2022-09-21 | End: 2024-02-06

## 2024-02-06 RX ORDER — MOMETASONE FUROATE 1 MG/ML
0.1 SOLUTION TOPICAL
Qty: 1 | Refills: 5 | Status: ACTIVE | COMMUNITY
Start: 2024-02-06 | End: 1900-01-01

## 2024-02-06 RX ORDER — FLUCONAZOLE 150 MG/1
150 TABLET ORAL
Qty: 2 | Refills: 0 | Status: DISCONTINUED | COMMUNITY
Start: 2023-02-11 | End: 2024-02-06

## 2024-02-06 RX ORDER — FLUTICASONE PROPIONATE 0.5 MG/G
0.05 CREAM TOPICAL
Qty: 1 | Refills: 2 | Status: ACTIVE | COMMUNITY
Start: 2024-02-06 | End: 1900-01-01

## 2024-02-06 NOTE — PLAN
[TextEntry] : Start mometasone solution 0.1% to scalp 2-3 times a day as needed Start fluticasone cream 0.05% to face and chest once or twice a day as needed-can also use for itching on the palms  Biopsy lesion on right mid lateral back  Photo taken. Verbal consent obtained.  1% lidocaine with epinephrine Shave biopsy and curettage x 1-nothing curetted Aluminum chloride Vaseline  Wound care instructions given.  Patient to call in 2 weeks if not previously notified of results.  If biopsy is negative, return as needed  Alana, medical assistant, served as chaperone and was present for the entire skin exam.  URIEL Garduno student, served as chaperone and was present for the entire skin exam.

## 2024-02-06 NOTE — PHYSICAL EXAM
[Alert] : alert [Oriented x 3] : ~L oriented x 3 [Well Nourished] : well nourished [FreeTextEntry3] : The following areas were examined and no significant abnormalities were seen except as noted below:  Type II skin  scalp, face, eyelids, nose, lips, ears, neck, chest, abdomen, back, buttocks, right arm, left arm, right hand, left hand, right  leg, left leg, right foot, left foot Breast and groin exams offered and declined by patient.  Anterior hairline: Mild ill-defined pinkness and scaling Mid chest: Focal ill-defined brown and pink slightly scaly patch  Right mid lateral back: 6 x 4 mm black plaque  Right posterior flank: 11.5 x 6.5 mm black smooth plaque (patient states she has had this her entire life)  No other suspicious lesions seen

## 2024-02-06 NOTE — HISTORY OF PRESENT ILLNESS
[FreeTextEntry1] : Evaluation of growths [de-identified] : First visit for 38-year-old  female presents for evaluation of growths.  Particularly concerned about a lesion on the back which is growing.  No history of skin cancer.  Patient also complains of an episodic itchy rash on the anterior hairline, forehead, and eyebrows. No previous treatment

## 2024-02-06 NOTE — ASSESSMENT
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  Evolving seborrheic keratosis, rule out malignant melanoma on right mid lateral back

## 2024-03-12 ENCOUNTER — APPOINTMENT (OUTPATIENT)
Dept: ORTHOPEDIC SURGERY | Facility: CLINIC | Age: 39
End: 2024-03-12
Payer: COMMERCIAL

## 2024-03-12 VITALS
WEIGHT: 152 LBS | HEIGHT: 61 IN | TEMPERATURE: 97.9 F | BODY MASS INDEX: 28.7 KG/M2 | HEART RATE: 71 BPM | SYSTOLIC BLOOD PRESSURE: 125 MMHG | DIASTOLIC BLOOD PRESSURE: 75 MMHG

## 2024-03-12 DIAGNOSIS — M25.811 OTHER SPECIFIED JOINT DISORDERS, RIGHT SHOULDER: ICD-10-CM

## 2024-03-12 DIAGNOSIS — M25.511 PAIN IN RIGHT SHOULDER: ICD-10-CM

## 2024-03-12 PROCEDURE — 73030 X-RAY EXAM OF SHOULDER: CPT | Mod: RT

## 2024-03-12 PROCEDURE — 99203 OFFICE O/P NEW LOW 30 MIN: CPT

## 2024-03-13 NOTE — OB RN PATIENT PROFILE - WEIGHT: TOTAL WEIGHT IN LBS
general medical examination at a health care facility  Assessment & Plan:  Well exam in office   Discussed healthy diet and active lifestyle   Discussed vaccines   Reviewed and updated HM   Second Shingrix given today  Check labs  Orders:  -     Lipid, Fasting; Future  -     Comprehensive Metabolic Panel; Future  -     CBC with Auto Differential; Future  2. Essential hypertension  Assessment & Plan:   Well-controlled, continue current medications  3. Psoriasis  Assessment & Plan:  Stable, controlled  Following with Derm      No follow-ups on file.       Patient engaged in shared decision making. Information given to evaluate options of treatment, understand what is needed and discuss importance of following plan.    14

## 2024-03-14 PROBLEM — M25.511 ACUTE PAIN OF RIGHT SHOULDER: Status: ACTIVE | Noted: 2024-03-12

## 2024-03-14 NOTE — HISTORY OF PRESENT ILLNESS
[de-identified] : This is a 39yo female presenting with complaint of right shoulder pain x 2 months. She reports to specific injuries. She states pain is worsened with certain movements such as reaching to her back to remove bra. She has tried anti-inflammatories for pain relief without improvement.

## 2024-03-14 NOTE — PHYSICAL EXAM
[de-identified] : General Exam: Appearance: well developed and nourished Orientation: Alert and oriented to person, place, time. Mood: mood and affect well-adjusted, pleasant and cooperative, appropriate for clinical and encounter circumstances  Right Shoulder: ROM: grossly intact; +pain noted with internal rotation and forward flexion  Skin: Intact, no rashes or lesions. Inspection: Normal alignment, no deformity, no warmth, no masses, no muscle atrophy, no scapular winging, no clavicle deformity, no crepitus  Forward Flexion Strength: 5/5, normal muscle tone. Shoulder Abduction Strength: 5/5, normal muscle tone. Shoulder External Rotation Strength: 5/5, normal muscle tone. Shoulder Internal Rotation Strength: 5/5, normal muscle tone. Special Tests +Impingement sign  [de-identified] : 3 views of the right shoulder show no acute findings to right shoulder

## 2024-03-14 NOTE — DISCUSSION/SUMMARY
[de-identified] : 39yo female presenting with right shoulder pain x 2 months. We discussed a conservative care plan at length. Patient was given a referral for outpatient PT and advised to use ice and NSAIDs for pain control. She will follow up in 1 month with Dr. Joshi for further evaluation if not improved.  The patient was given the opportunity to ask questions and all questions were answered to their satisfaction.

## 2024-04-22 ENCOUNTER — APPOINTMENT (OUTPATIENT)
Dept: ORTHOPEDIC SURGERY | Facility: CLINIC | Age: 39
End: 2024-04-22

## 2024-06-14 ENCOUNTER — APPOINTMENT (OUTPATIENT)
Dept: INTERNAL MEDICINE | Facility: CLINIC | Age: 39
End: 2024-06-14

## 2024-06-16 NOTE — HISTORY OF PRESENT ILLNESS
Patient reported blood sugars:       [FreeTextEntry1] : CPE [de-identified] : Gloria is a 37 yo F w PMHx gestational diabetes with both pregnancies, insulin dependent during last pregnancy, obesity

## 2024-09-23 NOTE — OB NEONATOLOGY/PEDIATRICIAN DELIVERY SUMMARY - BABY A: VOID IN DELIVERY
The patient's goals for the shift include Pain level of 3 or less    The clinical goals for the shift include Pt will state decrease pain with rating less than 6/10    Problem: Skin  Goal: Decreased wound size/increased tissue granulation at next dressing change  Outcome: Progressing  Goal: Participates in plan/prevention/treatment measures  Outcome: Progressing  Goal: Prevent/manage excess moisture  Outcome: Progressing  Goal: Prevent/minimize sheer/friction injuries  Outcome: Progressing  Goal: Promote/optimize nutrition  Outcome: Progressing  Goal: Promote skin healing  Outcome: Progressing     Problem: Fall/Injury  Goal: Not fall by end of shift  Outcome: Progressing  Goal: Be free from injury by end of the shift  Outcome: Progressing  Goal: Verbalize understanding of personal risk factors for fall in the hospital  Outcome: Progressing  Goal: Verbalize understanding of risk factor reduction measures to prevent injury from fall in the home  Outcome: Progressing  Goal: Use assistive devices by end of the shift  Outcome: Progressing  Goal: Pace activities to prevent fatigue by end of the shift  Outcome: Progressing     Problem: Pain  Goal: Takes deep breaths with improved pain control throughout the shift  Outcome: Progressing  Goal: Turns in bed with improved pain control throughout the shift  Outcome: Progressing  Goal: Walks with improved pain control throughout the shift  Outcome: Progressing  Goal: Performs ADL's with improved pain control throughout shift  Outcome: Progressing  Goal: Participates in PT with improved pain control throughout the shift  Outcome: Progressing  Goal: Free from opioid side effects throughout the shift  Outcome: Progressing  Goal: Free from acute confusion related to pain meds throughout the shift  Outcome: Progressing      no

## 2024-12-02 NOTE — OB PROVIDER TRIAGE NOTE - NS_FETALMOVEMENT_OBGYN_ALL_OB
Writer called pt about his upcoming appointment. Pt said that he hadn't completed his litholink and asked to reschedule. Pt also asked to be sent the clinic triage number in an email so that he could call in case he can't find the litholink kit he has, but confirmed that he had received a kit at some point in the past.     - Inge Scott (EMT)  8:52 am  12/2/2024  
Present, unchanged

## 2025-03-13 ENCOUNTER — NON-APPOINTMENT (OUTPATIENT)
Age: 40
End: 2025-03-13

## 2025-03-14 NOTE — HISTORY OF PRESENT ILLNESS
Outpatient Physical Therapy Neuro Treatment Note  [facility]     Patient Name: Kerrie Tyler  : 1943  MRN: [id number]  Today's Date: 3/14/2025      Visit Date: 2025    Visit Dx:    ICD-10-CM ICD-9-CM   1. Impaired functional mobility, balance, gait, and endurance  Z74.09 V49.89   2. 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 yes  -LB      Pre-Treatment Pain Level 0  -LB      Post-Treatment Pain Level 0  -LB         Cognition    Overall Cognitive Status WFL  -LB         Posture/Observations    Posture/Observations Comments pt ambulated up to unit, no AD.  -LB         Transfers    Sit-Stand [place] (Transfers) independent  -LB      Stand-Sit [place] (Transfers) independent  -LB         Gait/Stairs (Locomotion)    Distance in Feet (Gait) 150  -LB      Pattern (Gait) step-through  -LB      Deviations/Abnormal Patterns (Gait) ace decreased;gait speed decreased;stride length decreased  -LB      Bilateral Gait Deviations decreased arm swing;heel strike decreased  -LB      Left Sided Gait Deviations heel strike decreased  -LB      Gait Assessment/Intervention Ambulated while performing dual  task carrying a tray and maintaining a ball on the tray with attempts to maintain balance  -LB         Balance Skills Training    Standing-Level of Assistance Close supervision;Contact guard;Minimum assistance  -LB      Static Standing Balance Support No upper extremity supported  -LB      Standing-Balance Activities Foam square;Feet together;Reaching across midline;Balloon tapping;Trampoline;Rocker board  marching; weight shifting; semitandem and 360 turn on trampoline  -LB      Standing Balance # of Minutes Utilized large thick foam with reaching with a weighted ball in multiple directions, tendency for posterior lean. Min A to maintain balance when on rocker board and hitting a balloon  -LB      Gait Balance-Level of Assistance Contact guard;Minimum assistance  -LB      Gait Balance Support No upper extremity supported  -LB      Gait Balance Activities foam beam  -LB                User Key  (r) = Recorded By, (t) = Taken By, (c) = Cosigned By      Initials Name Provider Type    Emelia Raphael PT Physical Therapist                             PT Assessment/Plan       Row Name 03/14/25 4199          PT Assessment    Assessment Comments With shorter walks and when distracted the patient does have a harder time maintaining heelstrike and armswing.  With dual taks, walking the patient was able to maintain a slow ace and heelstrike with step length.  When on rocker board, most tendency was posterior lean and slow reaction to correcting the lean and required assist to regain balance.  Pt is able to maintain balance on solid ground well but need to continue to work on increasing amplitude of LEs to maintain clearance and step length.  -LB               User Key  (r) = Recorded By, (t) = Taken By, (c) = Cosigned By      Initials Name Provider Type    Emelia Raphael PT Physical Therapist                        OP Exercises       Row Name 03/14/25 2429 03/14/25 0752          Subjective    Subjective Comments --  Working hard to improve walking  -LB        Subjective Pain    Able to rate subjective pain? -- yes  -LB     Pre-Treatment Pain Level -- 0  -LB     Post-Treatment Pain Level -- 0  -LB        Total Minutes    93102 -  PT Neuromuscular Reeducation Minutes 27  -LB --     00081 - PT Therapeutic Activity Minutes 14  -LB --        Exercise 8    Exercise Name 8 -- STS with immediate step  -LB     Reps 8 -- 5  -LB     Additional Comments -- from chair  -LB        Exercise 10    Exercise Name 10 -- steps up from one foam to foam on tep of steps  -LB     Reps 10 -- 10  -LB     Additional Comments -- UE support  -LB               User Key  (r) = Recorded By, (t) = Taken By, (c) = Cosigned By      Initials Name Provider Type    Emelia Raphael, PT Physical Therapist                                                   Time Calculation:   Start Time: 1416  Stop Time: 1457  Time Calculation (min): 41 min  Timed Charges  52017 -  PT Neuromuscular Reeducation Minutes: 27  32271 - PT Therapeutic Activity Minutes: 14  Total Minutes  Timed Charges Total Minutes: 41   Total Minutes: 41   Therapy Charges for Today       Code Description Service Date Service Provider Modifiers Qty    [id number]  PT NEUROMUSC RE EDUCATION EA 15 MIN 3/14/2025 Emelia Hays, PT GP 2    [id number]  PT THERAPEUTIC ACT EA 15 MIN 3/14/2025 Emelia Hays, PT GP 1                      Emelia Hays PT  3/14/2025      [FreeTextEntry1] : 33 year year old female presenting with L ankle pain. The patient’s pain is noted to be a 7/10. She states that her pain has been occuring since 1/9//19. The patient's pain is described as constant and localized in nature. The patient denies any falls, trauma, or injuries. \par She is currently taking no pain medication. She presents today wearing regular shoes. No other complaints at this time. \par

## 2025-05-17 ENCOUNTER — APPOINTMENT (OUTPATIENT)
Dept: OBGYN | Facility: CLINIC | Age: 40
End: 2025-05-17

## 2025-05-17 VITALS
WEIGHT: 164.25 LBS | DIASTOLIC BLOOD PRESSURE: 60 MMHG | BODY MASS INDEX: 31.01 KG/M2 | HEIGHT: 61 IN | SYSTOLIC BLOOD PRESSURE: 114 MMHG

## 2025-05-17 DIAGNOSIS — R63.5 ABNORMAL WEIGHT GAIN: ICD-10-CM

## 2025-05-17 DIAGNOSIS — D25.9 LEIOMYOMA OF UTERUS, UNSPECIFIED: ICD-10-CM

## 2025-05-17 DIAGNOSIS — Z12.4 ENCOUNTER FOR SCREENING FOR MALIGNANT NEOPLASM OF CERVIX: ICD-10-CM

## 2025-05-17 DIAGNOSIS — N92.6 IRREGULAR MENSTRUATION, UNSPECIFIED: ICD-10-CM

## 2025-05-17 DIAGNOSIS — Z01.419 ENCOUNTER FOR GYNECOLOGICAL EXAMINATION (GENERAL) (ROUTINE) W/OUT ABNORMAL FINDINGS: ICD-10-CM

## 2025-05-17 PROCEDURE — 99459 PELVIC EXAMINATION: CPT

## 2025-05-17 PROCEDURE — 99395 PREV VISIT EST AGE 18-39: CPT

## 2025-05-17 RX ORDER — MULTIVITAMIN
TABLET ORAL
Refills: 0 | Status: ACTIVE | COMMUNITY

## 2025-05-23 ENCOUNTER — TRANSCRIPTION ENCOUNTER (OUTPATIENT)
Age: 40
End: 2025-05-23

## 2025-05-23 LAB
BASOPHILS # BLD AUTO: 0.07 K/UL
BASOPHILS NFR BLD AUTO: 0.7 %
CYTOLOGY CVX/VAG DOC THIN PREP: NORMAL
EOSINOPHIL # BLD AUTO: 0.3 K/UL
EOSINOPHIL NFR BLD AUTO: 3 %
ESTIMATED AVERAGE GLUCOSE: 117 MG/DL
ESTRADIOL SERPL-MCNC: 80 PG/ML
FSH SERPL-MCNC: 3.3 IU/L
HBA1C MFR BLD HPLC: 5.7 %
HCT VFR BLD CALC: 39.4 %
HGB BLD-MCNC: 12.6 G/DL
HPV HIGH+LOW RISK DNA PNL CVX: NOT DETECTED
IMM GRANULOCYTES NFR BLD AUTO: 0.4 %
LYMPHOCYTES # BLD AUTO: 2.72 K/UL
LYMPHOCYTES NFR BLD AUTO: 27 %
MAN DIFF?: NORMAL
MCHC RBC-ENTMCNC: 30.2 PG
MCHC RBC-ENTMCNC: 32 G/DL
MCV RBC AUTO: 94.5 FL
MONOCYTES # BLD AUTO: 0.74 K/UL
MONOCYTES NFR BLD AUTO: 7.3 %
NEUTROPHILS # BLD AUTO: 6.21 K/UL
NEUTROPHILS NFR BLD AUTO: 61.6 %
PLATELET # BLD AUTO: 328 K/UL
RBC # BLD: 4.17 M/UL
RBC # FLD: 13.1 %
TSH SERPL-ACNC: 0.57 UIU/ML
WBC # FLD AUTO: 10.08 K/UL

## 2025-06-19 ENCOUNTER — APPOINTMENT (OUTPATIENT)
Dept: OBGYN | Facility: CLINIC | Age: 40
End: 2025-06-19

## 2025-06-19 ENCOUNTER — APPOINTMENT (OUTPATIENT)
Dept: ANTEPARTUM | Facility: CLINIC | Age: 40
End: 2025-06-19

## 2025-09-17 ENCOUNTER — APPOINTMENT (OUTPATIENT)
Dept: INTERNAL MEDICINE | Facility: CLINIC | Age: 40
End: 2025-09-17